# Patient Record
Sex: FEMALE | Race: BLACK OR AFRICAN AMERICAN
[De-identification: names, ages, dates, MRNs, and addresses within clinical notes are randomized per-mention and may not be internally consistent; named-entity substitution may affect disease eponyms.]

---

## 2018-07-11 ENCOUNTER — HOSPITAL ENCOUNTER (INPATIENT)
Dept: HOSPITAL 92 - ERS | Age: 73
LOS: 12 days | Discharge: NURSING, REHAB OR LONG-TERM CARE | DRG: 64 | End: 2018-07-23
Attending: INTERNAL MEDICINE | Admitting: INTERNAL MEDICINE
Payer: MEDICARE

## 2018-07-11 VITALS — BODY MASS INDEX: 28.5 KG/M2

## 2018-07-11 DIAGNOSIS — E11.22: ICD-10-CM

## 2018-07-11 DIAGNOSIS — R29.710: ICD-10-CM

## 2018-07-11 DIAGNOSIS — I12.9: ICD-10-CM

## 2018-07-11 DIAGNOSIS — I16.0: ICD-10-CM

## 2018-07-11 DIAGNOSIS — D63.1: ICD-10-CM

## 2018-07-11 DIAGNOSIS — Z79.4: ICD-10-CM

## 2018-07-11 DIAGNOSIS — R47.1: ICD-10-CM

## 2018-07-11 DIAGNOSIS — Z86.718: ICD-10-CM

## 2018-07-11 DIAGNOSIS — G93.49: ICD-10-CM

## 2018-07-11 DIAGNOSIS — R47.01: ICD-10-CM

## 2018-07-11 DIAGNOSIS — I63.511: Primary | ICD-10-CM

## 2018-07-11 DIAGNOSIS — N18.4: ICD-10-CM

## 2018-07-11 DIAGNOSIS — R13.12: ICD-10-CM

## 2018-07-11 DIAGNOSIS — R62.7: ICD-10-CM

## 2018-07-11 DIAGNOSIS — E87.2: ICD-10-CM

## 2018-07-11 DIAGNOSIS — N17.9: ICD-10-CM

## 2018-07-11 DIAGNOSIS — E87.0: ICD-10-CM

## 2018-07-11 DIAGNOSIS — K21.9: ICD-10-CM

## 2018-07-11 DIAGNOSIS — G81.94: ICD-10-CM

## 2018-07-11 LAB
ALBUMIN SERPL BCG-MCNC: 3.7 G/DL (ref 3.4–4.8)
ALP SERPL-CCNC: 183 U/L (ref 40–150)
ALT SERPL W P-5'-P-CCNC: 26 U/L (ref 8–55)
ANION GAP SERPL CALC-SCNC: 14 MMOL/L (ref 10–20)
APTT PPP: 29.2 SEC (ref 22.9–36.1)
AST SERPL-CCNC: 31 U/L (ref 5–34)
BASOPHILS # BLD AUTO: 0 THOU/UL (ref 0–0.2)
BASOPHILS NFR BLD AUTO: 0.1 % (ref 0–1)
BILIRUB SERPL-MCNC: 0.6 MG/DL (ref 0.2–1.2)
BUN SERPL-MCNC: 45 MG/DL (ref 9.8–20.1)
CALCIUM SERPL-MCNC: 9.6 MG/DL (ref 7.8–10.44)
CHLORIDE SERPL-SCNC: 113 MMOL/L (ref 98–107)
CK MB SERPL-MCNC: 1.2 NG/ML (ref 0–6.6)
CK SERPL-CCNC: 68 U/L (ref 29–168)
CO2 SERPL-SCNC: 19 MMOL/L (ref 23–31)
CREAT CL PREDICTED SERPL C-G-VRATE: 0 ML/MIN (ref 70–130)
EOSINOPHIL # BLD AUTO: 0 THOU/UL (ref 0–0.7)
EOSINOPHIL NFR BLD AUTO: 0.2 % (ref 0–10)
GLOBULIN SER CALC-MCNC: 4.2 G/DL (ref 2.4–3.5)
GLUCOSE SERPL-MCNC: 174 MG/DL (ref 83–110)
HGB BLD-MCNC: 10.9 G/DL (ref 12–16)
INR PPP: 1.1
LYMPHOCYTES # BLD: 1.3 THOU/UL (ref 1.2–3.4)
LYMPHOCYTES NFR BLD AUTO: 12.7 % (ref 21–51)
MCH RBC QN AUTO: 28.7 PG (ref 27–31)
MCV RBC AUTO: 82.8 FL (ref 78–98)
MONOCYTES # BLD AUTO: 0.6 THOU/UL (ref 0.11–0.59)
MONOCYTES NFR BLD AUTO: 6 % (ref 0–10)
NEUTROPHILS # BLD AUTO: 8.3 THOU/UL (ref 1.4–6.5)
NEUTROPHILS NFR BLD AUTO: 80.9 % (ref 42–75)
PLATELET # BLD AUTO: 136 THOU/UL (ref 130–400)
POTASSIUM SERPL-SCNC: 4.7 MMOL/L (ref 3.5–5.1)
PROTHROMBIN TIME: 14.5 SEC (ref 12–14.7)
RBC # BLD AUTO: 3.81 MILL/UL (ref 4.2–5.4)
SODIUM SERPL-SCNC: 141 MMOL/L (ref 136–145)
TROPONIN I SERPL DL<=0.01 NG/ML-MCNC: 0.08 NG/ML (ref ?–0.03)
WBC # BLD AUTO: 10.3 THOU/UL (ref 4.8–10.8)

## 2018-07-11 PROCEDURE — 96365 THER/PROPH/DIAG IV INF INIT: CPT

## 2018-07-11 PROCEDURE — 86900 BLOOD TYPING SEROLOGIC ABO: CPT

## 2018-07-11 PROCEDURE — 93005 ELECTROCARDIOGRAM TRACING: CPT

## 2018-07-11 PROCEDURE — 36416 COLLJ CAPILLARY BLOOD SPEC: CPT

## 2018-07-11 PROCEDURE — 80061 LIPID PANEL: CPT

## 2018-07-11 PROCEDURE — 85610 PROTHROMBIN TIME: CPT

## 2018-07-11 PROCEDURE — 96366 THER/PROPH/DIAG IV INF ADDON: CPT

## 2018-07-11 PROCEDURE — 71045 X-RAY EXAM CHEST 1 VIEW: CPT

## 2018-07-11 PROCEDURE — A4216 STERILE WATER/SALINE, 10 ML: HCPCS

## 2018-07-11 PROCEDURE — 82553 CREATINE MB FRACTION: CPT

## 2018-07-11 PROCEDURE — 96375 TX/PRO/DX INJ NEW DRUG ADDON: CPT

## 2018-07-11 PROCEDURE — 80053 COMPREHEN METABOLIC PANEL: CPT

## 2018-07-11 PROCEDURE — 93306 TTE W/DOPPLER COMPLETE: CPT

## 2018-07-11 PROCEDURE — 93880 EXTRACRANIAL BILAT STUDY: CPT

## 2018-07-11 PROCEDURE — 82805 BLOOD GASES W/O2 SATURATION: CPT

## 2018-07-11 PROCEDURE — 74018 RADEX ABDOMEN 1 VIEW: CPT

## 2018-07-11 PROCEDURE — 70450 CT HEAD/BRAIN W/O DYE: CPT

## 2018-07-11 PROCEDURE — 84484 ASSAY OF TROPONIN QUANT: CPT

## 2018-07-11 PROCEDURE — 86901 BLOOD TYPING SEROLOGIC RH(D): CPT

## 2018-07-11 PROCEDURE — 36415 COLL VENOUS BLD VENIPUNCTURE: CPT

## 2018-07-11 PROCEDURE — 36430 TRANSFUSION BLD/BLD COMPNT: CPT

## 2018-07-11 PROCEDURE — 85025 COMPLETE CBC W/AUTO DIFF WBC: CPT

## 2018-07-11 PROCEDURE — 86850 RBC ANTIBODY SCREEN: CPT

## 2018-07-11 PROCEDURE — 85730 THROMBOPLASTIN TIME PARTIAL: CPT

## 2018-07-11 PROCEDURE — 86921 COMPATIBILITY TEST INCUBATE: CPT

## 2018-07-11 PROCEDURE — P9016 RBC LEUKOCYTES REDUCED: HCPCS

## 2018-07-11 PROCEDURE — 80048 BASIC METABOLIC PNL TOTAL CA: CPT

## 2018-07-11 RX ADMIN — INSULIN HUMAN PRN UNIT: 100 INJECTION, SOLUTION PARENTERAL at 18:36

## 2018-07-11 NOTE — ULT
BILATERAL CAROTID DUPLEX ULTRASOUND INCLUDING COLOR AND SPECTRAL DOPPLER IMAGING:

 

DATE:   07/11/18 

 

HISTORY:  

73-year-old female with history of CVA, motor deficits, left arm weakness, left hand weakness, left l
eg weakness, and aphasia. 

 

FINDINGS:

 

Visual plaque noted in the proximal ICAs and distal CCAs. 

 

PSV Right ICA:  71 cm/sec

EDV:  7 cm/sec

ICA/CCA Ratio:  0.8

 

PSV Left ICA:  86 cm/sec

EDV:  13 cm/sec

ICA/CCA Ratio:  1.0

 

Minimal increased velocities in both right and left ECAs, worse on the right side. 

 

IMPRESSION: 

No hemodynamically significant stenosis. Bilateral plaque, evidence for atherosclerotic arteriovascul
ar carotid disease bilaterally. 

 

 

POS: KEL

## 2018-07-11 NOTE — HP
CHIEF COMPLAINT:  Altered mental status.

 

HISTORY OF PRESENT ILLNESS:  This patient is a 73-year-old female with history of hypertension, diabe
mel who presented to this facility via [facility] transfer.  The patient was apparently in her usual
 state of health yesterday; however, she went to bed early in the evening, according to her family ar
ound 6:00 p.m.  Today, the patient was found to be altered and the patient's son had asked another so
n to come and evaluate her.  When he was unable to get her on the phone, he called an ambulance and t
he patient was subsequently transported.  Here, the patient was noted to have slurred speech and prof
ound left-sided weakness consistent with CVA.  CT scan of the brain confirms a large right middle cer
ebral artery distribution infarct which is already relatively mature.  ER physician noted the patient
 will be on her window for TPA.  He discussed the case with Dr. Cedillo to discuss possible retrieval i
ntervention; however, given the majority of the infarct on the scan, she is not a candidate for that 
either.  The patient has had significant elevations in her blood pressure in the emergency department
 which was initially unresponsive to labetalol and she has subsequently been placed on a Cardene drip
.

 

PAST MEDICAL HISTORY:  Primarily notable from the record includes history of reflux and "blood clot i
n the leg and neck," history of diabetes, hypertension, and apparent history of CVA as well.

 

SOCIAL HISTORY:  No alcohol, drugs or tobacco.

 

FAMILY HISTORY:  Not known at this time.

 

REVIEW OF SYSTEMS:  Not obtainable.

 

CURRENT MEDICATIONS:  Ferrous sulfate 65 mg 1 p.o. b.i.d. and Coreg 25 mg 1 p.o. b.i.d.

 

ALLERGIES:  None known.

 

PHYSICAL EXAMINATION:

VITAL SIGNS:  Most recent recorded vitals, blood pressure 192/88, pulse was 80, respirations 16, O2 s
at 94% on room air.

GENERAL APPEARANCE:  Age appropriate female.  She is lying supine in the bed.  She is intermittently 
awake.  She does acknowledge her presence and occasionally speaks one syllable words.

HEENT:  Pupils are equal and reactive.  She has no OP lesions.

NECK:  Supple and symmetric.

HEART:  Regular rate and rhythm without murmurs, gallops or rubs.

LUNGS:  Clear to auscultation bilaterally.  No wheezes or rales.

ABDOMEN:  Soft, nontender, nondistended, positive bowel sounds.  No masses or organomegaly.

EXTREMITIES:  Warm and dry without edema.

SKIN:  Intact without lesions.

NEUROLOGIC:  Patient is borderline obtunded, but we will interact.  She has profound left hemiplegia 
at present.  She has some dysarthria, but is able to communicate somewhat.  She appears to have signi
ficant left-sided neglect and left-sided facial hypesthesia.

 

LABORATORY DATA AND IMAGING DATA:  White count 7.3, hemoglobin 10.9, platelets 136.  INR is 1.1, PTT 
29.2.  Sodium is 141, potassium 4.7, chloride 113, CO2 19, BUN 45, creatinine 3.59, glucose 174, calc
ium 9.6, AST 31, ALT 26, alkaline phosphatase 183.  CK 68, troponin 0.077, albumin 3.7.  CT of the he
ad as mentioned above with a large right middle cerebral artery infarct.  Chest x-ray is negative.

 

IMPRESSION AND PLAN:

1.  Large right hemispheric infarct in the right MCA distribution.  This infarct appears to be subacu
te at this point fairly mature based on the evidence revealed on the CT scan.  The patient has manife
stations of profound left-sided hemiplegia and left-sided neglect with apparently some left-sided fac
ial hypesthesia as well.  The patient has received an aspirin.  She will be admitted to the ICU.  We 
will consult the stroke team including Neurology, speech therapy, PT and OT.  We will obtain a caroti
d Doppler and an echocardiogram.  I discussed at length with the patient's family.  The patient is ce
rtainly at risk of some complications over the next 24-48 hours including extension of the infarct, w
orsening cerebral edema, seizures and others.

2.  Chronic kidney disease stage 4.  The patient is followed by Dr. Leahy.  She has not received a CTA 
partly because the infarct was beyond the point of being benefited from retrieval and because of the 
risks to the kidneys.  We will consult Dr. Leahy to continue to follow the patient while she is in the 
hospital.

3.  Hypertension.  The patient has post-infarct hypertension.  We will allow some permissive hyperten
jenna.  She is currently on Cardene with acceptable blood pressure control.

4.  Diabetes mellitus.  We will continue with Accu-Cheks and sliding scale as needed.

 

The patient has not determined any end of life issues or done any advanced planning.  She has 2 sons.
  They can speak on her behalf, one of whom is present.  I will consult Palliative Care team as well.

## 2018-07-11 NOTE — CT
BRAIN CT WITHOUT IV CONTRAST:

 

HISTORY:

A 73-year-old female with a history of left-sided weakness and a left-sided facial droop.  Last seen 
normal approximately 8 hours ago.

 

FINDINGS:

There is a large, well defined area of abnormal low attenuation in the right temporal lobe and extend
ing into the posterior frontal lobe and the anterior parietal lobe, evidence for a large, subacute ri
ght middle cerebral artery distribution infarct.  No evidence of acute hemorrhage.  No evidence of a 
midline shift.  Mild right-sided maxillary sinus mucosal disease.  The mastoids are clear.

 

IMPRESSION:

Large right middle cerebral artery distribution subacute infarct without evidence for acute hemorrhag
e.

 

Findings discussed with Dr. Rushing in the ER at 8:00 a.m.

 

CODE CR

 

POS: KEL

## 2018-07-11 NOTE — CON
DATE OF CONSULTATION:  07/11/2018

 

This consult encompassed 70 minutes time.  Of that time, greater than 50% was spent with the patient 
and/or in the patient's critical care unit.

 

HISTORY OF PRESENT ILLNESS:  The patient is a 73-year-old female who presented with altered mental st
atus and left-sided hemiplegia.  She apparently presented 12 hours after onset and is considered outs
gonzalez the window for TPA.  She is severely hypertensive and is currently on a Cardene drip.

 

The patient cannot give much in the way of history, but she can talk.

 

PAST MEDICAL HISTORY:

1.  Gastroesophageal reflux.

2.  Blood clot in the leg and neck.

3.  History of diabetes mellitus.

4.  Hypertension.

5.  Previous stroke.

 

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

 

SOCIAL HISTORY:  Does not consume alcohol, does not use illicit drugs.

 

FAMILY MEDICAL HISTORY:  Unknown.

 

MEDICATIONS PRIOR TO ADMISSION:  Iron sulfate, Coreg.

 

PAST SURGICAL HISTORY:  Unknown.

 

ALLERGIES:  None.

 

REVIEW OF SYSTEMS:  Cannot be obtained secondary to patient's altered mental status.

 

PHYSICAL EXAMINATION:

VITAL SIGNS:  Temperature 98, O2 sat 95%, blood pressure 185/75, pulse 89, respiration rate 17.

GENERAL:  The patient is awake.  Neurologically, she has a left-sided facial droop.  She has left arm
 and left leg hemiparesis.  She has no sensation on the left side and no movement in the left side.  
Right side has full movement and full sensation.

HEENT:  Otherwise, unremarkable.

NECK:  No JVD.

LUNGS:  Clear without wheezing or rhonchi.

CARDIAC:  S1, S2 regular without murmur.

ABDOMEN:  Soft, nontender, nondistended.

EXTREMITIES:  No clubbing, cyanosis, or edema.

 

IMAGING:  Chest x-ray reviewed personally by myself shows normal heart size, perhaps some mild fullne
ss in the right hilum, otherwise negative.  Brain CT demonstrated a large right MCA distribution infa
rct without evidence of acute hemorrhage.

 

LABORATORY DATA:  White blood cell count 10, hematocrit 31.6, platelet count 136.  INR 1.1.  Sodium 1
41, potassium 4.7, chloride 113, CO2 19, BUN 45, creatinine 3.6, glucose 174, alkaline phosphatase 18
3.  Troponin 0.07.

 

ASSESSMENT:

1.  Cerebrovascular accident - right MCA distribution with resultant left-sided hemiparesis and left-
sided facial paralysis.

2.  Chronic kidney disease.

3.  Hypertension.

4.  Diabetes mellitus.

5.  Hypertension, out of control.

 

PLAN:  The patient is currently on a Cardene drip for blood pressure control.  Tomorrow she could pro
bably be converted over to oral medications.  Neurology has been consulted.  She is on sliding scale 
insulin for her diabetes mellitus.

## 2018-07-11 NOTE — RAD
CHEST 1 VIEW:

 

Date:  07/11/18 

 

HISTORY:  

73-year-old female with history of altered mental status, aphasia, left-sided deficits, stroke alert.
 

 

FINDINGS:

Minimal cardiomegaly with some mild bilateral vascular congestion. No confluent pneumonia, overt carlos
a, or pleural effusion. 

 

IMPRESSION: 

Cardiomegaly with mild vascular congestion. 

 

 

POS: MELISSAH

## 2018-07-12 LAB
ANION GAP SERPL CALC-SCNC: 18 MMOL/L (ref 10–20)
BASOPHILS # BLD AUTO: 0.1 THOU/UL (ref 0–0.2)
BASOPHILS NFR BLD AUTO: 0.4 % (ref 0–1)
BUN SERPL-MCNC: 54 MG/DL (ref 9.8–20.1)
CALCIUM SERPL-MCNC: 9.4 MG/DL (ref 7.8–10.44)
CHD RISK SERPL-RTO: 5.1 (ref ?–4.5)
CHLORIDE SERPL-SCNC: 115 MMOL/L (ref 98–107)
CHOLEST SERPL-MCNC: 128 MG/DL
CO2 SERPL-SCNC: 14 MMOL/L (ref 23–31)
CREAT CL PREDICTED SERPL C-G-VRATE: 17 ML/MIN (ref 70–130)
EOSINOPHIL # BLD AUTO: 0 THOU/UL (ref 0–0.7)
EOSINOPHIL NFR BLD AUTO: 0 % (ref 0–10)
GLUCOSE SERPL-MCNC: 177 MG/DL (ref 83–110)
HDLC SERPL-MCNC: 25 MG/DL
HGB BLD-MCNC: 10.5 G/DL (ref 12–16)
LDLC SERPL CALC-MCNC: 71 MG/DL
LYMPHOCYTES # BLD: 1.2 THOU/UL (ref 1.2–3.4)
LYMPHOCYTES NFR BLD AUTO: 9.2 % (ref 21–51)
MCH RBC QN AUTO: 28.5 PG (ref 27–31)
MCV RBC AUTO: 81.8 FL (ref 78–98)
MONOCYTES # BLD AUTO: 0.6 THOU/UL (ref 0.11–0.59)
MONOCYTES NFR BLD AUTO: 4.3 % (ref 0–10)
NEUTROPHILS # BLD AUTO: 11.4 THOU/UL (ref 1.4–6.5)
NEUTROPHILS NFR BLD AUTO: 86.2 % (ref 42–75)
PLATELET # BLD AUTO: 154 THOU/UL (ref 130–400)
POTASSIUM SERPL-SCNC: 5 MMOL/L (ref 3.5–5.1)
RBC # BLD AUTO: 3.7 MILL/UL (ref 4.2–5.4)
SODIUM SERPL-SCNC: 142 MMOL/L (ref 136–145)
TRIGL SERPL-MCNC: 160 MG/DL (ref ?–150)
WBC # BLD AUTO: 13.2 THOU/UL (ref 4.8–10.8)

## 2018-07-12 RX ADMIN — INSULIN HUMAN PRN UNIT: 100 INJECTION, SOLUTION PARENTERAL at 06:49

## 2018-07-12 RX ADMIN — NICARDIPINE HYDROCHLORIDE SCH MLS: 25 INJECTION INTRAVENOUS at 13:15

## 2018-07-12 RX ADMIN — NICARDIPINE HYDROCHLORIDE SCH MLS: 25 INJECTION INTRAVENOUS at 20:01

## 2018-07-12 RX ADMIN — Medication SCH ML: at 20:04

## 2018-07-12 RX ADMIN — INSULIN HUMAN PRN UNIT: 100 INJECTION, SOLUTION PARENTERAL at 17:59

## 2018-07-12 RX ADMIN — NICARDIPINE HYDROCHLORIDE SCH MLS: 25 INJECTION INTRAVENOUS at 09:23

## 2018-07-12 RX ADMIN — HEPARIN SODIUM SCH UNITS: 5000 INJECTION, SOLUTION INTRAVENOUS; SUBCUTANEOUS at 20:04

## 2018-07-12 RX ADMIN — INSULIN HUMAN PRN UNIT: 100 INJECTION, SOLUTION PARENTERAL at 11:50

## 2018-07-12 RX ADMIN — NICARDIPINE HYDROCHLORIDE SCH MLS: 25 INJECTION INTRAVENOUS at 03:00

## 2018-07-12 RX ADMIN — Medication SCH: at 12:21

## 2018-07-12 NOTE — PRG
DATE OF SERVICE:  07/12/2018

 

The patient is doing about the same.  She is dysarthric.  She has hemiparesis on the left side, but s
he will follow commands with the right.

 

PHYSICAL EXAMINATION:

VITAL SIGNS:  Temperature is 99.2, pulse 97, blood pressure 169/60, O2 sat 93%.

HEENT:  Unremarkable except for facial droop.

NECK:  No JVD.

LUNGS:  Coarse breath sounds.

CARDIAC:  S1 and S2 regular.

ABDOMEN:  Soft, nontender.

EXTREMITIES:  No edema.

 

LABORATORY DATA:  White blood cell count 13.2, hematocrit 30, platelet count 154.  INR 1.1.  Sodium 1
42, potassium 5, chloride 115, CO2 14, BUN 54, creatinine 3.7, glucose 177.

 

ASSESSMENT:

1.  Status post cerebrovascular accident with left-sided hemiparesis.  She has a right middle cerebra
l artery distribution infarct.

2.  Chronic kidney disease.

3.  Non-anion gap metabolic acidosis.

4.  Hypertension.

5.  Poor swallowing. 

6.  Diabetes mellitus.

 

PLAN:  She needs to be seen by Speech Therapy to evaluate her swallowing.  If she cannot swallow, the
n we need to put in some type of feeding tube, whether it be NG tube or PEG tube so that we can give 
her blood pressure medication.  In the meantime, she is continuing with Cardene drip which necessitat
es keeping her in the CCU.

## 2018-07-12 NOTE — PDOC.PN
- Subjective


Encounter Start Date: 07/12/18


Encounter Start Time: 08:50





Denies any pain or any specific needs.  She expresses understanding of her 

medical situation.





- Objective


Resuscitation Status: 


 











Resuscitation Status           FULL:Full Resuscitation














Vital Signs & Weight: 


 Vital Signs (12 hours)











  Temp Pulse Pulse Pulse Resp BP BP


 


 07/12/18 12:00  99.7 F H      


 


 07/12/18 09:05    95  96   171/65 H  141/115 H


 


 07/12/18 08:00  99.2 F  95    23 H  


 


 07/12/18 07:00  99.2 F      


 


 07/12/18 04:00  98.4 F      














  Pulse Ox Pulse Ox Pulse Ox


 


 07/12/18 12:00   


 


 07/12/18 09:05   95  94 L


 


 07/12/18 08:00  95  


 


 07/12/18 07:00   


 


 07/12/18 04:00   








 Weight











Admit Weight                   170 lb 6.677 oz


 


Weight                         170 lb 6.677 oz











 Most Recent Monitor Data











Heart Rate from ECG            98


 


NIBP                           202/66


 


NIBP BP-Mean                   85


 


Respiration from ECG           15


 


SpO2                           92














I&O: 


 











 07/11/18 07/12/18 07/13/18





 06:59 06:59 06:59


 


Intake Total  2337 30


 


Output Total   600


 


Balance  2337 -570











Result Diagrams: 


 07/12/18 05:30





 07/12/18 05:30


Additional Labs: 


 Accuchecks











  07/12/18 07/12/18 07/11/18





  11:26 05:35 23:25


 


POC Glucose  214 H  175 H  136 H














  07/11/18





  17:35


 


POC Glucose  209 H














Phys Exam





- Physical Examination


Constitutional: NAD


Still sleeps much of the time. 


Neck: no JVD, supple


Respiratory: no wheezing, no rales, no rhonchi, clear to auscultation bilateral


Cardiovascular: RRR, no significant murmur, no rub


Gastrointestinal: soft, non-tender, no distention, positive bowel sounds


Musculoskeletal: no edema


Dense left hemiplegia.  Speech is improved.





Dx/Plan


(1) Acute CVA (cerebrovascular accident)


Code(s): I63.9 - CEREBRAL INFARCTION, UNSPECIFIED   Status: Acute   Comment: 

Dense left hemiplegia.  BP has continued to be a challenge.  Swallowing changes 

intermittently. Had ASA yesterday.  Will change to oral today.  Continue with 

the stroke team and assessing swallow.   





(2) Hypertensive urgency


Code(s): I16.0 - HYPERTENSIVE URGENCY   Status: Acute   Comment: On Cardene 

gtt.  Continue to titrate as needed.   





(3) Diabetes


Code(s): E11.9 - TYPE 2 DIABETES MELLITUS WITHOUT COMPLICATIONS   Status: 

Chronic   Comment: Will continue with SSI.  No able to eat much at this time.  

   





(4) Hypertension


Code(s): I10 - ESSENTIAL (PRIMARY) HYPERTENSION   Status: Chronic   Comment: 

Resume home meds.   





(5) Hyperlipidemia


Code(s): E78.5 - HYPERLIPIDEMIA, UNSPECIFIED   Status: Chronic   Comment: 

Resume home statin   





(6) Hemiplegia affecting left nondominant side


Code(s): G81.94 - HEMIPLEGIA, UNSPECIFIED AFFECTING LEFT NONDOMINANT SIDE   

Status: Acute   Comment: PT/OT   





(7) Dysphagia


Code(s): R13.10 - DYSPHAGIA, UNSPECIFIED   Status: Acute   Comment: ST 

continually assessing.  Believes she can take pills now, but still not ready 

for food.   





(8) CKD (chronic kidney disease) stage 4, GFR 15-29 ml/min


Code(s): N18.4 - CHRONIC KIDNEY DISEASE, STAGE 4 (SEVERE)   Status: Acute   

Comment: Renal dose meds.   





- Plan





* .

## 2018-07-12 NOTE — CON
DATE OF CONSULTATION:  07/11/2018

 

REFERRING PHYSICIAN:  Dr. Modesto Ruiz.

 

REASON FOR CONSULTATION:  Left hemiparesis.

 

HISTORY OF PRESENT ILLNESS:  Ms. Purdy is a pleasant 73-year-old -American female, who has 
been considered for evaluation of left hemiparesis.  History is obtained from the patient's dictated 
H&P note as patient unable to provide and there were no family member present at bedside.  Apparently
, patient was in her usual state of health yesterday; however, she went to bed early in the evening a
round 6:00 p.m. when one of the son had called her and she did not answer the phone, which concerned 
him and thus, he had called an ambulance.  Patient was subsequently transferred to [facility].  She was found to have slurred speech and left hemiplegia.  CT scan was obtained in the ER, which 
had shown large right MCA distribution.  Hypodensity, for this reason, she was not a candidate for an
y endovascular therapy.  She is now being admitted to ICU for close observation.  Patient currently d
enies any headache, chest pain, palpitation.

 

PAST MEDICAL HISTORY:  Significant for diabetes, hypertension, history of stroke, and 

history of blood clot.

 

FAMILY HISTORY:  Not contributory.

 

SOCIAL HISTORY:  She does not smoke cigarettes, drink alcohol or use illicit drugs.

 

CURRENT MEDICATIONS:  Unknown.

 

ALLERGIES:  Include LISINOPRIL.

 

REVIEW OF SYSTEMS:  As mentioned, which was negative.

 

PHYSICAL EXAMINATION:

VITAL SIGNS:  Blood pressure of 181/94, pulse of 95, respirations of 18, O2 sats of 93%, and temperat
ure of 98.7.

GENERAL:  Well-developed, well-nourished -American female, in no apparent distress.

RESPIRATORY:  Clear to auscultation bilaterally.

CARDIOVASCULAR:  Regular rate and rhythm.

NEUROLOGIC:  Mental status:  The patient is awake, alert, oriented x3.  Speech and language mildly dy
sarthric speech.  Cranial nerves:  Pupils are 3 mm and reactive.  Visual fields are intact.  External
 muscles are intact.  She does have a right gaze deviation.  There is a left facial droop noted.  Mot
or exam showed normal tone and bulk on the right upper and right lower extremity.  She has a flaccid 
left upper and left lower extremity.  Her strength in the left upper and left lower extremity is 0/5.
  Strength in the right upper and right lower extremity is 5/5.  Sensory:  She has a hemineglect on t
he left side.  She has loss of sensation on the left side.  Deep tendon reflexes, brisk reflexes in t
he left upper and left lower extremity.  Babinski plantar responses extensor on the left.  Gait and R
omberg coordination could not be tested.

 

LABORATORY DATA:  I reviewed, which included CBC, coag panel, CMP, which is significant for hemoglobi
n 10.9, hematocrit 31.6, BUN of 45, creatinine of 3.59, glucose of 174, alkaline phosphatase of 183.

 

IMAGING STUDIES:  CT head without contrast was reviewed, which showed large hypodensity involving the
 right MCA distribution suggestive of acute ischemic event.

 

IMPRESSION:

1.  Large right middle cerebral artery distribution ischemic infarct.

2.  Left hemiparesis noted due to #1.

3.  Dysarthric noted due to #1.

 

ASSESSMENT AND PLAN:  Ms. Purdy is a pleasant 73-year-old -American female, who presented w
ith an acute onset of left hemiparesis and dysarthria.  She had a CT scan of the head done, which sg
wed large right MCA distribution ischemic infarct.  At this time, I would recommend making patient n.
p.o. until further evaluated by Speech Therapy.  I will recommend consulting PT, OT, speech therapy. 
 I will recommend starting her on aspirin 300 mg suppository until she is able to take orally, at [facility] time it can be switched to aspirin 325 mg daily for secondary stroke prevention.  I will recommend
 obtaining echocardiogram and placing her on telemetry monitoring to observe any paroxysmal atrial fi
brillation.  She does have a very large area of the stroke, which concerns me that she may develop sw
elling.  If there is a deterioration in neurological function, then we need to obtain stat CT head an
d consult Neurosurgery for their assistance.  I discussed the prognosis and the CT scan results findi
[facility] with the son Mirza Benjamin over the phone and explained that she does have a large area of the 
stroke and currently prognosis is guarded.  I have answered all his questions and concerns during the
 conversation.  Continue supportive care.  Continue current medical management.

## 2018-07-13 LAB
ANION GAP SERPL CALC-SCNC: 16 MMOL/L (ref 10–20)
BASOPHILS # BLD AUTO: 0 THOU/UL (ref 0–0.2)
BASOPHILS NFR BLD AUTO: 0.2 % (ref 0–1)
BUN SERPL-MCNC: 65 MG/DL (ref 9.8–20.1)
CALCIUM SERPL-MCNC: 9.2 MG/DL (ref 7.8–10.44)
CHLORIDE SERPL-SCNC: 118 MMOL/L (ref 98–107)
CO2 SERPL-SCNC: 15 MMOL/L (ref 23–31)
CREAT CL PREDICTED SERPL C-G-VRATE: 17 ML/MIN (ref 70–130)
EOSINOPHIL # BLD AUTO: 0 THOU/UL (ref 0–0.7)
EOSINOPHIL NFR BLD AUTO: 0 % (ref 0–10)
GLUCOSE SERPL-MCNC: 248 MG/DL (ref 83–110)
HGB BLD-MCNC: 9.7 G/DL (ref 12–16)
LYMPHOCYTES # BLD: 1.5 THOU/UL (ref 1.2–3.4)
LYMPHOCYTES NFR BLD AUTO: 12.1 % (ref 21–51)
MCH RBC QN AUTO: 28.9 PG (ref 27–31)
MCV RBC AUTO: 82 FL (ref 78–98)
MONOCYTES # BLD AUTO: 0.9 THOU/UL (ref 0.11–0.59)
MONOCYTES NFR BLD AUTO: 6.7 % (ref 0–10)
NEUTROPHILS # BLD AUTO: 10.2 THOU/UL (ref 1.4–6.5)
NEUTROPHILS NFR BLD AUTO: 80.9 % (ref 42–75)
PLATELET # BLD AUTO: 151 THOU/UL (ref 130–400)
POTASSIUM SERPL-SCNC: 4.5 MMOL/L (ref 3.5–5.1)
RBC # BLD AUTO: 3.36 MILL/UL (ref 4.2–5.4)
SODIUM SERPL-SCNC: 144 MMOL/L (ref 136–145)
WBC # BLD AUTO: 12.7 THOU/UL (ref 4.8–10.8)

## 2018-07-13 RX ADMIN — Medication SCH ML: at 09:46

## 2018-07-13 RX ADMIN — NICARDIPINE HYDROCHLORIDE SCH MLS: 25 INJECTION INTRAVENOUS at 20:02

## 2018-07-13 RX ADMIN — NICARDIPINE HYDROCHLORIDE SCH MLS: 25 INJECTION INTRAVENOUS at 23:43

## 2018-07-13 RX ADMIN — INSULIN HUMAN PRN UNIT: 100 INJECTION, SOLUTION PARENTERAL at 06:00

## 2018-07-13 RX ADMIN — INSULIN HUMAN PRN UNIT: 100 INJECTION, SOLUTION PARENTERAL at 00:33

## 2018-07-13 RX ADMIN — POLYVINYL ALCOHOL, POVIDONE PRN DROP: 14; 6 SOLUTION/ DROPS OPHTHALMIC at 18:16

## 2018-07-13 RX ADMIN — HEPARIN SODIUM SCH UNITS: 5000 INJECTION, SOLUTION INTRAVENOUS; SUBCUTANEOUS at 20:04

## 2018-07-13 RX ADMIN — HEPARIN SODIUM SCH UNITS: 5000 INJECTION, SOLUTION INTRAVENOUS; SUBCUTANEOUS at 09:50

## 2018-07-13 RX ADMIN — INSULIN HUMAN PRN UNIT: 100 INJECTION, SOLUTION PARENTERAL at 18:43

## 2018-07-13 RX ADMIN — NICARDIPINE HYDROCHLORIDE SCH MLS: 25 INJECTION INTRAVENOUS at 12:05

## 2018-07-13 RX ADMIN — INSULIN HUMAN PRN UNIT: 100 INJECTION, SOLUTION PARENTERAL at 12:11

## 2018-07-13 RX ADMIN — NICARDIPINE HYDROCHLORIDE SCH MLS: 25 INJECTION INTRAVENOUS at 15:39

## 2018-07-13 RX ADMIN — Medication SCH ML: at 20:02

## 2018-07-13 RX ADMIN — NICARDIPINE HYDROCHLORIDE SCH MLS: 25 INJECTION INTRAVENOUS at 04:14

## 2018-07-13 RX ADMIN — NICARDIPINE HYDROCHLORIDE SCH MLS: 25 INJECTION INTRAVENOUS at 00:27

## 2018-07-13 NOTE — PDOC.PN
- Subjective


Encounter Start Date: 07/13/18


Encounter Start Time: 12:30





Patient able to speak, but limited.  Nursing notes she has been a little 

somnolent, but awakens easily and will speak if asked.





- Objective


Resuscitation Status: 


 











Resuscitation Status           FULL:Full Resuscitation














Vital Signs & Weight: 


 Vital Signs (12 hours)











  Temp Pulse Pulse BP BP Pulse Ox Pulse Ox


 


 07/13/18 12:00  98.5 F      


 


 07/13/18 10:44   98  98  173/61 H  181/56 H  95  96


 


 07/13/18 08:00  98.1 F      








 Weight











Admit Weight                   170 lb 6.677 oz


 


Weight                         170 lb 6.677 oz











 Most Recent Monitor Data











Heart Rate from ECG            96


 


NIBP                           168/54


 


NIBP BP-Mean                   102


 


Respiration from ECG           16


 


SpO2                           96














I&O: 


 











 07/12/18 07/13/18 07/14/18





 06:59 06:59 06:59


 


Intake Total 2337 1504 


 


Output Total  1100 


 


Balance 2337 404 











Result Diagrams: 


 07/13/18 05:30





 07/13/18 05:30


Additional Labs: 


 Accuchecks











  07/13/18 07/13/18 07/12/18





  12:06 00:34 17:57


 


POC Glucose  213 H  247 H  222 H














Phys Exam





- Physical Examination


Lying supine, eyes open.  


HEENT: oral pharynx no lesions


Neck: no JVD, supple


Respiratory: no wheezing


Upper airway rales from secretions. 


Cardiovascular: RRR, no significant murmur


Gastrointestinal: soft, no distention, positive bowel sounds


Some puffy edema of the hands, left > right.


Dense left hemiplegia, subdued mental status, Left neglect.





Dx/Plan


(1) Acute CVA (cerebrovascular accident)


Code(s): I63.9 - CEREBRAL INFARCTION, UNSPECIFIED   Status: Acute   Comment: 

Dense left hemiplegia.  BP has continued to be a challenge but better.  

Swallowing changes intermittently. Had ASA yesterday.  Will change to rectal 

today.  Continue with the stroke team and assessing swallow.   





(2) Hypertensive urgency


Code(s): I16.0 - HYPERTENSIVE URGENCY   Status: Acute   Comment: On Cardene 

gtt.  Continue to titrate as needed.  PRN's in addition to Cadene.  Expect it 

will improve within a day or two.    





(3) Diabetes


Code(s): E11.9 - TYPE 2 DIABETES MELLITUS WITHOUT COMPLICATIONS   Status: 

Chronic   Comment: Will continue with SSI.  No able to eat much at this time.  

   





(4) Hypertension


Code(s): I10 - ESSENTIAL (PRIMARY) HYPERTENSION   Status: Chronic   Comment: 

Resume home meds. When and if swallowing allows.   





(5) Hyperlipidemia


Code(s): E78.5 - HYPERLIPIDEMIA, UNSPECIFIED   Status: Chronic   Comment: 

Resume home statin when possible.   





(6) Hemiplegia affecting left nondominant side


Code(s): G81.94 - HEMIPLEGIA, UNSPECIFIED AFFECTING LEFT NONDOMINANT SIDE   

Status: Acute   Comment: PT/OT   





(7) Dysphagia


Code(s): R13.10 - DYSPHAGIA, UNSPECIFIED   Status: Acute   Comment: ST 

continually assessing.  Believes she can take pills now, but still not ready 

for food.   





(8) CKD (chronic kidney disease) stage 4, GFR 15-29 ml/min


Code(s): N18.4 - CHRONIC KIDNEY DISEASE, STAGE 4 (SEVERE)   Status: Acute   

Comment: Renal dose meds.  Made Dr. Leahy aware of her situation.    





- Plan





* Very long discussion with the patient's children and sister.  Explained 

everything that has happened and what we are doing for her.  Answered their 

questions.  They believe she wants everything possible done and she wants to 

fight to get better.  Agreed that if she is not able to take po's in next 24 

hours, we will consider DHT for feeding.  They understand that there is risk of 

aspiration even with that.  I also explained that there are a number of 

potential complications that still exist and that her recovery will be difficult

, long and not guaranteed.


* Time spent with family counseling 30 min.

## 2018-07-13 NOTE — PRG
DATE OF SERVICE:  07/13/2018

 

SUBJECTIVE:  The patient remains in the CCU on a Cardene drip.  She will say one word phrases when st
imulated, but will not follow any commands for me specifically.

 

PHYSICAL EXAMINATION:

VITAL SIGNS:  Temperature 98.8, pulse 101, blood pressure 150/76.  Currently on a Cardene drip at 15.


HEENT:  Unremarkable.

NECK:  No JVD.

LUNGS:  Clear.

CARDIAC:  S1 and S2 regular.

ABDOMEN:  Soft.

EXTREMITIES:  No edema.

 

LABORATORY DATA:  Sodium 144, potassium 4.5, chloride 118, CO2 15, BUN 65, creatinine 3.6.  White blo
od cell count 12.7, hematocrit 27.5, platelet count 151.

 

ASSESSMENT:

1.  Cerebrovascular accident with continued decline in mental status.

2.  Chronic renal failure.

3.  Large middle cerebral artery distribution infarct.

 

PLAN:  Her prognosis is extremely poor.  I doubt that she can swallow any antihypertensive medication
.  Palliative care is meeting with the family today.  I would encourage DNR and conversion to comfort
 measures.

## 2018-07-14 LAB
ANALYZER IN CARDIO: (no result)
ANION GAP SERPL CALC-SCNC: 15 MMOL/L (ref 10–20)
BASE EXCESS STD BLDA CALC-SCNC: -10.5 MEQ/L
BUN SERPL-MCNC: 72 MG/DL (ref 9.8–20.1)
CA-I BLDA-SCNC: 1.1 MMOL/L (ref 1.12–1.3)
CALCIUM SERPL-MCNC: 9 MG/DL (ref 7.8–10.44)
CHLORIDE SERPL-SCNC: 122 MMOL/L (ref 98–107)
CO2 SERPL-SCNC: 14 MMOL/L (ref 23–31)
CREAT CL PREDICTED SERPL C-G-VRATE: 18 ML/MIN (ref 70–130)
GLUCOSE SERPL-MCNC: 188 MG/DL (ref 83–110)
HCO3 BLDA-SCNC: 13.3 MEQ/L (ref 22–28)
HCT VFR BLDA CALC: 21 % (ref 36–47)
HGB BLDA-MCNC: 7.1 G/DL (ref 12–16)
PCO2 BLDA: 22.2 MMHG (ref 35–45)
PH BLDA: 7.39 [PH] (ref 7.35–7.45)
PO2 BLDA: 90.1 MMHG (ref 70–?)
POTASSIUM SERPL-SCNC: 4.4 MMOL/L (ref 3.5–5.1)
SODIUM SERPL-SCNC: 147 MMOL/L (ref 136–145)
SPECIMEN DRAWN FROM PATIENT: (no result)

## 2018-07-14 RX ADMIN — Medication SCH ML: at 10:39

## 2018-07-14 RX ADMIN — INSULIN HUMAN PRN UNIT: 100 INJECTION, SOLUTION PARENTERAL at 18:43

## 2018-07-14 RX ADMIN — INSULIN HUMAN PRN UNIT: 100 INJECTION, SOLUTION PARENTERAL at 05:41

## 2018-07-14 RX ADMIN — INSULIN HUMAN PRN UNIT: 100 INJECTION, SOLUTION PARENTERAL at 13:58

## 2018-07-14 RX ADMIN — NICARDIPINE HYDROCHLORIDE SCH MLS: 25 INJECTION INTRAVENOUS at 03:03

## 2018-07-14 RX ADMIN — NICARDIPINE HYDROCHLORIDE SCH MLS: 25 INJECTION INTRAVENOUS at 05:41

## 2018-07-14 RX ADMIN — Medication SCH ML: at 20:47

## 2018-07-14 RX ADMIN — NICARDIPINE HYDROCHLORIDE SCH MLS: 25 INJECTION INTRAVENOUS at 10:37

## 2018-07-14 RX ADMIN — HEPARIN SODIUM SCH UNITS: 5000 INJECTION, SOLUTION INTRAVENOUS; SUBCUTANEOUS at 16:40

## 2018-07-14 RX ADMIN — HEPARIN SODIUM SCH UNITS: 5000 INJECTION, SOLUTION INTRAVENOUS; SUBCUTANEOUS at 20:45

## 2018-07-14 NOTE — PDOC.PN
- Subjective


Encounter Start Date: 07/14/18


Encounter Start Time: 12:15





Unchanged.  No new issues per nursing.  





- Objective


Resuscitation Status: 


 











Resuscitation Status           FULL:Full Resuscitation














Vital Signs & Weight: 


 Vital Signs (12 hours)











  Temp Pulse Pulse Pulse Resp BP BP


 


 07/14/18 12:00  98.9 F      


 


 07/14/18 08:47    94  135 H   158/60 H  182/63 H


 


 07/14/18 08:00  98.9 F      


 


 07/14/18 07:59  98.2 F  100    20  


 


 07/14/18 03:00  98.2 F      














  Pulse Ox


 


 07/14/18 12:00  100


 


 07/14/18 08:47 


 


 07/14/18 08:00 


 


 07/14/18 07:59  100


 


 07/14/18 03:00 








 Weight











Admit Weight                   170 lb 6.677 oz


 


Weight                         170 lb 6.677 oz











 Most Recent Monitor Data











Heart Rate from ECG            99


 


NIBP                           173/56


 


NIBP BP-Mean                   102


 


Respiration from ECG           17


 


SpO2                           100














I&O: 


 











 07/13/18 07/14/18 07/15/18





 06:59 06:59 06:59


 


Intake Total 1504 2908 


 


Output Total 1100  


 


Balance 404 2908 











Result Diagrams: 


 07/13/18 05:30





 07/14/18 12:54


Additional Labs: 


 Accuchecks











  07/14/18 07/14/18 07/13/18





  13:53 05:41 23:44


 


POC Glucose  184 H  199 H  177 H














  07/13/18 07/13/18





  18:43 12:06


 


POC Glucose  200 H  213 H














Phys Exam





- Physical Examination


Constitutional: NAD


Slightly tachypneic.  Occasional cough with upper airway noise - secetions


Respiratory: no wheezing


Upper airway noise


Cardiovascular: RRR, no significant murmur


Gastrointestinal: soft, non-tender, no distention


Generalized puffy edema


Dense left hemiplegia.  Still has movement on the right.  May be slightly


weaker on the right.





Dx/Plan


(1) Acute CVA (cerebrovascular accident)


Code(s): I63.9 - CEREBRAL INFARCTION, UNSPECIFIED   Status: Acute   Comment: 

Dense left hemiplegia.  BP has continued to be a challenge but better.  

Swallowing changes intermittently. She has not had any neurologic improvement 

in the first 48 hours.  DHT placement for nutrition.  Continue antiplatelet 

therapy.   





(2) Hypertensive urgency


Code(s): I16.0 - HYPERTENSIVE URGENCY   Status: Acute   Comment: On Cardene 

gtt.  Continue to titrate as needed.  PRN's in addition to Cadene.  Improving. 

   





(3) Diabetes


Code(s): E11.9 - TYPE 2 DIABETES MELLITUS WITHOUT COMPLICATIONS   Status: 

Chronic   Comment: Will continue with SSI.  No able to eat much at this time.  

   





(4) Hypertension


Code(s): I10 - ESSENTIAL (PRIMARY) HYPERTENSION   Status: Chronic   Comment: 

Resume home meds. When and if swallowing allows or via DHT.   





(5) Hyperlipidemia


Code(s): E78.5 - HYPERLIPIDEMIA, UNSPECIFIED   Status: Chronic   Comment: 

Resume home statin when possible.   





(6) Hemiplegia affecting left nondominant side


Code(s): G81.94 - HEMIPLEGIA, UNSPECIFIED AFFECTING LEFT NONDOMINANT SIDE   

Status: Acute   Comment: PT/OT   





(7) Dysphagia


Code(s): R13.10 - DYSPHAGIA, UNSPECIFIED   Status: Acute   Comment: ST 

continually assessing.  Believes she can take pills now, but still not ready 

for food.   





(8) CKD (chronic kidney disease) stage 4, GFR 15-29 ml/min


Code(s): N18.4 - CHRONIC KIDNEY DISEASE, STAGE 4 (SEVERE)   Status: Acute   

Comment: Renal dose meds.  Made Dr. Leahy aware of her situation.    





- Plan





* Prognosis is still very poor.  Continue supportive measures.


* Still acidotic.  Check ABG for possible hypoventilation with hypercapnea.

## 2018-07-14 NOTE — RAD
KUB

7/14/18

 

PROVIDED CLINICAL HISTORY:  

Feeding tube placement.

 

FINDINGS/IMPRESSION:  

Enteric catheter is noted, the tip of which projects over the right mid abdomen. The abdominal bowel 
gas pattern is nonspecific. 

 

POS: LUCINA

## 2018-07-14 NOTE — PRG
DATE OF SERVICE:  07/14/2018

 

SUBJECTIVE:  The patient remains in the CCU.  She is almost entirely aphasic, but I can get her to sa
y yes occasionally.

 

OBJECTIVE:

VITAL SIGNS:  On exam, temperature is 98.9, pulse 99, blood pressure 182/63.  A 24-hour intake 908, o
utput 1100.

HEENT:  Unremarkable.

NECK:  No JVD.

LUNGS:  Clear.

CARDIAC:  S1 and S2, regular.

ABDOMEN:  Soft.

EXTREMITIES:  Trace edema.

 

LABORATORY DATA:  Sodium 144, potassium 4.5, chloride 118, CO2 of 18, BUN 65, creatinine 3.6, glucose
 248.  White blood cell count 12.7, hematocrit 27.5, and platelet count 151.

 

ASSESSMENT:

1.  Cerebrovascular accident.

2.  Aphasia.

3.  Poor oral intake.

 

PLAN:  She needs a Dobbhoff tube and start tube feeds.  Apparently, the family is inclined to continu
e with FULL CODE status, although I think the patient's prognosis is rather dismal.  She does have a 
fairly significant non-anion gap metabolic acidosis, which may have to be addressed.  I think we need
 to stop her IV fluids once tube feeds get started.

## 2018-07-15 LAB
ANION GAP SERPL CALC-SCNC: 14 MMOL/L (ref 10–20)
BASOPHILS # BLD AUTO: 0 THOU/UL (ref 0–0.2)
BASOPHILS NFR BLD AUTO: 0.2 % (ref 0–1)
BUN SERPL-MCNC: 82 MG/DL (ref 9.8–20.1)
CALCIUM SERPL-MCNC: 8.6 MG/DL (ref 7.8–10.44)
CHLORIDE SERPL-SCNC: 123 MMOL/L (ref 98–107)
CO2 SERPL-SCNC: 13 MMOL/L (ref 23–31)
CREAT CL PREDICTED SERPL C-G-VRATE: 17 ML/MIN (ref 70–130)
EOSINOPHIL # BLD AUTO: 0 THOU/UL (ref 0–0.7)
EOSINOPHIL NFR BLD AUTO: 0.1 % (ref 0–10)
GLUCOSE SERPL-MCNC: 303 MG/DL (ref 83–110)
HGB BLD-MCNC: 7.9 G/DL (ref 12–16)
LYMPHOCYTES # BLD: 1.1 THOU/UL (ref 1.2–3.4)
LYMPHOCYTES NFR BLD AUTO: 11.9 % (ref 21–51)
MCH RBC QN AUTO: 29.2 PG (ref 27–31)
MCV RBC AUTO: 82.3 FL (ref 78–98)
MONOCYTES # BLD AUTO: 0.6 THOU/UL (ref 0.11–0.59)
MONOCYTES NFR BLD AUTO: 7.1 % (ref 0–10)
NEUTROPHILS # BLD AUTO: 7.2 THOU/UL (ref 1.4–6.5)
NEUTROPHILS NFR BLD AUTO: 80.7 % (ref 42–75)
PLATELET # BLD AUTO: 112 THOU/UL (ref 130–400)
PLATELET BLD QL SMEAR: (no result)
POTASSIUM SERPL-SCNC: 4.3 MMOL/L (ref 3.5–5.1)
RBC # BLD AUTO: 2.71 MILL/UL (ref 4.2–5.4)
SODIUM SERPL-SCNC: 146 MMOL/L (ref 136–145)
WBC # BLD AUTO: 8.9 THOU/UL (ref 4.8–10.8)

## 2018-07-15 RX ADMIN — INSULIN HUMAN PRN UNIT: 100 INJECTION, SOLUTION PARENTERAL at 13:13

## 2018-07-15 RX ADMIN — POLYVINYL ALCOHOL, POVIDONE PRN DROP: 14; 6 SOLUTION/ DROPS OPHTHALMIC at 10:18

## 2018-07-15 RX ADMIN — HEPARIN SODIUM SCH UNITS: 5000 INJECTION, SOLUTION INTRAVENOUS; SUBCUTANEOUS at 20:25

## 2018-07-15 RX ADMIN — INSULIN HUMAN PRN UNIT: 100 INJECTION, SOLUTION PARENTERAL at 06:35

## 2018-07-15 RX ADMIN — SODIUM BICARBONATE SCH MG: 325 TABLET ORAL at 20:24

## 2018-07-15 RX ADMIN — SCOPALAMINE SCH MG: 1 PATCH, EXTENDED RELEASE TRANSDERMAL at 12:53

## 2018-07-15 RX ADMIN — Medication SCH ML: at 20:25

## 2018-07-15 RX ADMIN — SODIUM BICARBONATE SCH MG: 325 TABLET ORAL at 14:34

## 2018-07-15 RX ADMIN — Medication SCH ML: at 08:43

## 2018-07-15 RX ADMIN — INSULIN HUMAN PRN UNIT: 100 INJECTION, SOLUTION PARENTERAL at 18:19

## 2018-07-15 RX ADMIN — HEPARIN SODIUM SCH UNITS: 5000 INJECTION, SOLUTION INTRAVENOUS; SUBCUTANEOUS at 08:42

## 2018-07-15 NOTE — PRG
DATE OF SERVICE:  07/15/2018

 

SUBJECTIVE:  Ms. Purdy will open her eyes.  She will answer with yes/no to questions, but does not
 expound beyond that.

 

PHYSICAL EXAMINATION:

VITAL SIGNS:  Temperature is 98.8, pulse 91, respiration is 20, O2 sat 95% on room air, and blood pre
ssure 187/79.

HEENT:  Unremarkable.

NECK:  No JVD.

LUNGS:  Clear.

CARDIAC:  S1 and S2, regular.

ABDOMEN:  Soft.

EXTREMITIES:  No edema.

NEUROLOGIC:  She is hemiparetic on the left.

 

ASSESSMENT:

1.  Large right middle cerebral artery distribution stroke.

2.  Marginal neuro status.

 

PLAN:  This patient is probably going to be a PEG tube for nutrition.  She has non-anion gap metaboli
c acidosis secondary to her renal status.  Dr. Leahy has been consulted to deal with that and started h
er on bicarbonate.  There really are no pulmonary concerns at this time, but her stroke puts her at r
isk for aspiration.  I will sign off the case.  Please recall if further assistance needed.  I would 
encourage palliative care to continue to work with this family as her prognosis for recovery is very 
poor.

## 2018-07-15 NOTE — PDOC.PN
- Subjective


Encounter Start Date: 07/15/18


Encounter Start Time: 12:12





Minimally verbal.  No new issues.  





- Objective


Resuscitation Status: 


 











Resuscitation Status           FULL:Full Resuscitation














Vital Signs & Weight: 


 Vital Signs (12 hours)











  Temp Pulse Pulse Pulse Resp BP BP


 


 07/15/18 08:59    83  86   145/65 H  193/102 H


 


 07/15/18 08:50   91     


 


 07/15/18 08:42   91     


 


 07/15/18 08:30  98.8 F  91    20  


 


 07/15/18 08:00  98.8 F  91    20  


 


 07/15/18 03:50  98.7 F  87    18  














  BP Pulse Ox


 


 07/15/18 08:59  


 


 07/15/18 08:50  


 


 07/15/18 08:42  


 


 07/15/18 08:30  


 


 07/15/18 08:00  187/79 H  95


 


 07/15/18 03:50  181/75 H  100








 Weight











Admit Weight                   170 lb 6.677 oz


 


Weight                         171 lb 9.6 oz











 Most Recent Monitor Data











Heart Rate from ECG            80


 


NIBP                           163/62


 


NIBP BP-Mean                   133


 


Respiration from ECG           21


 


SpO2                           100














I&O: 


 











 07/14/18 07/15/18 07/16/18





 06:59 06:59 06:59


 


Intake Total 2908 1703 


 


Balance 2908 1703 











Result Diagrams: 


 07/15/18 04:25





 07/15/18 04:25


Additional Labs: 


 Accuchecks











  07/15/18 07/15/18 07/15/18





  12:07 06:01 00:15


 


POC Glucose  324 H  298 H  251 H














  07/14/18 07/14/18





  18:45 13:53


 


POC Glucose  179 H  184 H














Phys Exam





- Physical Examination


Sleeps much of the time.  Will awaken and follow commands.  Speaks one word


Slow to answer.


Has some upper airway rales. 


Cardiovascular: RRR, no significant murmur


Gastrointestinal: soft, non-tender, no distention


Musculoskeletal: no edema





Dx/Plan


(1) Acute CVA (cerebrovascular accident)


Code(s): I63.9 - CEREBRAL INFARCTION, UNSPECIFIED   Status: Acute   Comment: 

Dense left hemiplegia.  BP has continued to be a challenge but better.  She has 

not had any neurologic improvement in three days.  DHT placement for nutrition.

  Continue antiplatelet therapy.  Will need placement.   





(2) Hypertensive urgency


Code(s): I16.0 - HYPERTENSIVE URGENCY   Status: Acute   Comment: Still present, 

but subtle improvement.  Dr. Leahy added minoxidil.     





(3) Diabetes


Code(s): E11.9 - TYPE 2 DIABETES MELLITUS WITHOUT COMPLICATIONS   Status: 

Chronic   Comment: Will continue with SSI.  Need to adjust as feeds are 

titrated to goal.   





(4) Hypertension


Code(s): I10 - ESSENTIAL (PRIMARY) HYPERTENSION   Status: Chronic   Comment: 

Trying to use home meds.  Minoxidil was added.    





(5) Hyperlipidemia


Code(s): E78.5 - HYPERLIPIDEMIA, UNSPECIFIED   Status: Chronic   Comment: 

Resume home statin when possible.   





(6) Hemiplegia affecting left nondominant side


Code(s): G81.94 - HEMIPLEGIA, UNSPECIFIED AFFECTING LEFT NONDOMINANT SIDE   

Status: Acute   Comment: PT/OT.  Will need placement.   





(7) Dysphagia


Code(s): R13.10 - DYSPHAGIA, UNSPECIFIED   Status: Acute   Comment: Using DHT.  

Unlikely that she will recover swallow.  Need to discuss PEG with family.   





(8) CKD (chronic kidney disease) stage 4, GFR 15-29 ml/min


Code(s): N18.4 - CHRONIC KIDNEY DISEASE, STAGE 4 (SEVERE)   Status: Acute   

Comment: Renal dose meds.  Made Dr. Leahy aware of her situation. Added Bicarb 

for acidosis.   





- Plan





* Transitioning to more chronic care.  Consider PEG.  Consider placement 

options.

## 2018-07-15 NOTE — CON
DATE OF CONSULTATION:  07/15/2018

 

SERVICE:  Renal Medicine.

 

HISTORY OF PRESENT ILLNESS:  Ms. Purdy is a 73-year-old black female with chronic renal failure fr
om her hypertensive nephropathy and admitted for left-sided hemiplegia secondary to a CVA.  We are be
ing consulted for her chronic renal failure.  Creatinine has been fluctuating the last few days.

 

REVIEW OF SYSTEMS:  Not obtainable since the patient is not following commands.

 

MEDICATIONS:  Aspirin 300 mg tab daily, Norvasc 10 mg daily, Plavix 75 mg once a day, hydralazine 10 
mg p.r.n., heparin 5000 units subcutaneous b.i.d., Humulin R sliding scale, Imdur 60 mg daily, Leflun
omide 20 mg daily, nicardipine drip - on hold, Zofran p.r.n.

 

PAST MEDICAL HISTORY:  Includes the following, recent diagnosis of CVA with left hemiplegia, type 2 d
iabetes mellitus, chronic renal failure from hypertensive nephropathy, longstanding history of hypert
ension.

 

SOCIAL HISTORY:  The patient lives in [place].  Single, several children.  Currently, no smoking, 
no alcohol intake.  Status post blood transfusion.  No IV drug abuse.

 

ALLERGIES:  No known drug allergies.

 

TRAUMA:  None.

 

IMMUNIZATIONS:  Up to date.

 

HOSPITALIZATIONS:  Please see past medical history.

 

FAMILY HISTORY:  No family history of ESRD.

 

PHYSICAL EXAMINATION:

VITAL SIGNS:  Blood pressure is noted at 187/79, heart rate 91, respiratory rate 20, temperature 98.8
, pulse ox 95%.

GENERAL:  The patient is somnolent.  Minimal respond to verbal stimuli.

SKIN:  Adequate turgor.

HEENT:  She has slightly pale conjunctivae, anicteric sclerae.

NECK:  No neck mass, no carotid bruits, no JVD.

CHEST:  No deformities.

LUNGS:  Decreased breath sounds.

HEART:  Normal sinus rhythm.  No murmur, no gallops, no rubs.

ABDOMEN:  Globular, soft, nontender, no masses.

EXTREMITIES:  No edema.

NEUROLOGIC:  Decreased mentation, left-sided weakness.  No tremors, no asterixis.

 

LABORATORY DATA AND X-RAY FINDINGS:  Of 07/15/2018, white count 8.9, hemoglobin 7.9.  Sodium 146, pot
assium 4.3, chloride 123, carbon dioxide 13, BUN is 82, creatinine 3.57, glucose 303, calcium 8.6.  F
urther review of her creatinine shows the following, 03/14/2018, 3.37; 03/13/2018, 3.65, 07/12/2018, 
creatinine is 3.7; 07/11/2018, creatinine is 3.59;

05/07/2018, creatinine was 3.14.

 

ASSESSMENT AND PLAN:

1.  Chronic renal failure.  This is secondary to hypertensive nephropathy.  During this hospitalizati
on, the creatinine is relatively stable, but over time as a followup in the renal clinic - her renal 
function has been worsening, which may actually reflect disease progression.  Continue supportive car
e.  There is no indication for any dialytic intervention.

2.  Status post cerebrovascular accident/left hemiplegia, supportive care.

3.  Metabolic acidosis.  Start sodium bicarbonate 650 mg t.i.d.

4.  Borderline anemia - we will continue to observe.  We will try first to optimize blood pressure be
fore starting Epogen.

5.  Hypertension.  I will probably add minoxidil 5 mg tab q.a.m.

## 2018-07-16 LAB
ANION GAP SERPL CALC-SCNC: 13 MMOL/L (ref 10–20)
BASOPHILS # BLD AUTO: 0 THOU/UL (ref 0–0.2)
BASOPHILS NFR BLD AUTO: 0 % (ref 0–1)
BUN SERPL-MCNC: 88 MG/DL (ref 9.8–20.1)
CALCIUM SERPL-MCNC: 9 MG/DL (ref 7.8–10.44)
CHLORIDE SERPL-SCNC: 125 MMOL/L (ref 98–107)
CO2 SERPL-SCNC: 15 MMOL/L (ref 23–31)
CREAT CL PREDICTED SERPL C-G-VRATE: 17 ML/MIN (ref 70–130)
EOSINOPHIL # BLD AUTO: 0 THOU/UL (ref 0–0.7)
EOSINOPHIL NFR BLD AUTO: 0.2 % (ref 0–10)
GLUCOSE SERPL-MCNC: 424 MG/DL (ref 83–110)
HGB BLD-MCNC: 8.1 G/DL (ref 12–16)
LYMPHOCYTES # BLD: 0.6 THOU/UL (ref 1.2–3.4)
LYMPHOCYTES NFR BLD AUTO: 7.4 % (ref 21–51)
MCH RBC QN AUTO: 29.3 PG (ref 27–31)
MCV RBC AUTO: 83.4 FL (ref 78–98)
MONOCYTES # BLD AUTO: 0.6 THOU/UL (ref 0.11–0.59)
MONOCYTES NFR BLD AUTO: 6.9 % (ref 0–10)
NEUTROPHILS # BLD AUTO: 7.2 THOU/UL (ref 1.4–6.5)
NEUTROPHILS NFR BLD AUTO: 85.5 % (ref 42–75)
PLATELET # BLD AUTO: 126 THOU/UL (ref 130–400)
POTASSIUM SERPL-SCNC: 4.4 MMOL/L (ref 3.5–5.1)
RBC # BLD AUTO: 2.77 MILL/UL (ref 4.2–5.4)
SODIUM SERPL-SCNC: 149 MMOL/L (ref 136–145)
WBC # BLD AUTO: 8.5 THOU/UL (ref 4.8–10.8)

## 2018-07-16 RX ADMIN — SODIUM BICARBONATE SCH MG: 325 TABLET ORAL at 08:22

## 2018-07-16 RX ADMIN — SODIUM BICARBONATE SCH MG: 325 TABLET ORAL at 16:05

## 2018-07-16 RX ADMIN — HEPARIN SODIUM SCH UNITS: 5000 INJECTION, SOLUTION INTRAVENOUS; SUBCUTANEOUS at 21:42

## 2018-07-16 RX ADMIN — SODIUM BICARBONATE SCH MG: 325 TABLET ORAL at 21:42

## 2018-07-16 RX ADMIN — INSULIN HUMAN PRN UNIT: 100 INJECTION, SOLUTION PARENTERAL at 00:08

## 2018-07-16 RX ADMIN — INSULIN HUMAN PRN UNIT: 100 INJECTION, SOLUTION PARENTERAL at 05:39

## 2018-07-16 RX ADMIN — HEPARIN SODIUM SCH UNITS: 5000 INJECTION, SOLUTION INTRAVENOUS; SUBCUTANEOUS at 08:22

## 2018-07-16 RX ADMIN — Medication SCH: at 21:50

## 2018-07-16 RX ADMIN — INSULIN HUMAN PRN UNIT: 100 INJECTION, SOLUTION PARENTERAL at 18:22

## 2018-07-16 RX ADMIN — INSULIN HUMAN PRN UNIT: 100 INJECTION, SOLUTION PARENTERAL at 12:48

## 2018-07-16 RX ADMIN — POLYVINYL ALCOHOL, POVIDONE PRN DROP: 14; 6 SOLUTION/ DROPS OPHTHALMIC at 08:23

## 2018-07-16 RX ADMIN — Medication SCH ML: at 08:23

## 2018-07-16 RX ADMIN — INSULIN HUMAN PRN UNIT: 100 INJECTION, SOLUTION PARENTERAL at 21:51

## 2018-07-16 NOTE — CON
DATE OF CONSULTATION:  07/16/2018

 

HISTORY OF PRESENT ILLNESS:  The patient is a 73-year-old  female who was in her norm
al state of health until last week when she developed altered mental status.  She was brought to the 
hospital and subsequently diagnosed with a large right hemispheric infarct in the right MCA distribut
ion.  She denies any pain or any other problems.

 

PAST MEDICAL HISTORY:  Diabetes, hypertension, cerebrovascular accident.

 

SOCIAL HISTORY:  She does not smoke or drink.  

 

ALLERGIES:  LISINOPRIL.

 

MEDICATIONS:  Insulin 50 units subcu b.i.d., pravastatin 40 mg p.o. at bedtime, ____ 20 mg p daily, N
orvasc 10 mg p.o. daily, isosorbide mononitrate 60 mg p.o. daily, Coreg 25 mg p.o. b.i.d., calcitriol
 0.25 mcg p.o. daily, clonidine 0.2 mg p.o. q.8 h, Plavix 75 mg p.o. every day, Glipizide 2 p.o. b.i.
d.

 

REVIEW OF SYSTEMS:  Unobtainable.

 

PHYSICAL EXAMINATION:

GENERAL:  Shows an elderly black female in no acute distress.

VITAL SIGNS:  Temperature 98.6, pulse 83, respiratory rate 18, blood pressure 151/61.

HEENT:  Significant for a nasogastric tube in place.

NECK:  Supple.

CHEST:  Clear.

CARDIOVASCULAR:  Regular rate and rhythm.

ABDOMEN:  Soft, nontender, without organomegaly or masses.  Bowel sounds are present and normoactive.


RECTAL:  Deferred.

EXTREMITIES:  Normal.

NEUROLOGIC:  Nonfocal.

 

LABORATORY:  Shows a hemoglobin of 8.1, hematocrit 23.1, platelet count 126,000.  PT is 14.5 with an 
INR of 1.1.  Chemistries show a chloride of 113, CO2 19, BUN 45, creatinine 3.59, glucose 174, alkali
ne phosphatase 183.

 

ASSESSMENT: 

1.   Oropharyngeal dysphagia secondary to cerebrovascular accident.

2.  Cerebrovascular accident.

3.  Chronic renal disease.

 

RECOMMENDATIONS:  

1.  Esophagogastroduodenoscopy and PEG tomorrow.  

2.  Continue aspirin and Plavix.

3.  Hold tube feedings at midnight.

## 2018-07-16 NOTE — PDOC.PN
- Subjective


Encounter Start Date: 07/16/18


Encounter Start Time: 09:00


Subjective: awake, not oriented or follows verbal stimuli


-: moves right UE freely


-: unable to clear oral secretions





- Objective


Resuscitation Status: 


 











Resuscitation Status           FULL:Full Resuscitation














MAR Reviewed: Yes


Vital Signs & Weight: 


 Vital Signs (12 hours)











  Temp Pulse Pulse Pulse Resp BP BP


 


 07/16/18 11:48  98.6 F  83    18  


 


 07/16/18 09:30    80  82   130/60  137/57 L


 


 07/16/18 08:19   94     


 


 07/16/18 07:38  98.5 F  94    22 H  


 


 07/16/18 04:00  99.1 F  90    24 H  


 


 07/16/18 03:59       


 


 07/16/18 03:27   86     














  BP Pulse Ox


 


 07/16/18 11:48  151/61 H  94 L


 


 07/16/18 09:30  


 


 07/16/18 08:19  


 


 07/16/18 07:38  201/80 H  95


 


 07/16/18 04:00  205/76 H  98


 


 07/16/18 03:59  150/68 H 


 


 07/16/18 03:27  








 Weight











Admit Weight                   170 lb 6.677 oz


 


Weight                         176 lb 12.8 oz











 Most Recent Monitor Data











Heart Rate from ECG            80


 


NIBP                           163/62


 


NIBP BP-Mean                   133


 


Respiration from ECG           21


 


SpO2                           100














I&O: 


 











 07/15/18 07/16/18 07/17/18





 06:59 06:59 06:59


 


Intake Total 1703 740 


 


Balance 1703 740 











Result Diagrams: 


 07/16/18 05:38





 07/16/18 05:38


Additional Labs: 


 Accuchecks











  07/16/18 07/16/18 07/16/18





  12:19 05:38 00:03


 


POC Glucose  438 H  416 H  358 H














  07/15/18





  18:18


 


POC Glucose  378 H














Phys Exam





- Physical Examination


HEENT: PERRLA, sclera anicteric


Neck: no JVD, supple


Respiratory: no wheezing, no rales


rhonchi+


Cardiovascular: RRR, no significant murmur


Gastrointestinal: soft, no distention, positive bowel sounds


Musculoskeletal: no edema, pulses present


left hemiplegia, aphasia and dysphagia





Dx/Plan


(1) Acute CVA (cerebrovascular accident)


Code(s): I63.9 - CEREBRAL INFARCTION, UNSPECIFIED   Status: Acute   Comment: 

Dense left hemiplegia with large right MCA cva   





(2) CKD (chronic kidney disease) stage 4, GFR 15-29 ml/min


Code(s): N18.4 - CHRONIC KIDNEY DISEASE, STAGE 4 (SEVERE)   Status: Chronic   





(3) Dysphagia


Code(s): R13.10 - DYSPHAGIA, UNSPECIFIED   Status: Acute   





(4) Hemiplegia affecting left nondominant side


Code(s): G81.94 - HEMIPLEGIA, UNSPECIFIED AFFECTING LEFT NONDOMINANT SIDE   

Status: Acute   Comment: PT/OT.  Will need placement.   





(5) Hypertensive urgency


Code(s): I16.0 - HYPERTENSIVE URGENCY   Status: Acute   





(6) Diabetes


Code(s): E11.9 - TYPE 2 DIABETES MELLITUS WITHOUT COMPLICATIONS   Status: 

Chronic   


Qualifiers: 


   Diabetes mellitus type: type 2   Diabetes mellitus long term insulin use: 

without long term use   Diabetes mellitus complication status: with kidney 

complications   Diabetes mellitus complication detail: with chronic kidney 

disease   Chronic kidney disease stage: stage 4 (severe)   Qualified Code(s): 

E11.22 - Type 2 diabetes mellitus with diabetic chronic kidney disease; N18.4 - 

Chronic kidney disease, stage 4 (severe); N18.4 - Chronic kidney disease, stage 

4 (severe); N18.4 - Chronic kidney disease, stage 4 (severe); N18.4 - Chronic 

kidney disease, stage 4 (severe)   





(7) Hyperlipidemia


Code(s): E78.5 - HYPERLIPIDEMIA, UNSPECIFIED   Status: Chronic   


Qualifiers: 


   Hyperlipidemia type: unspecified   Qualified Code(s): E78.5 - Hyperlipidemia

, unspecified   


Comment: Resume home statin when possible.   





(8) Hypertension


Code(s): I10 - ESSENTIAL (PRIMARY) HYPERTENSION   Status: Chronic   





(9) NORA (acute kidney injury)


Code(s): N17.9 - ACUTE KIDNEY FAILURE, UNSPECIFIED   Status: Acute   





(10) Metabolic acidosis


Code(s): E87.2 - ACIDOSIS   Status: Acute   





(11) FTT (failure to thrive) in adult


Status: Acute   





- Plan


prognosis gaurded, d/w daughter over phone


-: family wants peg placed


-: will need snf placement


-: is on asp, plavix, coreg, imdur, minoxidil and pravachol


-: 1/2 ns for hydration, d/w 





* .


add lantus 10u bid with aggressive scale cov until glucose drops to less than 

200.


Discussed about code status with daughter and palliative care, they need time 

to come around, she will talk to her brothers and discuss about it.





Review of Systems





- Medications/Allergies


Allergies/Adverse Reactions: 


 Allergies











Allergy/AdvReac Type Severity Reaction Status Date / Time


 


lisinopril [From Prinivil] Allergy   Verified 07/15/18 01:44











Medications: 


 Current Medications





Amlodipine Besylate (Norvasc)  10 mg PO DAILY [facility]


   Last Admin: 07/16/18 08:19 Dose:  10 mg


Artificial Tears (Tears Naturale)  2 drop EA EYE PRN PRN


   PRN Reason: DRY EYES


   Last Admin: 07/16/18 08:23 Dose:  2 drop


Aspirin (Aspirin)  300 mg WA DAILY [facility]


   Last Admin: 07/16/18 09:27 Dose:  300 mg


Carvedilol (Coreg)  25 mg PO BID [facility]


   Last Admin: 07/16/18 08:22 Dose:  25 mg


Clopidogrel Bisulfate (Plavix)  75 mg PO DAILY [facility]


   Last Admin: 07/16/18 08:20 Dose:  75 mg


Dextrose/Water (Dextrose 50%)  25 gm SLOW IVP PRN PRN


   PRN Reason: Hypoglycemia


Glucagon (Glucagon)  1 mg IM PRN PRN


   PRN Reason: Hypoglycemia


Heparin Sodium (Porcine) (Heparin)  5,000 units SC BID [facility]


   Last Admin: 07/16/18 08:22 Dose:  5,000 units


Hydralazine HCl (Apresoline)  10 mg SLOW IVP Q4H PRN


   PRN Reason: SBP>185


   Last Admin: 07/16/18 03:27 Dose:  10 mg


Dextrose/Water (D5w)  1,000 mls @ 0 mls/hr IV .Q0M PRN; As Directed


   PRN Reason: Hypoglycemia


Nicardipine HCl 50 mg/ Sodium (Chloride)  250 mls @ 0 mls/hr IVPB INF JACQUELNIE; 

Titrate


   PRN Reason: Protocol


   Last Admin: 07/14/18 10:37 Dose:  250 mls


Sodium Chloride (1/2 Normal Saline)  1,000 mls @ 150 mls/hr IV .Q6H40M [facility]


   Last Admin: 07/16/18 09:28 Dose:  Not Given


Insulin Human Regular (Humulin R)  0 units SC .AGGRESSIVE SLIDING PRN; Protocol


   PRN Reason: AGGRESSIVE SLIDING SCALE


Isosorbide Mononitrate (Imdur)  60 mg PO DAILY [facility]


   Last Admin: 07/16/18 08:21 Dose:  60 mg


Minoxidil (Minoxidil)  5 mg PO DAILY [facility]


   Last Admin: 07/16/18 08:21 Dose:  5 mg


Ondansetron HCl (Zofran)  4 mg IVP Q6H PRN


   PRN Reason: Nausea/Vomiting


Pravastatin Sodium (Pravachol)  40 mg PO HS [facility]


   Last Admin: 07/15/18 20:24 Dose:  40 mg


Scopolamine (Transderm Scop)  1.5 mg TD Q3D [facility]


   Last Admin: 07/15/18 12:53 Dose:  1.5 mg


Sodium Bicarbonate (Bicarbonate, Sodium)  650 mg PO TID [facility]


   Last Admin: 07/16/18 08:22 Dose:  650 mg


Sodium Chloride (Flush - Normal Saline)  10 ml IVF Q12HR [facility]


   Last Admin: 07/16/18 08:23 Dose:  10 ml


Sodium Chloride (Flush - Normal Saline)  10 ml IVF PRN PRN


   PRN Reason: Saline Flush

## 2018-07-16 NOTE — PRG
DATE OF SERVICE:  07/16/2018

 

SERVICE:  Renal Medicine.

 

SUBJECTIVE:  Ms. Purdy is a 73-year-old black female with chronic renal failure from hypertensive 
nephropathy and admitted for CVA.  She has a left-sided hemiplegia.  We are following her up for her 
chronic renal failure.  Creatinine this morning was noted at 3.72, which is slightly higher from yest
erday of 3.57.  Currently, she has been started on half normal saline.  I decided to increase it from
 125 to 150 mL per hour.  No other complaints.

 

PHYSICAL EXAMINATION:

VITAL SIGNS:  Blood pressure 201/80, heart rate 94, respiratory rate 22, temperature 98.5, pulse ox 9
5%.

GENERAL:  The patient is lethargic, but arousable, and can follow simple commands.

SKIN:  Adequate turgor.

HEENT:  She has slightly pale conjunctivae, anicteric sclerae.

NECK:  No neck mass, no carotid bruits, no JVD.

CHEST:  No deformities.

LUNGS:  Clear breath sounds.

HEART:  Normal sinus rhythm.  No murmur, no gallops, no rubs.

ABDOMEN:  Globular, soft, nontender, no masses.

EXTREMITIES:  No edema, no deformities.

 

MEDICATIONS:  Of 07/16/2018 was reviewed.

 

LABORATORY DATA:  Of 07/16/2018 showed a white count of 8.5, hemoglobin 8.1.  Sodium 146, potassium 4
.3, chloride 123, carbon dioxide 13, BUN 82, creatinine 3.57.  On 07/16/2018, lab work showed a sodiu
m of 149, potassium 4.4, chloride 125, carbon dioxide 15, BUN 88, creatinine 3.72, glucose 424, calci
um 9.

 

ASSESSMENT AND PLAN:

1.  Acute kidney injury on top of her chronic renal failure - I suspect a component of prerenal azote
flor.  Agree to start the patient on 1/2 normal saline - we will increase it from 125 to 150 mL per ho
ur.  No indication for any dialytic intervention.

2.  Mild hypernatremia - start on half normal saline 150 mL an hour.

3.  Metabolic acidosis - sodium bicarbonate has been started.

4.  Anemia.  Continue to observe, p.r.n. blood transfusion.  Once blood pressure is improved, we will
 start Epogen.

5.  Hypertension.  Recently I added minoxidil.  Continue current blood pressure regimen.  Adjust medi
cations as needed.

 

Overall, prognosis remains guarded.

## 2018-07-17 LAB
ANION GAP SERPL CALC-SCNC: 13 MMOL/L (ref 10–20)
BUN SERPL-MCNC: 80 MG/DL (ref 9.8–20.1)
CALCIUM SERPL-MCNC: 9 MG/DL (ref 7.8–10.44)
CHLORIDE SERPL-SCNC: 124 MMOL/L (ref 98–107)
CO2 SERPL-SCNC: 16 MMOL/L (ref 23–31)
CREAT CL PREDICTED SERPL C-G-VRATE: 19 ML/MIN (ref 70–130)
GLUCOSE SERPL-MCNC: 267 MG/DL (ref 83–110)
HGB BLD-MCNC: 8.2 G/DL (ref 12–16)
MCH RBC QN AUTO: 29 PG (ref 27–31)
MCV RBC AUTO: 83.1 FL (ref 78–98)
MDIFF COMPLETE?: YES
PLATELET # BLD AUTO: 107 THOU/UL (ref 130–400)
PLATELET BLD QL SMEAR: (no result)
POTASSIUM SERPL-SCNC: 4.6 MMOL/L (ref 3.5–5.1)
RBC # BLD AUTO: 2.84 MILL/UL (ref 4.2–5.4)
SODIUM SERPL-SCNC: 148 MMOL/L (ref 136–145)
WBC # BLD AUTO: 7.7 THOU/UL (ref 4.8–10.8)

## 2018-07-17 PROCEDURE — 0DH63UZ INSERTION OF FEEDING DEVICE INTO STOMACH, PERCUTANEOUS APPROACH: ICD-10-PCS | Performed by: INTERNAL MEDICINE

## 2018-07-17 RX ADMIN — INSULIN GLARGINE SCH MLS: 100 INJECTION, SOLUTION SUBCUTANEOUS at 20:38

## 2018-07-17 RX ADMIN — INSULIN HUMAN PRN UNIT: 100 INJECTION, SOLUTION PARENTERAL at 17:43

## 2018-07-17 RX ADMIN — INSULIN HUMAN PRN UNIT: 100 INJECTION, SOLUTION PARENTERAL at 06:00

## 2018-07-17 RX ADMIN — SODIUM BICARBONATE SCH MG: 325 TABLET ORAL at 08:14

## 2018-07-17 RX ADMIN — Medication SCH: at 08:14

## 2018-07-17 RX ADMIN — SODIUM BICARBONATE SCH MG: 325 TABLET ORAL at 20:38

## 2018-07-17 RX ADMIN — HEPARIN SODIUM SCH: 5000 INJECTION, SOLUTION INTRAVENOUS; SUBCUTANEOUS at 20:36

## 2018-07-17 RX ADMIN — Medication SCH: at 20:44

## 2018-07-17 RX ADMIN — HEPARIN SODIUM SCH: 5000 INJECTION, SOLUTION INTRAVENOUS; SUBCUTANEOUS at 08:14

## 2018-07-17 RX ADMIN — SODIUM BICARBONATE SCH MG: 325 TABLET ORAL at 14:15

## 2018-07-17 NOTE — PQF
CLINICAL DOCUMENTATION IMPROVEMENT CLARIFICATION FORM:  ICD-10 Updated



PLEASE DO AN ADDENDUM TO THE PROGRESS NOTE WITH ANY DOCUMENTATION UPDATES OR 
ADDITIONS AND CARRY THROUGH TO DC SUMMARY.   THANK YOU.



DATE:      7/17                                                            ATTN
:  DR. GRICELDA WATTS



Please exercise your independent, professional judgment in responding to the 
clarification form. Clinical indicators are provided on the bottom of this form 
for your review.



Please check appropriate box(s):



[ x ] Encephalopathy:

   Type:        [ x ] Acute                  [  ] Subacute       [  ] Chronic

   Etiology:   [  ] Hypertensive        [  ] Metabolic       [  ] Toxic      

                            [  ] Unspecified ______________ 

                            [ x ] Other (please specify) sec to cva 



[  ] Other diagnosis ___________

[  ] Unable to determine



For continuity of documentation, please document condition throughout progress 
notes and discharge summary.  Thank You.



CLINICAL INDICATORS - SIGNS / SYMPTOMS / LABS



ER PRESENTATION 7/11:  PT PRESENTS VIA PHI FOR AMS SINCE THIS MORNING



PULMONOLOGY CONSULT 7/11:  PT PRESENTED W/AMS;  7/13 PN:  CONTINUED DECLINE IN 
MENTAL STATUS



NEPHROLOGY CONSULT 7/15:  MINIMAL RESPONSE TO VERBAL STIMULI; DECREASED 
MENTATION;  PN 7/16:  LETHARGIC BUT AROUSABLE;  PN 7/17:  MORE LETHARGIC & 
UNRESPONSIVE TO MY VERBAL STIMULI



ATTENDING PN 7/16:  NOT ORIENTED OR FOLLOWING VERBAL STIMULI; PN 7/17:  
LETHARGIC THIS AM, NOT WAKING UP TO VERBAL STIMULI OR TOUCH; HAD MASSIVE CVA & 
IS ENCEPHALOPATHIC SINCE THEN



RISK FACTORS:

LARGE R MCA ISCHEMIC CVA

HYPERTENSIVE URGENCY



TREATMENTS:

NICARDIPINE GTT (7/12 - PRESENT)

CCU MONITORING (7/11 - 7/14)

NEUROLOGY CONSULT





THANK YOU!  Adela



(This form is maintained as a part of the permanent medical record)

 2015 [employer].  All Rights Reserved

Adela Clarke, RN, BSN    [email or web address]    Office:  [phone]

                                                              

[facility]

## 2018-07-17 NOTE — PDOC.PN
- Subjective


Encounter Start Date: 07/17/18


Encounter Start Time: 09:15


Subjective: lethargic this am, not waking up to verbal stimuli or touch


-: not in distress





- Objective


Resuscitation Status: 


 











Resuscitation Status           FULL:Full Resuscitation














MAR Reviewed: Yes


Vital Signs & Weight: 


 Vital Signs (12 hours)











  Temp Pulse Resp BP BP BP Pulse Ox


 


 07/17/18 09:18      180/60 H  


 


 07/17/18 09:05  98.3 F  88  20    


 


 07/17/18 08:13   88     


 


 07/17/18 07:19  98.3 F  88  20    182/83 H  96


 


 07/17/18 04:43       174/76 H 


 


 07/17/18 03:08  97.8 F  88  22 H    195/72 H  95


 


 07/17/18 02:25   85   229/86 H   








 Weight











Admit Weight                   170 lb 6.677 oz


 


Weight                         178 lb 11.2 oz











 Most Recent Monitor Data











Heart Rate from ECG            80


 


NIBP                           163/62


 


NIBP BP-Mean                   133


 


Respiration from ECG           21


 


SpO2                           100














I&O: 


 











 07/16/18 07/17/18 07/18/18





 06:59 06:59 06:59


 


Intake Total 740 3841 


 


Balance 740 3841 











Result Diagrams: 


 07/17/18 05:56





 07/17/18 05:56


Additional Labs: 


 Accuchecks











  07/17/18 07/17/18 07/16/18





  05:22 00:05 21:33


 


POC Glucose  270 H  400 H  431 H














  07/16/18 07/16/18





  18:16 12:19


 


POC Glucose  425 H  438 H














Phys Exam





- Physical Examination


HEENT: PERRLA, sclera anicteric


Neck: no JVD, supple


Respiratory: no wheezing, no rales


Cardiovascular: RRR, no significant murmur


Gastrointestinal: soft, non-tender, positive bowel sounds


Musculoskeletal: no edema, pulses present


left hemiplegia, dysphagia, aphasia





Dx/Plan


(1) Acute CVA (cerebrovascular accident)


Code(s): I63.9 - CEREBRAL INFARCTION, UNSPECIFIED   Status: Acute   Comment: 

Dense left hemiplegia with large right MCA cva   





(2) CKD (chronic kidney disease) stage 4, GFR 15-29 ml/min


Code(s): N18.4 - CHRONIC KIDNEY DISEASE, STAGE 4 (SEVERE)   Status: Chronic   





(3) Dysphagia


Code(s): R13.10 - DYSPHAGIA, UNSPECIFIED   Status: Acute   





(4) Hemiplegia affecting left nondominant side


Code(s): G81.94 - HEMIPLEGIA, UNSPECIFIED AFFECTING LEFT NONDOMINANT SIDE   

Status: Acute   Comment: PT/OT.  Will need placement.   





(5) Hypertensive urgency


Code(s): I16.0 - HYPERTENSIVE URGENCY   Status: Acute   





(6) Diabetes


Code(s): E11.9 - TYPE 2 DIABETES MELLITUS WITHOUT COMPLICATIONS   Status: 

Chronic   


Qualifiers: 


   Diabetes mellitus type: type 2   Diabetes mellitus long term insulin use: 

without long term use   Diabetes mellitus complication status: with kidney 

complications   Diabetes mellitus complication detail: with chronic kidney 

disease   Chronic kidney disease stage: stage 4 (severe)   Qualified Code(s): 

E11.22 - Type 2 diabetes mellitus with diabetic chronic kidney disease; N18.4 - 

Chronic kidney disease, stage 4 (severe); N18.4 - Chronic kidney disease, stage 

4 (severe); N18.4 - Chronic kidney disease, stage 4 (severe); N18.4 - Chronic 

kidney disease, stage 4 (severe)   





(7) Hyperlipidemia


Code(s): E78.5 - HYPERLIPIDEMIA, UNSPECIFIED   Status: Chronic   


Qualifiers: 


   Hyperlipidemia type: unspecified   Qualified Code(s): E78.5 - Hyperlipidemia

, unspecified   


Comment: Resume home statin when possible.   





(8) Hypertension


Code(s): I10 - ESSENTIAL (PRIMARY) HYPERTENSION   Status: Chronic   





(9) NORA (acute kidney injury)


Code(s): N17.9 - ACUTE KIDNEY FAILURE, UNSPECIFIED   Status: Acute   





(10) Metabolic acidosis


Code(s): E87.2 - ACIDOSIS   Status: Acute   





(11) FTT (failure to thrive) in adult


Status: Acute   





- Plan


for peg today


-: start all antihypertensives via peg when allowed


-:  prognosis guarded, had massive cva and is encephalopathic since then


-: family is aware of above, are yet to decide about code status


-: placement pending, likely dc in 24-36hrs once she tolerated feeding





* .








Review of Systems





- Medications/Allergies


Allergies/Adverse Reactions: 


 Allergies











Allergy/AdvReac Type Severity Reaction Status Date / Time


 


lisinopril [From Prinivil] Allergy   Verified 07/15/18 01:44











Medications: 


 Current Medications





Amlodipine Besylate (Norvasc)  10 mg PO DAILY [facility]


   Last Admin: 07/17/18 08:13 Dose:  10 mg


Artificial Tears (Tears Naturale)  2 drop EA EYE PRN PRN


   PRN Reason: DRY EYES


   Last Admin: 07/16/18 08:23 Dose:  2 drop


Aspirin (Aspirin)  300 mg ND DAILY [facility]


   Last Admin: 07/16/18 09:27 Dose:  300 mg


Carvedilol (Coreg)  25 mg PO BID [facility]


   Last Admin: 07/17/18 08:13 Dose:  25 mg


Clopidogrel Bisulfate (Plavix)  75 mg PO DAILY [facility]


   Last Admin: 07/16/18 08:20 Dose:  75 mg


Dextrose/Water (Dextrose 50%)  25 gm SLOW IVP PRN PRN


   PRN Reason: Hypoglycemia


Glucagon (Glucagon)  1 mg IM PRN PRN


   PRN Reason: Hypoglycemia


Heparin Sodium (Porcine) (Heparin)  5,000 units SC BID [facility]


   Last Admin: 07/17/18 08:14 Dose:  Not Given


Hydralazine HCl (Apresoline)  10 mg SLOW IVP Q4H PRN


   PRN Reason: SBP>185


   Last Admin: 07/17/18 02:25 Dose:  10 mg


Dextrose/Water (D5w)  1,000 mls @ 0 mls/hr IV .Q0M PRN; As Directed


   PRN Reason: Hypoglycemia


Nicardipine HCl 50 mg/ Sodium (Chloride)  250 mls @ 0 mls/hr IVPB INF [facility]; 

Titrate


   PRN Reason: Protocol


   Last Admin: 07/14/18 10:37 Dose:  250 mls


Sodium Chloride (1/2 Normal Saline)  1,000 mls @ 150 mls/hr IV .Q6H40M [facility]


   Last Admin: 07/17/18 07:12 Dose:  1,000 mls


Insulin Glargine 10 units/ (Miscellaneous Medication)  0.1 mls @ 0 mls/hr SC 

BID [facility]


Insulin Human Regular (Humulin R)  0 units SC .AGGRESSIVE SLIDING PRN; Protocol


   PRN Reason: AGGRESSIVE SLIDING SCALE


   Last Admin: 07/17/18 06:00 Dose:  9 unit


Isosorbide Mononitrate (Imdur)  60 mg PO DAILY [facility]


   Last Admin: 07/17/18 08:14 Dose:  60 mg


Minoxidil (Minoxidil)  10 mg PO DAILY [facility]


Ondansetron HCl (Zofran)  4 mg IVP Q6H PRN


   PRN Reason: Nausea/Vomiting


Pravastatin Sodium (Pravachol)  40 mg PO [facility]


   Last Admin: 07/16/18 21:43 Dose:  40 mg


Scopolamine (Transderm Scop)  1.5 mg TD Q3D [facility]


   Last Admin: 07/15/18 12:53 Dose:  1.5 mg


Sodium Bicarbonate (Bicarbonate, Sodium)  650 mg PO TID [facility]


   Last Admin: 07/17/18 08:14 Dose:  650 mg


Sodium Chloride (Flush - Normal Saline)  10 ml IVF Q12HR [facility]


   Last Admin: 07/17/18 08:14 Dose:  Not Given


Sodium Chloride (Flush - Normal Saline)  10 ml IVF PRN PRN


   PRN Reason: Saline Flush

## 2018-07-17 NOTE — OP
DATE OF PROCEDURE:  07/17/2018

 

GI ENDOSCOPY NOTE

 

SURGEON:  Modesto Adan M.D.

 

ASSISTANT SURGEON:  None.

 

PROCEDURE:  Esophagogastroduodenoscopy with PEG tube placement.

 

INDICATION:  Oropharyngeal dysphagia following stroke.

 

MEDICATIONS:

1.  See anesthesia record.

2.  Ancef 2 grams IV.

 

FINDINGS:  After discussion of the risks, benefits and alternatives of the procedure, informed consen
t was obtained and witnessed.  Pre-endoscopic cardiopulmonary examination was satisfactory.  Timeout 
was performed before sedation was achieved.  Sedation was achieved with anesthesia assistance in the 
endoscopy unit.  The patient was placed in the supine position.  A Pentax adult upper endoscope was p
laced into the oropharynx and passed through the cricopharyngeus under direct visualization.  The eso
phageal mucosa appeared normal.  The endoscope was advanced into the stomach.  Forward and retroflexe
d views of the entire gastric mucosa were obtained.  The gastric mucosa appeared normal.  The endosco
pe was then advanced through the pylorus and into the first and second portions of the duodenum, whic
h also appeared normal.  The endoscope was then withdrawn back into the gastric lumen.  Using one-to-
one pressure and transillumination methods, a suitable site for PEG placement was located in the left
 upper quadrant.  The site was prepped and draped in a sterile fashion and then anesthetized with sub
cutaneous lidocaine.  A 1 cm vertical incision was then made and the introducer needle and catheter w
ere then introduced transcutaneously into the gastric lumen.  The needle was withdrawn and the cathet
er was grasped with a snare through the endoscope.  The wire was passed through the catheter and gras
ped with the snare and then the wire was withdrawn out of the mouth.  The 20-French traction PEG tube
 was affixed to the wire and pulled through into position in the usual fashion without difficulty.  T
he external bumper clamp and ports were then affixed to the tube and the external bumper was tightene
d to a distance of 3 cm.  The endoscope was then passed back into the stomach to view the internal bu
mper which appeared to be in good position with no complications apparent.  The upper endoscope was t
hen completely withdrawn and the patient was allowed to recover.  The patient tolerated the procedure
 well.  There were no immediate post-procedure complications.

 

IMPRESSION:  Successful placement of 20-French traction PEG tube to the left upper quadrant, with ext
ernal bumper at a distance of 3 cm.

 

RECOMMENDATIONS:

1.  May use the PEG tube for medications now.

2.  May use the tube for feeds in 4 hours.

3.  Flush tube regularly.

4.  We will plan to come by tomorrow to check on the PEG site and possibly adjust the external bumper
 as needed.

## 2018-07-17 NOTE — PRG
DATE OF SERVICE:  07/17/2018

 

SUBJECTIVE:  Ms. Purdy is a 73-year-old black female with known history of hypertension, chronic r
enal failure and admitted for CVA.  She has a left hemiplegia.  This morning she is more lethargic an
d unresponsive to my verbal stimuli.  Her renal function is being monitored and is actually stable at
 the present time.  No acute events last night.

 

OBJECTIVE:

VITAL SIGNS:  Blood pressure is 182/83, heart rate 88, respiratory rate 20, temperature 98.2, pulse o
x 96%.

GENERAL:  The patient is unresponsive to verbal stimuli.

SKIN:  Adequate turgor. 

HEENT:  She has slightly pale conjunctivae, anicteric sclerae.

NECK:  No neck mass, no carotid bruits, no JVD.

CHEST:  No deformities.

LUNGS:  Clear breath sounds.  No wheezing, no crackles.

HEART:  Normal sinus rhythm.  No murmur, no gallops or rubs.

ABDOMEN:  Globular, soft, nontender, no masses.

EXTREMITIES:  No edema, no deformities.

NEUROLOGIC:  Decreased mentation.  Left-sided weakness.

 

MEDICATIONS:  07/17/2018 - Reviewed.

 

LABORATORY DATA:  07/17/2018 - White count 7.7, hemoglobin 8.2.  Sodium 140, potassium 4.6, chloride 
124, carbon dioxide 16, BUN 80, creatinine 3.37, glucose 267, calcium 9.0.

 

ASSESSMENT AND PLAN:  

1.  Status post cerebrovascular accident.  Continue supportive care.  The patient's mentation is some
what more depressed today.  

2.  Labile hypertension.  Still not in good control.  We will increase minoxidil from 5 to 10 mg per 
day.

3.  Chronic renal failure from hypertensive nephropathy.  Renal function stable.  As a matter of fact
, creatinine is slightly improved from 3.7 to a most recent value of 3.37 after volume repletion.  Ou
r plan is to continue the current IV fluid at 1/2 normal saline - but would decrease 125 mL per hour.


4.  Mild hypernatremia, stable and slowly improving.  Continue IV fluids - hypotonic solution.  

 

The patient has been scheduled for a PEG tube placement today.

## 2018-07-18 LAB
ANION GAP SERPL CALC-SCNC: 12 MMOL/L (ref 10–20)
BASOPHILS # BLD AUTO: 0 THOU/UL (ref 0–0.2)
BASOPHILS NFR BLD AUTO: 0.3 % (ref 0–1)
BUN SERPL-MCNC: 78 MG/DL (ref 9.8–20.1)
CALCIUM SERPL-MCNC: 8.7 MG/DL (ref 7.8–10.44)
CHLORIDE SERPL-SCNC: 121 MMOL/L (ref 98–107)
CO2 SERPL-SCNC: 15 MMOL/L (ref 23–31)
CREAT CL PREDICTED SERPL C-G-VRATE: 20 ML/MIN (ref 70–130)
EOSINOPHIL # BLD AUTO: 0 THOU/UL (ref 0–0.7)
EOSINOPHIL NFR BLD AUTO: 0.3 % (ref 0–10)
GLUCOSE SERPL-MCNC: 182 MG/DL (ref 83–110)
HGB BLD-MCNC: 8.1 G/DL (ref 12–16)
LYMPHOCYTES # BLD: 1.3 THOU/UL (ref 1.2–3.4)
LYMPHOCYTES NFR BLD AUTO: 13.6 % (ref 21–51)
MCH RBC QN AUTO: 28.6 PG (ref 27–31)
MCV RBC AUTO: 82.4 FL (ref 78–98)
MONOCYTES # BLD AUTO: 0.7 THOU/UL (ref 0.11–0.59)
MONOCYTES NFR BLD AUTO: 7.4 % (ref 0–10)
NEUTROPHILS # BLD AUTO: 7.7 THOU/UL (ref 1.4–6.5)
NEUTROPHILS NFR BLD AUTO: 78.4 % (ref 42–75)
PLATELET # BLD AUTO: 115 THOU/UL (ref 130–400)
POTASSIUM SERPL-SCNC: 4.5 MMOL/L (ref 3.5–5.1)
RBC # BLD AUTO: 2.85 MILL/UL (ref 4.2–5.4)
SODIUM SERPL-SCNC: 143 MMOL/L (ref 136–145)
WBC # BLD AUTO: 9.8 THOU/UL (ref 4.8–10.8)

## 2018-07-18 RX ADMIN — Medication SCH ML: at 09:55

## 2018-07-18 RX ADMIN — HEPARIN SODIUM SCH UNITS: 5000 INJECTION, SOLUTION INTRAVENOUS; SUBCUTANEOUS at 20:02

## 2018-07-18 RX ADMIN — INSULIN HUMAN PRN UNIT: 100 INJECTION, SOLUTION PARENTERAL at 05:22

## 2018-07-18 RX ADMIN — INSULIN HUMAN PRN UNIT: 100 INJECTION, SOLUTION PARENTERAL at 23:39

## 2018-07-18 RX ADMIN — Medication SCH ML: at 20:04

## 2018-07-18 RX ADMIN — INSULIN GLARGINE SCH MLS: 100 INJECTION, SOLUTION SUBCUTANEOUS at 09:52

## 2018-07-18 RX ADMIN — SODIUM BICARBONATE SCH MG: 325 TABLET ORAL at 09:45

## 2018-07-18 RX ADMIN — HEPARIN SODIUM SCH UNITS: 5000 INJECTION, SOLUTION INTRAVENOUS; SUBCUTANEOUS at 10:08

## 2018-07-18 RX ADMIN — INSULIN GLARGINE SCH MLS: 100 INJECTION, SOLUTION SUBCUTANEOUS at 20:03

## 2018-07-18 RX ADMIN — SCOPALAMINE SCH MG: 1 PATCH, EXTENDED RELEASE TRANSDERMAL at 15:43

## 2018-07-18 RX ADMIN — SODIUM BICARBONATE SCH MG: 325 TABLET ORAL at 20:01

## 2018-07-18 RX ADMIN — INSULIN HUMAN PRN UNIT: 100 INJECTION, SOLUTION PARENTERAL at 15:45

## 2018-07-18 RX ADMIN — INSULIN HUMAN PRN UNIT: 100 INJECTION, SOLUTION PARENTERAL at 00:33

## 2018-07-18 RX ADMIN — SODIUM BICARBONATE SCH MG: 325 TABLET ORAL at 15:43

## 2018-07-18 NOTE — PDOC.PN
- Subjective


Encounter Start Date: 07/18/18


Encounter Start Time: 08:00


Subjective: awakens to touch, not in distress, mostly snoring


-: per staff was talking a few words and sat with PT this am


-: needed max support to sit





- Objective


Resuscitation Status: 


 











Resuscitation Status           FULL:Full Resuscitation














MAR Reviewed: Yes


Vital Signs & Weight: 


 Vital Signs (12 hours)











  Temp Pulse Resp BP BP Pulse Ox


 


 07/18/18 09:45   84    


 


 07/18/18 09:43   84    


 


 07/18/18 07:56  98.7 F  84  16   183/89 H  98


 


 07/18/18 06:18      161/76 H 


 


 07/18/18 05:10   80   197/74 H  


 


 07/18/18 04:20  98.1 F  80  20   197/74 H  98


 


 07/18/18 00:00  98.5 F  80  20   166/79 H  99








 Weight











Admit Weight                   170 lb 6.677 oz


 


Weight                         177 lb 11.2 oz











 Most Recent Monitor Data











Heart Rate from ECG            80


 


NIBP                           163/62


 


NIBP BP-Mean                   133


 


Respiration from ECG           21


 


SpO2                           100














I&O: 


 











 07/17/18 07/18/18 07/19/18





 06:59 06:59 06:59


 


Intake Total 3841 170 


 


Balance 3841 170 











Result Diagrams: 


 07/17/18 05:56





 07/17/18 05:56


Additional Labs: 


 Accuchecks











  07/18/18 07/18/18 07/17/18





  05:18 00:27 20:30


 


POC Glucose  252 H  319 H  307 H














  07/17/18





  17:40


 


POC Glucose  212 H














Phys Exam





- Physical Examination


HEENT: PERRLA, sclera anicteric


Neck: no JVD, supple


Respiratory: no wheezing, no rales


Cardiovascular: RRR, no significant murmur


Gastrointestinal: soft, non-tender, positive bowel sounds


peg+


Musculoskeletal: pulses present, edema present


left hemiplegia, dysphagia





Dx/Plan


(1) Acute CVA (cerebrovascular accident)


Code(s): I63.9 - CEREBRAL INFARCTION, UNSPECIFIED   Status: Acute   Comment: 

Dense left hemiplegia with large right MCA cva   





(2) CKD (chronic kidney disease) stage 4, GFR 15-29 ml/min


Code(s): N18.4 - CHRONIC KIDNEY DISEASE, STAGE 4 (SEVERE)   Status: Chronic   





(3) Dysphagia


Code(s): R13.10 - DYSPHAGIA, UNSPECIFIED   Status: Acute   





(4) Hemiplegia affecting left nondominant side


Code(s): G81.94 - HEMIPLEGIA, UNSPECIFIED AFFECTING LEFT NONDOMINANT SIDE   

Status: Acute   Comment: PT/OT.  Will need placement.   





(5) Hypertensive urgency


Code(s): I16.0 - HYPERTENSIVE URGENCY   Status: Resolved   





(6) Diabetes


Code(s): E11.9 - TYPE 2 DIABETES MELLITUS WITHOUT COMPLICATIONS   Status: 

Chronic   


Qualifiers: 


   Diabetes mellitus type: type 2   Diabetes mellitus long term insulin use: 

without long term use   Diabetes mellitus complication status: with kidney 

complications   Diabetes mellitus complication detail: with chronic kidney 

disease   Chronic kidney disease stage: stage 4 (severe)   Qualified Code(s): 

E11.22 - Type 2 diabetes mellitus with diabetic chronic kidney disease; N18.4 - 

Chronic kidney disease, stage 4 (severe); N18.4 - Chronic kidney disease, stage 

4 (severe); N18.4 - Chronic kidney disease, stage 4 (severe); N18.4 - Chronic 

kidney disease, stage 4 (severe)   





(7) Hyperlipidemia


Code(s): E78.5 - HYPERLIPIDEMIA, UNSPECIFIED   Status: Chronic   


Qualifiers: 


   Hyperlipidemia type: unspecified   Qualified Code(s): E78.5 - Hyperlipidemia

, unspecified   


Comment: Resume home statin when possible.   





(8) Hypertension


Code(s): I10 - ESSENTIAL (PRIMARY) HYPERTENSION   Status: Chronic   





(9) NORA (acute kidney injury)


Code(s): N17.9 - ACUTE KIDNEY FAILURE, UNSPECIFIED   Status: Acute   





(10) Metabolic acidosis


Code(s): E87.2 - ACIDOSIS   Status: Acute   





(11) FTT (failure to thrive) in adult


Status: Acute   





(12) Acute encephalopathy


Code(s): G93.40 - ENCEPHALOPATHY, UNSPECIFIED   Status: Acute   Comment: sec to 

massive cva   





- Plan


peg feedings are initiated, gently increase volume as tolerated


-: needs free water via peg as tolerated


-: labs for today, still encephalopathic for the most part


-: a bit of progress with cognitive function, per staff responded with 1 word 


-: -answers this am, needs to mobilize more.





* .


Add lasix with iv fluids, has edema++.


Prognosis guarded


Has been accepted to [facility], await clinical response to peg feeding, 

trending of renal function.


Will d/w daughter later today


Is on multiple meds for htn, add hydralazine.





Review of Systems





- Medications/Allergies


Allergies/Adverse Reactions: 


 Allergies











Allergy/AdvReac Type Severity Reaction Status Date / Time


 


lisinopril [From Prinivil] Allergy   Verified 07/15/18 01:44











Medications: 


 Current Medications





Amlodipine Besylate (Norvasc)  [facility] PO [facility]ILY [facility]


   Last Admin: 07/18/18 09:43 Dose:  10 mg


Artificial Tears (Tears Naturale)  2 drop EA EYE PRN PRN


   PRN Reason: DRY EYES


   Last Admin: 07/16/18 08:23 Dose:  2 drop


Aspirin (Aspirin)  325 mg PER TUBE DAILY [facility]


   Last Admin: 07/18/18 09:46 Dose:  325 mg


Bisacodyl (Dulcolax)  10 mg AL NOW [facility]


   Stop: 07/18/18 13:00


   Last Admin: 07/18/18 10:48 Dose:  10 mg


Carvedilol (Coreg)  25 mg PO BID [facility]


   Last Admin: 07/18/18 09:45 Dose:  25 mg


Clopidogrel Bisulfate (Plavix)  75 mg PO DAILY [facility]


   Last Admin: 07/18/18 09:44 Dose:  75 mg


Dextrose/Water (Dextrose 50%)  25 gm SLOW IVP PRN PRN


   PRN Reason: Hypoglycemia


Furosemide (Lasix)  40 mg PO 0900,1400 [facility]


   Last Admin: 07/18/18 09:44 Dose:  40 mg


Glucagon (Glucagon)  1 mg IM PRN PRN


   PRN Reason: Hypoglycemia


Heparin Sodium (Porcine) (Heparin)  5,000 units SC BID [facility]


   Last Admin: 07/18/18 10:08 Dose:  5,000 units


Hydralazine HCl (Apresoline)  10 mg SLOW IVP Q4H PR[facility]


   PRN Reason: SBP>185


   Last Admin: 07/18/18 05:10 Dose:  10 mg


Hydralazine HCl (Apresoline)  25 mg PO TID [facility]


   Last Admin: 07/18/18 09:45 Dose:  25 mg


Dextrose/Water (D5w)  1,000 mls @ 0 mls/hr IV .Q0M PRN; As Directed


   PRN Reason: Hypoglycemia


Sodium Chloride (1/2 Normal Saline)  1,000 mls @ 125 mls/hr IV .Q8H [facility]


   Last Admin: 07/18/18 10:09 Dose:  1,000 mls


Insulin Glargine 10 units/ (Miscellaneous Medication)  0.1 mls @ 0 mls/hr SC 

BID [facility]


   Last Admin: 07/18/18 09:52 Dose:  0.1 mls


Insulin Human Regular (Humulin R)  0 units SC .AGGRESSIVE SLIDING PRN; Protocol


   PRN Reason: AGGRESSIVE SLIDING SCALE


   Last Admin: 07/18/18 05:22 Dose:  9 unit


Isosorbide Dinitrate (Isordil)  30 mg PER TUBE BID [facility]


Isosorbide Dinitrate (Isordil)  30 mg PER TUBE NOW [facility]


   Stop: 07/18/18 12:00


   Last Admin: 07/18/18 10:48 Dose:  30 mg


Isosorbide Mononitrate (Imdur)  60 mg PO DAILY [facility]


   Last Admin: 07/18/18 09:53 Dose:  Not Given


Minoxidil (Minoxidil)  10 mg PO DAILY [facility]


   Last Admin: 07/18/18 09:47 Dose:  10 mg


Ondansetron HCl (Zofran)  4 mg IVP Q6H PRN


   PRN Reason: Nausea/Vomiting


Pravastatin Sodium (Pravachol)  40 mg PO HS [facility]


   Last Admin: 07/17/18 20:38 Dose:  40 mg


Scopolamine (Transderm Scop)  1.5 mg TD Q3D [facility]


   Last Admin: 07/15/18 12:53 Dose:  1.5 mg


Sodium Bicarbonate (Bicarbonate, Sodium)  650 mg PO TID [facility]


   Last Admin: 07/18/18 09:45 Dose:  650 mg


Sodium Chloride (Flush - Normal Saline)  10 ml IVF Q12HR [facility]


   Last Admin: 07/18/18 09:55 Dose:  10 ml


Sodium Chloride (Flush - Normal Saline)  10 ml IVF PRN PRN


   PRN Reason: Saline Flush

## 2018-07-18 NOTE — PRG
DATE OF SERVICE:  07/18/2018

 

SUBJECTIVE:  The patient is status post PEG.  She is without complaints, seems to be comfortable.

 

OBJECTIVE:

VITAL SIGNS:  Temperature 97.9, pulse 81, respiratory rate 18, blood pressure 193/90.

CHEST:  Clear.

CARDIOVASCULAR:  Regular rate and rhythm.

ABDOMEN:  Protuberant, but nontender.  The PEG site looks good.  Bumpers were adjusted.

 

LABORATORY DATA:  Hemoglobin 8.1, hematocrit 23.8, platelet count 115.  Chemistries significant for C
O2 of 15, creatinine 3.27.

 

ASSESSMENT:

1.  Oropharyngeal dysphagia.

2.  Status post PEG placement.

 

RECOMMENDATIONS:

1.  Continue to check residuals.

2.  We will sign off.  Reconsult if any PEG problems.

## 2018-07-18 NOTE — PRG
DATE OF SERVICE:  07/18/2018

 

SUBJECTIVE:  Ms. Purdy is a 73-year-old black female with chronic renal failure from hypertensive 
nephropathy and being seen by the [facility] for her acute kidney injury on top of her chronic ulices
al failure.  She was admitted for a cerebrovascular accident with left hemiplegia.  Yesterday, she un
derwent an upper GI endoscopy with PEG tube placement.  This morning she is more arousable.  No new c
omplaints.

 

PHYSICAL EXAMINATION:

VITAL SIGNS:  Blood pressure 183/89, heart rate 84, respiratory rate 16, temperature 98.7, pulse ox 9
8%.

GENERAL:  Sleepy, but arousable and can follow simple commands, not in distress.

SKIN:  Adequate turgor.

HEENT:  Slightly pale conjunctivae, anicteric sclerae.

NECK:  No neck mass, no carotid bruits, no JVD.

CHEST:  No deformities.

LUNGS:  Clear breath sounds.  No wheezing, no crackles.

HEART:  Normal sinus rhythm.  No murmur, no gallops or rubs.

ABDOMEN:  Globular, soft, nontender, no masses.

EXTREMITIES:  No edema, no deformities.

NEUROLOGICAL:  Decreased mentation:  Left-sided weakness.

 

MEDICATIONS:  07/18/2018 - Reviewed.

 

LABORATORY DATA:  07/17/2018 - Hemoglobin 8.2.  Sodium 148, potassium 4.6, chloride 124, carbon dioxi
de 16, BUN 80, creatinine 3.37, glucose 267.

 

ASSESSMENT AND PLAN:  

1.  Hypertension - minoxidil was increased to 10 mg tab q.a.m. yesterday.

2.  Acute kidney injury/chronic renal failure - superimposed prerenal azotemia.  Much improved creati
nine with empiric volume repletion.

3.  Mild hypernatremia - slightly improved with half normal saline.  Would consider increasing free w
ater intake by PEG tube.

4.  Metabolic acidosis, currently on sodium bicarbonate 650 mg p.o. t.i.d.  

 

There is no indication for any dialytic intervention with this patient.  Continue supportive care.

 

Will recheck base met and CBC in the a.m.

## 2018-07-19 LAB
ANION GAP SERPL CALC-SCNC: 14 MMOL/L (ref 10–20)
BASOPHILS # BLD AUTO: 0 THOU/UL (ref 0–0.2)
BASOPHILS NFR BLD AUTO: 0.2 % (ref 0–1)
BUN SERPL-MCNC: 89 MG/DL (ref 9.8–20.1)
CALCIUM SERPL-MCNC: 8.7 MG/DL (ref 7.8–10.44)
CHLORIDE SERPL-SCNC: 118 MMOL/L (ref 98–107)
CO2 SERPL-SCNC: 13 MMOL/L (ref 23–31)
CREAT CL PREDICTED SERPL C-G-VRATE: 19 ML/MIN (ref 70–130)
EOSINOPHIL # BLD AUTO: 0 THOU/UL (ref 0–0.7)
EOSINOPHIL NFR BLD AUTO: 0.5 % (ref 0–10)
GLUCOSE SERPL-MCNC: 281 MG/DL (ref 83–110)
HGB BLD-MCNC: 7.3 G/DL (ref 12–16)
LYMPHOCYTES # BLD: 0.9 THOU/UL (ref 1.2–3.4)
LYMPHOCYTES NFR BLD AUTO: 13 % (ref 21–51)
MCH RBC QN AUTO: 28.4 PG (ref 27–31)
MCV RBC AUTO: 83.4 FL (ref 78–98)
MONOCYTES # BLD AUTO: 0.4 THOU/UL (ref 0.11–0.59)
MONOCYTES NFR BLD AUTO: 6 % (ref 0–10)
NEUTROPHILS # BLD AUTO: 5.9 THOU/UL (ref 1.4–6.5)
NEUTROPHILS NFR BLD AUTO: 80.3 % (ref 42–75)
PLATELET # BLD AUTO: 111 THOU/UL (ref 130–400)
POTASSIUM SERPL-SCNC: 4.3 MMOL/L (ref 3.5–5.1)
RBC # BLD AUTO: 2.59 MILL/UL (ref 4.2–5.4)
SODIUM SERPL-SCNC: 141 MMOL/L (ref 136–145)
WBC # BLD AUTO: 7.3 THOU/UL (ref 4.8–10.8)

## 2018-07-19 RX ADMIN — Medication SCH: at 09:42

## 2018-07-19 RX ADMIN — HEPARIN SODIUM SCH UNITS: 5000 INJECTION, SOLUTION INTRAVENOUS; SUBCUTANEOUS at 21:32

## 2018-07-19 RX ADMIN — INSULIN HUMAN PRN UNIT: 100 INJECTION, SOLUTION PARENTERAL at 12:33

## 2018-07-19 RX ADMIN — SODIUM BICARBONATE SCH MG: 325 TABLET ORAL at 15:04

## 2018-07-19 RX ADMIN — SODIUM BICARBONATE SCH MG: 325 TABLET ORAL at 21:34

## 2018-07-19 RX ADMIN — INSULIN HUMAN PRN UNIT: 100 INJECTION, SOLUTION PARENTERAL at 18:04

## 2018-07-19 RX ADMIN — Medication SCH ML: at 21:32

## 2018-07-19 RX ADMIN — INSULIN HUMAN PRN UNIT: 100 INJECTION, SOLUTION PARENTERAL at 04:44

## 2018-07-19 RX ADMIN — HEPARIN SODIUM SCH: 5000 INJECTION, SOLUTION INTRAVENOUS; SUBCUTANEOUS at 09:41

## 2018-07-19 RX ADMIN — INSULIN GLARGINE SCH MLS: 100 INJECTION, SOLUTION SUBCUTANEOUS at 10:48

## 2018-07-19 RX ADMIN — SODIUM BICARBONATE SCH MG: 325 TABLET ORAL at 09:37

## 2018-07-19 RX ADMIN — INSULIN GLARGINE SCH MLS: 100 INJECTION, SOLUTION SUBCUTANEOUS at 21:32

## 2018-07-19 NOTE — PDOC.PN
- Subjective


Encounter Start Date: 07/19/18


Encounter Start Time: 09:00


Subjective: awakens to touch, not oriented, does not follow verbal stimuli





- Objective


Resuscitation Status: 


 











Resuscitation Status           FULL:Full Resuscitation














MAR Reviewed: Yes


Vital Signs & Weight: 


 Vital Signs (12 hours)











  Temp Pulse Resp BP BP Pulse Ox


 


 07/19/18 09:37   92   165/71 H  


 


 07/19/18 09:35   92   165/71 H  


 


 07/19/18 08:00  99.3 F  95  22 H   165/71 H  94 L


 


 07/19/18 04:35  97.9 F  92  16   168/72 H  97








 Weight











Admit Weight                   170 lb 6.677 oz


 


Weight                         185 lb











 Most Recent Monitor Data











Heart Rate from ECG            80


 


NIBP                           163/62


 


NIBP BP-Mean                   133


 


Respiration from ECG           21


 


SpO2                           100














I&O: 


 











 07/18/18 07/19/18 07/20/18





 06:59 06:59 06:59


 


Intake Total 170 1550 


 


Balance 170 1550 











Result Diagrams: 


 07/19/18 04:49





 07/19/18 04:49


Additional Labs: 


 Accuchecks











  07/19/18 07/18/18 07/18/18





  04:45 23:28 19:55


 


POC Glucose  279 H  222 H  225 H














  07/18/18





  17:09


 


POC Glucose  206 H














Phys Exam





- Physical Examination


HEENT: PERRLA, sclera anicteric


Neck: no JVD, supple


Respiratory: no wheezing, no rales


rhonchi+


Cardiovascular: RRR, no significant murmur


Gastrointestinal: soft, non-tender, positive bowel sounds


peg+


Musculoskeletal: no edema, pulses present


left hemiplegia, dysphagia, encephalopathy





Dx/Plan


(1) Acute CVA (cerebrovascular accident)


Code(s): I63.9 - CEREBRAL INFARCTION, UNSPECIFIED   Status: Acute   Comment: 

Dense left hemiplegia with large right MCA cva   





(2) CKD (chronic kidney disease) stage 4, GFR 15-29 ml/min


Code(s): N18.4 - CHRONIC KIDNEY DISEASE, STAGE 4 (SEVERE)   Status: Chronic   





(3) Dysphagia


Code(s): R13.10 - DYSPHAGIA, UNSPECIFIED   Status: Acute   





(4) Hemiplegia affecting left nondominant side


Code(s): G81.94 - HEMIPLEGIA, UNSPECIFIED AFFECTING LEFT NONDOMINANT SIDE   

Status: Acute   Comment: PT/OT.  Will need placement.   





(5) Hypertensive urgency


Code(s): I16.0 - HYPERTENSIVE URGENCY   Status: Resolved   





(6) Diabetes


Code(s): E11.9 - TYPE 2 DIABETES MELLITUS WITHOUT COMPLICATIONS   Status: 

Chronic   


Qualifiers: 


   Diabetes mellitus type: type 2   Diabetes mellitus long term insulin use: 

without long term use   Diabetes mellitus complication status: with kidney 

complications   Diabetes mellitus complication detail: with chronic kidney 

disease   Chronic kidney disease stage: stage 4 (severe)   Qualified Code(s): 

E11.22 - Type 2 diabetes mellitus with diabetic chronic kidney disease; N18.4 - 

Chronic kidney disease, stage 4 (severe); N18.4 - Chronic kidney disease, stage 

4 (severe); N18.4 - Chronic kidney disease, stage 4 (severe); N18.4 - Chronic 

kidney disease, stage 4 (severe)   





(7) Hyperlipidemia


Code(s): E78.5 - HYPERLIPIDEMIA, UNSPECIFIED   Status: Chronic   


Qualifiers: 


   Hyperlipidemia type: unspecified   Qualified Code(s): E78.5 - Hyperlipidemia

, unspecified   


Comment: Resume home statin when possible.   





(8) Hypertension


Code(s): I10 - ESSENTIAL (PRIMARY) HYPERTENSION   Status: Chronic   





(9) NORA (acute kidney injury)


Code(s): N17.9 - ACUTE KIDNEY FAILURE, UNSPECIFIED   Status: Acute   





(10) Metabolic acidosis


Code(s): E87.2 - ACIDOSIS   Status: Acute   





(11) FTT (failure to thrive) in adult


Status: Acute   





(12) Acute encephalopathy


Code(s): G93.40 - ENCEPHALOPATHY, UNSPECIFIED   Status: Acute   Comment: sec to 

massive cva   





- Plan


d/w  daughter and poa for patient


-: plan for dc to [facility] in am


-: d/w , needs weekly bmp at [facility] to be faxed to his office


-: tolerating peg feeding, cognitively not much improvement


-: prognosis is guarded and family is aware





* .


htn is fairly well controlled, oral lasix


dm is labile, increase lantus and add glipizide.


Revisited code status with daughter, wants her to be full code for now.





Review of Systems





- Medications/Allergies


Allergies/Adverse Reactions: 


 Allergies











Allergy/AdvReac Type Severity Reaction Status Date / Time


 


lisinopril [From Prinivil] Allergy   Verified 07/15/18 01:44











Medications: 


 Current Medications





Amlodipine Besylate (Norvasc)  10 mg PO DAILY [facility]


   Last Admin: 07/19/18 09:35 Dose:  10 mg


Artificial Tears (Tears Naturale)  2 drop EA EYE PRN PRN


   PRN Reason: DRY EYES


   Last Admin: 07/16/18 08:23 Dose:  2 drop


Aspirin (Aspirin)  325 mg PER TUBE DAILY [facility]


   Last Admin: 07/19/18 09:39 Dose:  325 mg


Carvedilol (Coreg)  25 mg PO BID [facility]


   Last Admin: 07/19/18 09:39 Dose:  25 mg


Clonidine (Catapres)  0.2 mg PO Q8HR [facility]


Clopidogrel Bisulfate (Plavix)  75 mg PO DAILY [facility]


   Last Admin: 07/19/18 09:39 Dose:  75 mg


Dextrose/Water (Dextrose 50%)  25 gm SLOW IVP PRN PRN


   PRN Reason: Hypoglycemia


Epoetin Conner (Procrit)  7,500 units SC Q7D@0900 [facility]


   Last Admin: 07/19/18 10:18 Dose:  7,500 units


Furosemide (Lasix)  40 mg PO DAILY[facility]


Glipizide (Glucotrol)  5 mg PO BID [facility]


   Last Admin: 07/19/18 09:40 Dose:  5 mg


Glucagon (Glucagon)  1 mg IM PRN PRN


   PRN Reason: Hypoglycemia


Heparin Sodium (Porcine) (Heparin)  5,000 units SC BID [facility]


   Last Admin: 07/19/18 09:41 Dose:  Not Given


Hydralazine HCl (Apresoline)  10 mg SLOW IVP Q4H PRN


   PRN Reason: SBP>185


   Last Admin: 07/18/18 05:10 Dose:  10 mg


Hydralazine HCl (Apresoline)  25 mg PO TID [facility]


   Last Admin: 07/19/18 09:37 Dose:  25 mg


Dextrose/Water (D5w)  1,000 mls @ 0 mls/hr IV .Q0M PRN; As Directed


   PRN Reason: Hypoglycemia


Sodium Chloride (1/2 Normal Saline)  1,000 mls @ 125 mls/hr IV .Q8H [facility]


   Last Admin: 07/19/18 10:56 Dose:  Not Given


Insulin Glargine 15 units/ (Miscellaneous Medication)  0.15 mls @ 0 mls/hr SC 

BID [facility]


   Last Admin: 07/19/18 10:48 Dose:  0.15 mls


Insulin Human Regular (Humulin R)  0 units SC .AGGRESSIVE SLIDING PRN; Protocol


   PRN Reason: AGGRESSIVE SLIDING SCALE


   Last Admin: 07/19/18 04:44 Dose:  9 unit


Isosorbide Dinitrate (Isordil)  30 mg PER TUBE BID [facility]


   Last Admin: 07/19/18 09:36 Dose:  30 mg


Minoxidil (Minoxidil)  10 mg PO DAILY [facility]


   Last Admin: 07/19/18 09:38 Dose:  10 mg


Ondansetron HCl (Zofran)  4 mg IVP Q6H PRN


   PRN Reason: Nausea/Vomiting


Pravastatin Sodium (Pravachol)  40 mg PO HS [facility]


   Last Admin: 07/18/18 20:02 Dose:  40 mg


Scopolamine (Transderm Scop)  1.5 mg TD Q3D [facility]


   Last Admin: 07/18/18 15:43 Dose:  1.5 mg


Sodium Bicarbonate (Bicarbonate, Sodium)  650 mg PO TID [facility]


   Last Admin: 07/19/18 09:37 Dose:  650 mg


Sodium Chloride (Flush - Normal Saline)  10 ml IVF Q12HR [facility]


   Last Admin: 07/19/18 09:42 Dose:  Not Given


Sodium Chloride (Flush - Normal Saline)  10 ml IVF PRN PRN


   PRN Reason: Saline Flush

## 2018-07-19 NOTE — PRG
DATE OF SERVICE:  07/19/2018

 

Mrs. Purdy is a 73-year-old black female followed up for  her acute kidney injury on top of her ch
ronic renal failure.  She was initially admitted for a CVA with left-sided hemiplegia.  This morning 
she still has decreased mentation, but she is arousable and can follow my simple commands.  No acute 
event noted last night.  She is currently on a diuretic regimen.

 

PHYSICAL EXAMINATION: 

VITAL SIGNS:  Blood pressure is noted at 165/71, heart rate 95, respiratory rate 22, temperature 99.3
, pulse ox 94%.

GENERAL: The patient has decreased mentation, but arousable and can follow simple commands.

SKIN:  Adequate turgor. 

HEENT:  She has slightly pale conjunctivae, anicteric sclerae.

NECK:  No neck mass, no carotid bruits, no JVD.

CHEST:  No deformities.

LUNGS:  Decreased breath sounds.

HEART:  Normal sinus rhythm.  No murmur, no gallops, no rubs.

ABDOMEN:  Globular, soft, nontender, no masses.

EXTREMITIES:  Trace edema.

 

MEDICATIONS:  07/19/2018 - Reviewed.

 

LABORATORIES:  07/19/2018 - White count 7.3, hemoglobin 7.3, hematocrit 21.6.  Sodium 141, potassium 
4.3, chloride 118, carbon dioxide 13, BUN is 89, creatinine 3.48, GFR 16 mL per minute.  Calcium 8.7.


 

ASSESSMENT:

1.  Status post cerebrovascular accident with left hemiplegia.  Continue supportive care.  PEG tube h
as already been placed with this patient.

2.  Acute kidney injury/chronic renal failure, fluctuating creatinine.  The slightly higher creatinin
e this a.m. may be a reflection of the current diuretic regimen.  My bias is to adjust the furosemide
 downwards.  There is no indication for any dialytic intervention with this patient.

3.  Hypertension.  Continue current blood pressure meds.  Please note I recently increased her minoxi
dil to 10 mg tab once a day.  The blood pressure is slightly improved with this maneuver.

4.  Anemia - will start Epogen 7500 units subcu every week.

## 2018-07-20 RX ADMIN — SODIUM BICARBONATE SCH MG: 325 TABLET ORAL at 15:55

## 2018-07-20 RX ADMIN — Medication SCH ML: at 22:03

## 2018-07-20 RX ADMIN — INSULIN GLARGINE SCH: 100 INJECTION, SOLUTION SUBCUTANEOUS at 22:24

## 2018-07-20 RX ADMIN — SODIUM BICARBONATE SCH MG: 325 TABLET ORAL at 21:47

## 2018-07-20 RX ADMIN — SODIUM PHOSPHATE SCH ML: 7; 19 ENEMA RECTAL at 18:24

## 2018-07-20 RX ADMIN — INSULIN GLARGINE SCH MLS: 100 INJECTION, SOLUTION SUBCUTANEOUS at 10:42

## 2018-07-20 RX ADMIN — INSULIN HUMAN PRN UNIT: 100 INJECTION, SOLUTION PARENTERAL at 12:42

## 2018-07-20 RX ADMIN — HEPARIN SODIUM SCH UNITS: 5000 INJECTION, SOLUTION INTRAVENOUS; SUBCUTANEOUS at 21:53

## 2018-07-20 RX ADMIN — SODIUM PHOSPHATE SCH ML: 7; 19 ENEMA RECTAL at 15:55

## 2018-07-20 RX ADMIN — Medication SCH: at 10:44

## 2018-07-20 RX ADMIN — SODIUM BICARBONATE SCH MG: 325 TABLET ORAL at 10:09

## 2018-07-20 RX ADMIN — INSULIN HUMAN PRN UNIT: 100 INJECTION, SOLUTION PARENTERAL at 05:10

## 2018-07-20 RX ADMIN — HEPARIN SODIUM SCH UNITS: 5000 INJECTION, SOLUTION INTRAVENOUS; SUBCUTANEOUS at 10:08

## 2018-07-20 RX ADMIN — INSULIN HUMAN PRN UNIT: 100 INJECTION, SOLUTION PARENTERAL at 18:51

## 2018-07-20 NOTE — PRG
DATE OF SERVICE:  07/20/2018

 

SUBJECTIVE:  Ms. Purdy is a 73-year-old black female with chronic renal failure from hypertensive 
nephropathy and being followed by the Renal Service.  She was initially admitted with cerebrovascular
 accident with left hemiplegia.  In the interim, she had a PEG tube placed.  She is noted to be impro
ving mentation wise.  This morning, she is more awake.  She was responsive to my verbal stimuli.  She
 can follow commands and she can answer appropriately.  No acute events noted except for some abdomin
al distention.  Please note she had a PEG tube placed recently.  No complaints of chest pain, shortne
ss of breath today.

 

OBJECTIVE:

VITAL SIGNS:  Blood pressure 142/59, heart rate 78, respiratory rate 20, temperature 98.6, pulse ox 9
4%.

GENERAL:  Noted to be awake, alert, comfortable, not in distress.

SKIN:  Adequate turgor.

HEENT:  Pinkish conjunctivae, anicteric sclerae.

NECK:  No neck mass, no carotid bruits, no JVD.

CHEST:  No deformities.

LUNGS:  Clear breath sounds.

HEART:  Normal sinus rhythm.  No murmur, no gallops, no rubs.

ABDOMEN:  Globular, soft, nontender, no masses.

EXTREMITIES:  No edema, no deformities.  Please note she has a PEG tube noted.

 

MEDICATIONS:  Of 07/20/2018 was reviewed.

 

LABORATORY DATA:  Of 07/20/2018 pending.  Laboratories of 07/19/2018, sodium 141, potassium 4.3, chlo
ride 118, carbon dioxide 13, BUN 89, creatinine 3.48.  White count 7.3, hemoglobin 7.3.

 

ASSESSMENT AND PLAN:

1.  Chronic renal failure from hypertensive nephropathy, stable - most recent creatinine was noted at
 3.48, which is near baseline.  She is currently on a diuretic regimen.  This has been adjusted downw
ards.  If renal function further worsens, we may need to hold off the diuretics.  There is no indicat
ion for any dialytic intervention.

2.  Hypertension, much improved with minoxidil.

3.  Anemia - patient currently has been started on Epogen.

4.  Status post cerebrovascular accident.  Continue supportive care.

 

We will recheck base met and CBC in a.m.

## 2018-07-20 NOTE — PDOC.PN
- Subjective


Encounter Start Date: 07/20/18


Encounter Start Time: 09:15


Subjective: not awake or oriented


-: lethargic, moves right UE


-: has some residuals per staff on peg feeding





- Objective


Resuscitation Status: 


 











Resuscitation Status           FULL:Full Resuscitation














MAR Reviewed: Yes


Vital Signs & Weight: 


 Vital Signs (12 hours)











  Temp Pulse Resp BP BP Pulse Ox


 


 07/20/18 11:54  98.7 F  88  16   128/56 L  92 L


 


 07/20/18 10:17   89    


 


 07/20/18 10:12     142/59 H  


 


 07/20/18 10:11   89    


 


 07/20/18 07:59  99.4 F  89  18    95


 


 07/20/18 05:09     142/59 H  


 


 07/20/18 03:46  98.6 F  78  20   142/59 H  94 L








 Weight











Admit Weight                   170 lb 6.677 oz


 


Weight                         187 lb 6.4 oz











 Most Recent Monitor Data











Heart Rate from ECG            80


 


NIBP                           163/62


 


NIBP BP-Mean                   133


 


Respiration from ECG           21


 


SpO2                           100














I&O: 


 











 07/19/18 07/20/18 07/21/18





 06:59 06:59 06:59


 


Intake Total [phone]


 


Balance 4485 139 2409











Result Diagrams: 


 07/19/18 04:49





 07/19/18 04:49


Additional Labs: 


 Accuchecks











  07/20/18 07/20/18 07/19/18





  12:20 05:12 23:43


 


POC Glucose  317 H  245 H  308 H














  07/19/18 07/19/18





  20:25 17:50


 


POC Glucose  335 H  311 H














Phys Exam





- Physical Examination


HEENT: PERRLA, sclera anicteric


Neck: no nodes, no JVD


Respiratory: no wheezing, no rales


rhonchi+


Cardiovascular: RRR, no significant murmur


Gastrointestinal: soft, non-tender, positive bowel sounds


peg+


Musculoskeletal: no edema, pulses present


left hemiplegia, dysphagia


encephalopathic





Dx/Plan


(1) Acute CVA (cerebrovascular accident)


Code(s): I63.9 - CEREBRAL INFARCTION, UNSPECIFIED   Status: Acute   Comment: 

Dense left hemiplegia with large right MCA cva   





(2) CKD (chronic kidney disease) stage 4, GFR 15-29 ml/min


Code(s): N18.4 - CHRONIC KIDNEY DISEASE, STAGE 4 (SEVERE)   Status: Chronic   





(3) Dysphagia


Code(s): R13.10 - DYSPHAGIA, UNSPECIFIED   Status: Acute   





(4) Hemiplegia affecting left nondominant side


Code(s): G81.94 - HEMIPLEGIA, UNSPECIFIED AFFECTING LEFT NONDOMINANT SIDE   

Status: Acute   Comment: PT/OT.  Will need placement.   





(5) Hypertensive urgency


Code(s): I16.0 - HYPERTENSIVE URGENCY   Status: Resolved   





(6) Diabetes


Code(s): E11.9 - TYPE 2 DIABETES MELLITUS WITHOUT COMPLICATIONS   Status: 

Chronic   


Qualifiers: 


   Diabetes mellitus type: type 2   Diabetes mellitus long term insulin use: 

without long term use   Diabetes mellitus complication status: with kidney 

complications   Diabetes mellitus complication detail: with chronic kidney 

disease   Chronic kidney disease stage: stage 4 (severe)   Qualified Code(s): 

E11.22 - Type 2 diabetes mellitus with diabetic chronic kidney disease; N18.4 - 

Chronic kidney disease, stage 4 (severe); N18.4 - Chronic kidney disease, stage 

4 (severe); N18.4 - Chronic kidney disease, stage 4 (severe); N18.4 - Chronic 

kidney disease, stage 4 (severe)   





(7) Hyperlipidemia


Code(s): E78.5 - HYPERLIPIDEMIA, UNSPECIFIED   Status: Chronic   


Qualifiers: 


   Hyperlipidemia type: unspecified   Qualified Code(s): E78.5 - Hyperlipidemia

, unspecified   


Comment: Resume home statin when possible.   





(8) Hypertension


Code(s): I10 - ESSENTIAL (PRIMARY) HYPERTENSION   Status: Chronic   


Qualifiers: 


   Hypertension type: essential hypertension   Qualified Code(s): I10 - 

Essential (primary) hypertension   





(9) NORA (acute kidney injury)


Code(s): N17.9 - ACUTE KIDNEY FAILURE, UNSPECIFIED   Status: Acute   





(10) Metabolic acidosis


Code(s): E87.2 - ACIDOSIS   Status: Acute   





(11) FTT (failure to thrive) in adult


Status: Acute   





(12) Acute encephalopathy


Code(s): G93.40 - ENCEPHALOPATHY, UNSPECIFIED   Status: Acute   Comment: sec to 

massive cva   





- Plan


neurologically no sig improvement, still encephalopathic


-: poor prognosis, family wants her to be full code


-: fleets until clear, reglan bid


-: htn is stable, may dc to snf if passing stool and tolerating peg feeds


-: weekly renal function panel to see trend





* .








Review of Systems





- Medications/Allergies


Allergies/Adverse Reactions: 


 Allergies











Allergy/AdvReac Type Severity Reaction Status Date / Time


 


lisinopril [From Prinivil] Allergy   Verified 07/15/18 01:44











Medications: 


 Current Medications





Amlodipine Besylate (Norvasc)  10 mg PO DAILY [facility]


   Last Admin: 07/20/18 10:17 Dose:  10 mg


Artificial Tears (Tears Naturale)  2 drop EA EYE PRN PRN


   PRN Reason: DRY EYES


   Last Admin: 07/16/18 08:23 Dose:  2 drop


Aspirin (Aspirin)  325 mg PER TUBE DAILY [facility]


   Last Admin: 07/20/18 10:09 Dose:  325 mg


Carvedilol (Coreg)  25 mg PO BID [facility]


   Last Admin: 07/20/18 10:10 Dose:  25 mg


Clonidine (Catapres)  0.2 mg PO TID [facility]


   Last Admin: 07/20/18 10:12 Dose:  0.2 mg


Clopidogrel Bisulfate (Plavix)  75 mg PO DAILY [facility]


   Last Admin: 07/20/18 10:11 Dose:  75 mg


Dextrose/Water (Dextrose 50%)  25 gm SLOW IVP PRN PRN


   PRN Reason: Hypoglycemia


Epoetin Conner (Procrit)  7,500 units SC Q7D@0900 [facility]


   Last Admin: 07/19/18 10:18 Dose:  7,500 units


Furosemide (Lasix)  40 mg PO DAILY-[facility]


   Last Admin: 07/20/18 10:17 Dose:  40 mg


Glipizide (Glucotrol)  5 mg PO BID [facility]


   Last Admin: 07/20/18 10:10 Dose:  5 mg


Glucagon (Glucagon)  1 mg IM PRN PRN


   PRN Reason: Hypoglycemia


Heparin Sodium (Porcine) (Heparin)  5,000 units SC BID [facility]


   Last Admin: 07/20/18 10:08 Dose:  5,000 units


Hydralazine HCl (Apresoline)  10 mg SLOW IVP Q4H PRN


   PRN Reason: SBP>185


   Last Admin: 07/18/18 05:10 Dose:  10 mg


Hydralazine HCl (Apresoline)  25 mg PO TID [facility]


   Last Admin: 07/20/18 10:11 Dose:  25 mg


Dextrose/Water (D5w)  1,000 mls @ 0 mls/hr IV .Q0M PRN; As Directed


   PRN Reason: Hypoglycemia


Insulin Glargine 25 units/ (Miscellaneous Medication)  0.25 mls @ 0 mls/hr SC 

BID [facility]


   Last Admin: 07/20/18 10:42 Dose:  0.25 mls


Insulin Human Regular (Humulin R)  0 units SC .AGGRESSIVE SLIDING PRN; Protocol


   PRN Reason: AGGRESSIVE SLIDING SCALE


   Last Admin: 07/20/18 12:42 Dose:  11 unit


Isosorbide Dinitrate (Isordil)  30 mg PER TUBE BID [facility]


   Last Admin: 07/20/18 10:10 Dose:  30 mg


Metoclopramide HCl (Reglan)  10 mg IVP Q12HR [facility]


   Last Admin: 07/20/18 10:08 Dose:  10 mg


Minoxidil (Minoxidil)  10 mg PO DAILY [facility]


   Last Admin: 07/20/18 10:10 Dose:  10 mg


Ondansetron HCl (Zofran)  4 mg IVP Q6H PRN


   PRN Reason: Nausea/Vomiting


Pravastatin Sodium (Pravachol)  40 mg PO HS [facility]


   Last Admin: 07/19/18 21:34 Dose:  40 mg


Scopolamine (Transderm Scop)  1.5 mg TD Q3D [facility]


   Last Admin: 07/18/18 15:43 Dose:  1.5 mg


Sodium Bicarbonate (Bicarbonate, Sodium)  650 mg PO TID [facility]


   Last Admin: 07/20/18 10:09 Dose:  650 mg


Sodium Chloride (Flush - Normal Saline)  10 ml IVF Q12HR [facility]


   Last Admin: 07/20/18 10:44 Dose:  Not Given


Sodium Chloride (Flush - Normal Saline)  10 ml IVF PRN PRN


   PRN Reason: Saline Flush

## 2018-07-21 LAB
ANION GAP SERPL CALC-SCNC: 14 MMOL/L (ref 10–20)
BUN SERPL-MCNC: 101 MG/DL (ref 9.8–20.1)
CALCIUM SERPL-MCNC: 8.4 MG/DL (ref 7.8–10.44)
CHLORIDE SERPL-SCNC: 118 MMOL/L (ref 98–107)
CO2 SERPL-SCNC: 14 MMOL/L (ref 23–31)
CREAT CL PREDICTED SERPL C-G-VRATE: 18 ML/MIN (ref 70–130)
GLUCOSE SERPL-MCNC: 86 MG/DL (ref 83–110)
HGB BLD-MCNC: 7 G/DL (ref 12–16)
MCH RBC QN AUTO: 29.3 PG (ref 27–31)
MCV RBC AUTO: 83.6 FL (ref 78–98)
MDIFF COMPLETE?: YES
PLATELET # BLD AUTO: 113 THOU/UL (ref 130–400)
POTASSIUM SERPL-SCNC: 4.1 MMOL/L (ref 3.5–5.1)
RBC # BLD AUTO: 2.38 MILL/UL (ref 4.2–5.4)
SODIUM SERPL-SCNC: 142 MMOL/L (ref 136–145)
WBC # BLD AUTO: 10.5 THOU/UL (ref 4.8–10.8)

## 2018-07-21 RX ADMIN — INSULIN HUMAN PRN UNIT: 100 INJECTION, SOLUTION PARENTERAL at 17:33

## 2018-07-21 RX ADMIN — SODIUM PHOSPHATE SCH ML: 7; 19 ENEMA RECTAL at 07:43

## 2018-07-21 RX ADMIN — SODIUM PHOSPHATE SCH ML: 7; 19 ENEMA RECTAL at 00:21

## 2018-07-21 RX ADMIN — Medication SCH: at 21:56

## 2018-07-21 RX ADMIN — INSULIN GLARGINE SCH: 100 INJECTION, SOLUTION SUBCUTANEOUS at 11:55

## 2018-07-21 RX ADMIN — SODIUM BICARBONATE SCH MG: 325 TABLET ORAL at 15:37

## 2018-07-21 RX ADMIN — SCOPALAMINE SCH MG: 1 PATCH, EXTENDED RELEASE TRANSDERMAL at 12:18

## 2018-07-21 RX ADMIN — SODIUM BICARBONATE SCH MG: 325 TABLET ORAL at 09:51

## 2018-07-21 RX ADMIN — Medication SCH ML: at 09:55

## 2018-07-21 RX ADMIN — HEPARIN SODIUM SCH UNITS: 5000 INJECTION, SOLUTION INTRAVENOUS; SUBCUTANEOUS at 21:54

## 2018-07-21 RX ADMIN — SODIUM BICARBONATE SCH MG: 325 TABLET ORAL at 21:50

## 2018-07-21 RX ADMIN — HEPARIN SODIUM SCH UNITS: 5000 INJECTION, SOLUTION INTRAVENOUS; SUBCUTANEOUS at 09:56

## 2018-07-21 NOTE — RAD
FRONTAL VIEW ABDOMEN/KUB:

 

COMPARISON: 

7/14/18.

 

FINDINGS: 

There is a large calcification of the left aspect of the pelvis and additional smaller calcific densi
ties overlying the pelvis.  Left side ostomy is noted with air insufflated balloon at the left medial
 abdomen.  There is moderate retained fecal material of the colon.  Imaged upper abdomen reveals a pa
ucity of bowel gas.  Portions of upper abdomen are excluded from view.

 

Mild chronic osseous deformity is present at the inferior pelvis.

 

IMPRESSION: 

1.  Calcific densities overlying the pelvis.  The component could relate to calcified uterine fibroid
 disease, although findings are not further delineated on the basis of this exam.

 

2.  Nonspecific bowel gas pattern.

 

POS: [facility]

## 2018-07-21 NOTE — PRG
DATE OF SERVICE:  07/21/2018

 

RENAL MEDICINE

 

SUBJECTIVE:  Ms. Purdy is a 73-year-old black female who was admitted for CVA with left hemiplegia
.  In the interim, she has received a PEG tube placement.  We are following her up for acute kidney i
njury on top of chronic renal failure.  She was being diuresed recently and the diuretics have been a
djusted downwards.  However, I noted BUN was elevated more than 100 today.  For this reason, we will 
stop the furosemide and start her on normal saline.  She voices no new complaints.  She is tolerating
 the PEG tube feeding.  No complaints of chest pain or shortness of breath.  Mentation is improving o
sharona time.

 

PHYSICAL EXAMINATION:

VITAL SIGNS:  Blood pressure is 176/50 with heart rate of 85, respiratory rate 24, temperature 99, pu
lse ox 94% room air.

GENERAL:  Patient is awake, can follow commands.  She is verbal.  She is not in distress.

SKIN:  Adequate turgor.

HEENT:  Slightly pale conjunctivae, anicteric sclerae.

NECK:  No neck mass, no carotid bruits, no JVD.

CHEST:  No deformities.

LUNGS:  Decreased breath sounds.

HEART:  Normal sinus rhythm.  No murmurs, no gallops, no rubs.

ABDOMEN:  Globular, soft, nontender, no masses.  Positive for PEG tube.

EXTREMITIES:  No edema, no deformities.

 

MEDICATIONS:  Medications of 07/21/2018 reviewed.

 

LABORATORY DATA:  Laboratories of 07/21/2018, white count 10.5, hemoglobin 7.  Sodium 142, potassium 
4.1, chloride 118, carbon dioxide 14, , creatinine 3.82, calcium 8.4.

 

ASSESSMENT AND PLAN:

1.  Acute kidney injury on top of her chronic renal failure, worsening renal dysfunction.  We will di
scontinue Lasix with this patient and start normal saline 100 mL per hour.

2.  Metabolic acidosis.  Continuing current sodium bicarbonate tabs.

3.  Anemia, on weekly Epogen.  P.r.n. blood transfusion.  Consider blood transfusion if hemoglobin is
 less than 7.

4.  Status post cerebrovascular accident with left hemiplegia.  Continue supportive care.  Her mentat
ion has been much improved in the last 2 days.  She is awake and can follow commands.  She is verbal.


 

We will recheck base met and CBC in a.m.

## 2018-07-21 NOTE — PDOC.PN
- Subjective


Encounter Start Date: 07/21/18


Encounter Start Time: 09:00


Subjective: is more awake this morning and folllows verbal stimuli


-: is moving her right extremities


-: has not had bm from 4 days, no relief with 4 fleets so far





- Objective


Resuscitation Status: 


 











Resuscitation Status           FULL:Full Resuscitation














MAR Reviewed: Yes


Vital Signs & Weight: 


 Vital Signs (12 hours)











  Temp Pulse Resp BP BP Pulse Ox


 


 07/21/18 11:58  99 F  86  24 H    94 L


 


 07/21/18 09:53   85    


 


 07/21/18 09:52   85   176/50 H  


 


 07/21/18 08:55  99.0 F  85  24 H    94 L


 


 07/21/18 07:51  99 F  85  24 H    94 L


 


 07/21/18 04:00  98.8 F  81  18   145/54 H  94 L








 Weight











Admit Weight                   170 lb 6.677 oz


 


Weight                         187 lb 6.4 oz











 Most Recent Monitor Data











Heart Rate from ECG            80


 


NIBP                           163/62


 


NIBP BP-Mean                   133


 


Respiration from ECG           21


 


SpO2                           100














I&O: 


 











 07/20/18 07/21/18 07/22/18





 06:59 06:59 06:59


 


Intake Total 537 1791 125


 


Balance 537 1791 125











Result Diagrams: 


 07/21/18 04:40





 07/21/18 04:40


Additional Labs: 


 Accuchecks











  07/21/18 07/21/18 07/20/18





  04:42 00:16 22:00


 


POC Glucose  93  141 H  172 H














  07/20/18 07/20/18





  18:37 12:20


 


POC Glucose  251 H  317 H














Phys Exam





- Physical Examination


HEENT: PERRLA, sclera anicteric


Neck: no JVD, supple


Respiratory: no wheezing, no rales


Cardiovascular: RRR, no significant murmur


Gastrointestinal: soft, non-tender, positive bowel sounds


peg+


Musculoskeletal: pulses present, edema present


left hemiplegia, dysphagia





Dx/Plan


(1) Acute CVA (cerebrovascular accident)


Code(s): I63.9 - CEREBRAL INFARCTION, UNSPECIFIED   Status: Acute   Comment: 

Dense left hemiplegia with large right MCA cva   





(2) CKD (chronic kidney disease) stage 4, GFR 15-29 ml/min


Code(s): N18.4 - CHRONIC KIDNEY DISEASE, STAGE 4 (SEVERE)   Status: Chronic   





(3) Dysphagia


Code(s): R13.10 - DYSPHAGIA, UNSPECIFIED   Status: Acute   





(4) Hemiplegia affecting left nondominant side


Code(s): G81.94 - HEMIPLEGIA, UNSPECIFIED AFFECTING LEFT NONDOMINANT SIDE   

Status: Acute   Comment: PT/OT.  Will need placement.   





(5) Hypertensive urgency


Code(s): I16.0 - HYPERTENSIVE URGENCY   Status: Resolved   





(6) Diabetes


Code(s): E11.9 - TYPE 2 DIABETES MELLITUS WITHOUT COMPLICATIONS   Status: 

Chronic   


Qualifiers: 


   Diabetes mellitus type: type 2   Diabetes mellitus long term insulin use: 

without long term use   Diabetes mellitus complication status: with kidney 

complications   Diabetes mellitus complication detail: with chronic kidney 

disease   Chronic kidney disease stage: stage 4 (severe)   Qualified Code(s): 

E11.22 - Type 2 diabetes mellitus with diabetic chronic kidney disease; N18.4 - 

Chronic kidney disease, stage 4 (severe); N18.4 - Chronic kidney disease, stage 

4 (severe); N18.4 - Chronic kidney disease, stage 4 (severe); N18.4 - Chronic 

kidney disease, stage 4 (severe)   





(7) Hyperlipidemia


Code(s): E78.5 - HYPERLIPIDEMIA, UNSPECIFIED   Status: Chronic   


Qualifiers: 


   Hyperlipidemia type: unspecified   Qualified Code(s): E78.5 - Hyperlipidemia

, unspecified   


Comment: Resume home statin when possible.   





(8) Hypertension


Code(s): I10 - ESSENTIAL (PRIMARY) HYPERTENSION   Status: Chronic   


Qualifiers: 


   Hypertension type: essential hypertension   Qualified Code(s): I10 - 

Essential (primary) hypertension   





(9) NORA (acute kidney injury)


Code(s): N17.9 - ACUTE KIDNEY FAILURE, UNSPECIFIED   Status: Acute   





(10) Metabolic acidosis


Code(s): E87.2 - ACIDOSIS   Status: Acute   





(11) FTT (failure to thrive) in adult


Status: Acute   





(12) Acute encephalopathy


Code(s): G93.40 - ENCEPHALOPATHY, UNSPECIFIED   Status: Acute   Comment: sec to 

massive cva   





- Plan


worsoning nora, on iv fluids now


-: start peg feeding, reglan tid via peg


-: kub xray, will start golytely if badly constipated


-: watch for residuals, give 150ml free water inbetween 3 cans of peg feeds


-: poor prognosis, hold discharge in view of worsoning renal function





* .








Review of Systems





- Medications/Allergies


Allergies/Adverse Reactions: 


 Allergies











Allergy/AdvReac Type Severity Reaction Status Date / Time


 


lisinopril [From Prinivil] Allergy   Verified 07/15/18 01:44











Medications: 


 Current Medications





Amlodipine Besylate (Norvasc)  10 mg PO DAILY [facility]


   Last Admin: 07/21/18 09:53 Dose:  10 mg


Artificial Tears (Tears Naturale)  2 drop EA EYE PRN PRN


   PRN Reason: DRY EYES


   Last Admin: 07/16/18 08:23 Dose:  2 drop


Aspirin (Aspirin)  325 mg PER TUBE DAILY [facility]


   Last Admin: 07/21/18 09:51 Dose:  325 mg


Carvedilol (Coreg)  25 mg PO BID [facility]


   Last Admin: 07/21/18 09:52 Dose:  25 mg


Clonidine (Catapres)  0.2 mg PO TID [facility]


   Last Admin: 07/21/18 09:52 Dose:  0.2 mg


Clopidogrel Bisulfate (Plavix)  75 mg PO DAILY [facility]


   Last Admin: 07/21/18 09:52 Dose:  75 mg


Dextrose/Water (Dextrose 50%)  25 gm SLOW IVP PRN PRN


   PRN Reason: Hypoglycemia


Epoetin Conner (Procrit)  7,500 units SC Q7D@0900 [facility]


   Last Admin: 07/19/18 10:18 Dose:  7,500 units


Glipizide (Glucotrol)  5 mg PO BID [facility]


   Last Admin: 07/21/18 11:53 Dose:  Not Given


Glucagon (Glucagon)  1 mg IM PRN PRN


   PRN Reason: Hypoglycemia


Heparin Sodium (Porcine) (Heparin)  5,000 units SC BID [facility]


   Last Admin: 07/21/18 09:56 Dose:  5,000 units


Hydralazine HCl (Apresoline)  10 mg SLOW IVP Q4H PRN


   PRN Reason: SBP>185


   Last Admin: 07/18/18 05:10 Dose:  10 mg


Hydralazine HCl (Apresoline)  25 mg PO TID [facility]


   Last Admin: 07/21/18 09:52 Dose:  25 mg


Dextrose/Water (D5w)  1,000 mls @ 0 mls/hr IV .Q0M PRN; As Directed


   PRN Reason: Hypoglycemia


Insulin Glargine 25 units/ (Miscellaneous Medication)  0.25 mls @ 0 mls/hr SC 

BID [facility]


   Last Admin: 07/21/18 11:55 Dose:  Not Given


Sodium Chloride (Normal Saline 0.9%)  1,000 mls @ 125 mls/hr IV .Q8H [facility]


   Last Admin: 07/21/18 09:49 Dose:  1,000 mls


Insulin Human Regular (Humulin R)  0 units SC .AGGRESSIVE SLIDING PRN; Protocol


   PRN Reason: AGGRESSIVE SLIDING SCALE


   Last Admin: 07/20/18 18:51 Dose:  9 unit


Isosorbide Dinitrate (Isordil)  30 mg PER TUBE BID [facility]


   Last Admin: 07/21/18 09:51 Dose:  30 mg


Metoclopramide HCl (Reglan)  10 mg IVP Q12HR [facility]


   Last Admin: 07/21/18 09:50 Dose:  10 mg


Minoxidil (Minoxidil)  10 mg PO DAILY [facility]


   Last Admin: 07/21/18 09:50 Dose:  10 mg


Ondansetron HCl (Zofran)  4 mg IVP Q6H PRN


   PRN Reason: Nausea/Vomiting


Pravastatin Sodium (Pravachol)  40 mg PO HS [facility]


   Last Admin: 07/20/18 21:51 Dose:  40 mg


Scopolamine (Transderm Scop)  1.5 mg TD Q3D [facility]


   Last Admin: 07/18/18 15:43 Dose:  1.5 mg


Sodium Bicarbonate (Bicarbonate, Sodium)  650 mg PO TID [facility]


   Last Admin: 07/21/18 09:51 Dose:  650 mg


Sodium Biphosphate/Sodium Phosphate (Fleet Enema)  133 ml DC ASDIR [facility]


   Last Admin: 07/21/18 07:43 Dose:  133 ml


Sodium Chloride (Flush - Normal Saline)  10 ml IVF Q12HR [facility]


   Last Admin: 07/21/18 09:55 Dose:  10 ml


Sodium Chloride (Flush - Normal Saline)  10 ml IVF PRN PRN


   PRN Reason: Saline Flush

## 2018-07-22 LAB
ANION GAP SERPL CALC-SCNC: 13 MMOL/L (ref 10–20)
BASOPHILS # BLD AUTO: 0 THOU/UL (ref 0–0.2)
BASOPHILS NFR BLD AUTO: 0 % (ref 0–1)
BUN SERPL-MCNC: 98 MG/DL (ref 9.8–20.1)
CALCIUM SERPL-MCNC: 8.3 MG/DL (ref 7.8–10.44)
CHLORIDE SERPL-SCNC: 118 MMOL/L (ref 98–107)
CO2 SERPL-SCNC: 14 MMOL/L (ref 23–31)
CREAT CL PREDICTED SERPL C-G-VRATE: 18 ML/MIN (ref 70–130)
EOSINOPHIL # BLD AUTO: 0.1 THOU/UL (ref 0–0.7)
EOSINOPHIL NFR BLD AUTO: 0.6 % (ref 0–10)
GLUCOSE SERPL-MCNC: 215 MG/DL (ref 83–110)
HGB BLD-MCNC: 7.1 G/DL (ref 12–16)
LYMPHOCYTES # BLD: 0.9 THOU/UL (ref 1.2–3.4)
LYMPHOCYTES NFR BLD AUTO: 8.9 % (ref 21–51)
MCH RBC QN AUTO: 29.5 PG (ref 27–31)
MCV RBC AUTO: 84 FL (ref 78–98)
MONOCYTES # BLD AUTO: 0.9 THOU/UL (ref 0.11–0.59)
MONOCYTES NFR BLD AUTO: 8.5 % (ref 0–10)
NEUTROPHILS # BLD AUTO: 8.7 THOU/UL (ref 1.4–6.5)
NEUTROPHILS NFR BLD AUTO: 82.1 % (ref 42–75)
PLATELET # BLD AUTO: 121 THOU/UL (ref 130–400)
POTASSIUM SERPL-SCNC: 4.1 MMOL/L (ref 3.5–5.1)
RBC # BLD AUTO: 2.42 MILL/UL (ref 4.2–5.4)
SODIUM SERPL-SCNC: 141 MMOL/L (ref 136–145)
WBC # BLD AUTO: 10.6 THOU/UL (ref 4.8–10.8)

## 2018-07-22 PROCEDURE — 30233N1 TRANSFUSION OF NONAUTOLOGOUS RED BLOOD CELLS INTO PERIPHERAL VEIN, PERCUTANEOUS APPROACH: ICD-10-PCS | Performed by: INTERNAL MEDICINE

## 2018-07-22 RX ADMIN — HEPARIN SODIUM SCH UNITS: 5000 INJECTION, SOLUTION INTRAVENOUS; SUBCUTANEOUS at 10:22

## 2018-07-22 RX ADMIN — Medication SCH: at 20:33

## 2018-07-22 RX ADMIN — SODIUM BICARBONATE SCH MG: 325 TABLET ORAL at 16:00

## 2018-07-22 RX ADMIN — INSULIN GLARGINE SCH MLS: 100 INJECTION, SOLUTION SUBCUTANEOUS at 10:23

## 2018-07-22 RX ADMIN — SODIUM BICARBONATE SCH MG: 325 TABLET ORAL at 21:35

## 2018-07-22 RX ADMIN — INSULIN HUMAN PRN UNIT: 100 INJECTION, SOLUTION PARENTERAL at 19:18

## 2018-07-22 RX ADMIN — HEPARIN SODIUM SCH UNITS: 5000 INJECTION, SOLUTION INTRAVENOUS; SUBCUTANEOUS at 21:35

## 2018-07-22 RX ADMIN — SODIUM BICARBONATE SCH MG: 325 TABLET ORAL at 10:25

## 2018-07-22 RX ADMIN — INSULIN HUMAN PRN UNIT: 100 INJECTION, SOLUTION PARENTERAL at 05:53

## 2018-07-22 RX ADMIN — INSULIN GLARGINE SCH: 100 INJECTION, SOLUTION SUBCUTANEOUS at 00:30

## 2018-07-22 RX ADMIN — INSULIN HUMAN PRN UNIT: 100 INJECTION, SOLUTION PARENTERAL at 14:42

## 2018-07-22 RX ADMIN — INSULIN GLARGINE SCH: 100 INJECTION, SOLUTION SUBCUTANEOUS at 21:31

## 2018-07-22 RX ADMIN — Medication SCH: at 22:13

## 2018-07-22 NOTE — PDOC.PN
- Subjective


Encounter Start Date: 07/22/18


Encounter Start Time: 08:00


Subjective: is awake, responds to verbal stimuli


-: moves right extremities but not left


-: PT here to work with her, is needing max assist to even sit





- Objective


Resuscitation Status: 


 











Resuscitation Status           FULL:Full Resuscitation














MAR Reviewed: Yes


Vital Signs & Weight: 


 Vital Signs (12 hours)











  Temp Pulse Resp BP BP Pulse Ox


 


 07/22/18 10:29   83    


 


 07/22/18 10:28   83   132/66  


 


 07/22/18 08:00  98.0 F  83  20   153/83 H  95


 


 07/22/18 04:00  98.4 F  82  19   143/69 H  95


 


 07/22/18 00:00  98.7 F  84  18   135/100 H  94 L








 Weight











Admit Weight                   170 lb 6.677 oz


 


Weight                         191 lb 3.2 oz











 Most Recent Monitor Data











Heart Rate from ECG            80


 


NIBP                           163/62


 


NIBP BP-Mean                   133


 


Respiration from ECG           21


 


SpO2                           100














I&O: 


 











 07/21/18 07/22/18 07/23/18





 06:59 06:59 06:59


 


Intake Total 1791 2087 1607


 


Balance 1791 2087 1607











Result Diagrams: 


 07/22/18 05:56





 07/22/18 05:56


Additional Labs: 


 Accuchecks











  07/22/18 07/21/18 07/21/18





  05:46 22:27 16:39


 


POC Glucose  228 H  183 H  195 H














  07/21/18





  12:03


 


POC Glucose  92














Phys Exam





- Physical Examination


HEENT: PERRLA, sclera anicteric


Neck: no JVD, supple


Respiratory: no wheezing, no rales


Cardiovascular: RRR, no significant murmur


Gastrointestinal: soft, non-tender, positive bowel sounds


Musculoskeletal: pulses present, edema present


left hemiplegia, dysphagia





Dx/Plan


(1) Acute CVA (cerebrovascular accident)


Code(s): I63.9 - CEREBRAL INFARCTION, UNSPECIFIED   Status: Acute   Comment: 

Dense left hemiplegia with large right MCA cva   





(2) CKD (chronic kidney disease) stage 4, GFR 15-29 ml/min


Code(s): N18.4 - CHRONIC KIDNEY DISEASE, STAGE 4 (SEVERE)   Status: Chronic   





(3) Dysphagia


Code(s): R13.10 - DYSPHAGIA, UNSPECIFIED   Status: Acute   Comment: s/p peg   





(4) Hemiplegia affecting left nondominant side


Code(s): G81.94 - HEMIPLEGIA, UNSPECIFIED AFFECTING LEFT NONDOMINANT SIDE   

Status: Acute   Comment: PT/OT.  Will need placement.   





(5) Hypertensive urgency


Code(s): I16.0 - HYPERTENSIVE URGENCY   Status: Resolved   





(6) Diabetes


Code(s): E11.9 - TYPE 2 DIABETES MELLITUS WITHOUT COMPLICATIONS   Status: 

Chronic   


Qualifiers: 


   Diabetes mellitus type: type 2   Diabetes mellitus long term insulin use: 

without long term use   Diabetes mellitus complication status: with kidney 

complications   Diabetes mellitus complication detail: with chronic kidney 

disease   Chronic kidney disease stage: stage 4 (severe)   Qualified Code(s): 

E11.22 - Type 2 diabetes mellitus with diabetic chronic kidney disease; N18.4 - 

Chronic kidney disease, stage 4 (severe); N18.4 - Chronic kidney disease, stage 

4 (severe); N18.4 - Chronic kidney disease, stage 4 (severe); N18.4 - Chronic 

kidney disease, stage 4 (severe)   





(7) Hyperlipidemia


Code(s): E78.5 - HYPERLIPIDEMIA, UNSPECIFIED   Status: Chronic   


Qualifiers: 


   Hyperlipidemia type: unspecified   Qualified Code(s): E78.5 - Hyperlipidemia

, unspecified   


Comment: Resume home statin when possible.   





(8) Hypertension


Code(s): I10 - ESSENTIAL (PRIMARY) HYPERTENSION   Status: Chronic   


Qualifiers: 


   Hypertension type: essential hypertension   Qualified Code(s): I10 - 

Essential (primary) hypertension   





(9) NORA (acute kidney injury)


Code(s): N17.9 - ACUTE KIDNEY FAILURE, UNSPECIFIED   Status: Acute   





(10) Metabolic acidosis


Code(s): E87.2 - ACIDOSIS   Status: Acute   





(11) FTT (failure to thrive) in adult


Status: Acute   





(12) Acute encephalopathy


Code(s): G93.40 - ENCEPHALOPATHY, UNSPECIFIED   Status: Acute   Comment: sec to 

massive cva   





- Plan


encephalopathy is slowly clearing up


-: nora holding up, is on iv fluids, watch for overload


-: constipation, 1 liter golytely, failed fleets enema


-: htn is better controlled


-: prognosis guarded, needs to mobilize more, peg feeds, free water as tolerat





* .








Review of Systems





- Medications/Allergies


Allergies/Adverse Reactions: 


 Allergies











Allergy/AdvReac Type Severity Reaction Status Date / Time


 


lisinopril [From Prinivil] Allergy   Verified 07/15/18 01:44











Medications: 


 Current Medications





Amlodipine Besylate (Norvasc)  10 mg PO DAILY [facility]


   Last Admin: 07/22/18 10:28 Dose:  10 mg


Artificial Tears (Tears Naturale)  2 drop EA EYE PRN PRN


   PRN Reason: DRY EYES


   Last Admin: 07/16/18 08:23 Dose:  2 drop


Aspirin (Aspirin)  325 mg PER TUBE DAILY [facility]


   Last Admin: 07/22/18 10:25 Dose:  325 mg


Carvedilol (Coreg)  25 mg PO BID [facility]


   Last Admin: 07/22/18 10:28 Dose:  25 mg


Clonidine (Catapres)  0.2 mg PO TID [facility]


   Last Admin: 07/22/18 10:28 Dose:  0.2 mg


Clopidogrel Bisulfate (Plavix)  75 mg PO DAILY [facility]


   Last Admin: 07/22/18 10:28 Dose:  75 mg


Dextrose/Water (Dextrose 50%)  25 gm SLOW IVP PRN PRN


   PRN Reason: Hypoglycemia


Epoetin Conner (Procrit)  7,500 units SC Q7D@0900 [facility]


   Last Admin: 07/19/18 10:18 Dose:  7,500 units


Glipizide (Glucotrol)  5 mg PO BID [facility]


   Last Admin: 07/22/18 10:25 Dose:  5 mg


Glucagon (Glucagon)  1 mg IM PRN PRN


   PRN Reason: Hypoglycemia


Heparin Sodium (Porcine) (Heparin)  5,000 units SC BID [facility]


   Last Admin: 07/22/18 10:22 Dose:  5,000 units


Hydralazine HCl (Apresoline)  10 mg SLOW IVP Q4H PRN


   PRN Reason: SBP>185


   Last Admin: 07/18/18 05:10 Dose:  10 mg


Hydralazine HCl (Apresoline)  25 mg PO TID [facility]


   Last Admin: 07/22/18 10:29 Dose:  25 mg


Dextrose/Water (D5w)  1,000 mls @ 0 mls/hr IV .Q0M PRN; As Directed


   PRN Reason: Hypoglycemia


Insulin Glargine 25 units/ (Miscellaneous Medication)  0.25 mls @ 0 mls/hr SC 

BID [facility]


   Last Admin: 07/22/18 10:23 Dose:  0.25 mls


Sodium Chloride (Normal Saline 0.9%)  1,000 mls @ 125 mls/hr IV .Q8H [facility]


   Last Admin: 07/22/18 05:26 Dose:  1,000 mls


Insulin Human Regular (Humulin R)  0 units SC .AGGRESSIVE SLIDING PRN; Protocol


   PRN Reason: AGGRESSIVE SLIDING SCALE


   Last Admin: 07/22/18 05:53 Dose:  6 unit


Isosorbide Dinitrate (Isordil)  30 mg PER TUBE BID [facility]


   Last Admin: 07/22/18 10:26 Dose:  30 mg


Metoclopramide HCl (Reglan)  10 mg PER TUBE TID [facility]


   Last Admin: 07/22/18 10:25 Dose:  10 mg


Minoxidil (Minoxidil)  10 mg PO DAILY [facility]


   Last Admin: 07/22/18 10:27 Dose:  10 mg


Ondansetron HCl (Zofran)  4 mg IVP Q6H PRN


   PRN Reason: Nausea/Vomiting


Polyethylene Glycol/Electrolytes (Golytely)  1,000 ml PO ONE [facility]


Pravastatin Sodium (Pravachol)  40 mg PO HS [facility]


   Last Admin: 07/21/18 21:54 Dose:  40 mg


Scopolamine (Transderm Scop)  1.5 mg TD Q3D [facility]


   Last Admin: 07/21/18 12:18 Dose:  1.5 mg


Sodium Bicarbonate (Bicarbonate, Sodium)  650 mg PO TID [facility]


   Last Admin: 07/22/18 10:25 Dose:  650 mg


Sodium Biphosphate/Sodium Phosphate (Fleet Enema)  133 ml DE ASDIR [facility]


   Last Admin: 07/21/18 07:43 Dose:  133 ml


Sodium Chloride (Flush - Normal Saline)  10 ml IVF Q12HR [facility]


   Last Admin: 07/21/18 21:56 Dose:  Not Given


Sodium Chloride (Flush - Normal Saline)  10 ml IVF PRN PRN


   PRN Reason: Saline Flush

## 2018-07-22 NOTE — PRG
DATE OF SERVICE:  07/22/2018

 

RENAL MEDICINE

 

SUBJECTIVE:  Ms. Purdy is a 73-year-old black female who was seen by the Renal Service for her chr
onic renal failure and fluctuating creatinine.  Due to the worsening renal dysfunction, she was start
ed on normal saline.  She has also been scheduled for blood transfusion.  This morning, she is more a
wake, alert, and able to converse appropriately.

 

Please note this patient recently was diagnosed with CVA with left hemiplegia.

 

PHYSICAL EXAMINATION:

VITAL SIGNS:  Blood pressure 153/83, heart rate 83, respiratory rate 20, temperature 98, pulse ox 95%
 on room air.

GENERAL:  Awake, alert, comfortable, not in distress.

SKIN:  Adequate turgor.

HEENT:  Slightly pale conjunctivae, anicteric sclerae.

NECK:  No neck mass, no carotid bruits, no JVD.

CHEST:  No deformities.

LUNGS:  Clear breath sounds.  No wheezing, no crackles.

HEART:  Normal sinus rhythm.  No murmur, no gallops, no rubs.

ABDOMEN:  Globular, soft, nontender.

EXTREMITIES:  No edema.

 

MEDICATIONS:  Medications of 07/22/2018 reviewed.

 

LABORATORY DATA:  [facility] of 07/22/2018; white count 10.6, hemoglobin 7.1.  Sodium 141, potassiu
m 4.1, chloride ____, carbon dioxide 14, BUN 98, creatinine 3.7, glucose 215, calcium was noted at 8.
3.

 

ASSESSMENT AND PLAN:

1.  Chronic renal failure, fluctuating creatinine.  Creatinine has stabilized from 3.82 to a most rec
ent value of 3.7.  Continue normal saline at 125 mL per hour.  Continue to optimize hemodynamics.  Th
e patient will be receiving 1-2 units of packed red blood cells today.

2.  Metabolic acidosis.  Continue sodium bicarbonate.

3.  Anemia p.r.n. blood transfusion.  Continue weekly Epogen.

4.  Hypertension, stable.  Continue current antihypertensive regimen.  We will be rechecking a base m
et and CBC in a.m.

## 2018-07-23 VITALS — TEMPERATURE: 98.6 F

## 2018-07-23 VITALS — SYSTOLIC BLOOD PRESSURE: 139 MMHG | DIASTOLIC BLOOD PRESSURE: 71 MMHG

## 2018-07-23 LAB
ANION GAP SERPL CALC-SCNC: 12 MMOL/L (ref 10–20)
BASOPHILS # BLD AUTO: 0 THOU/UL (ref 0–0.2)
BASOPHILS NFR BLD AUTO: 0.1 % (ref 0–1)
BUN SERPL-MCNC: 94 MG/DL (ref 9.8–20.1)
CALCIUM SERPL-MCNC: 8.4 MG/DL (ref 7.8–10.44)
CHLORIDE SERPL-SCNC: 121 MMOL/L (ref 98–107)
CO2 SERPL-SCNC: 17 MMOL/L (ref 23–31)
CREAT CL PREDICTED SERPL C-G-VRATE: 20 ML/MIN (ref 70–130)
EOSINOPHIL # BLD AUTO: 0.1 THOU/UL (ref 0–0.7)
EOSINOPHIL NFR BLD AUTO: 1 % (ref 0–10)
GLUCOSE SERPL-MCNC: 174 MG/DL (ref 83–110)
HGB BLD-MCNC: 8.6 G/DL (ref 12–16)
LYMPHOCYTES # BLD: 0.9 THOU/UL (ref 1.2–3.4)
LYMPHOCYTES NFR BLD AUTO: 8 % (ref 21–51)
MCH RBC QN AUTO: 29.4 PG (ref 27–31)
MCV RBC AUTO: 85.4 FL (ref 78–98)
MONOCYTES # BLD AUTO: 0.8 THOU/UL (ref 0.11–0.59)
MONOCYTES NFR BLD AUTO: 6.7 % (ref 0–10)
NEUTROPHILS # BLD AUTO: 9.5 THOU/UL (ref 1.4–6.5)
NEUTROPHILS NFR BLD AUTO: 84.2 % (ref 42–75)
PLATELET # BLD AUTO: 128 THOU/UL (ref 130–400)
POTASSIUM SERPL-SCNC: 4.1 MMOL/L (ref 3.5–5.1)
RBC # BLD AUTO: 2.93 MILL/UL (ref 4.2–5.4)
SODIUM SERPL-SCNC: 146 MMOL/L (ref 136–145)
WBC # BLD AUTO: 11.3 THOU/UL (ref 4.8–10.8)

## 2018-07-23 RX ADMIN — SODIUM BICARBONATE SCH MG: 325 TABLET ORAL at 15:09

## 2018-07-23 RX ADMIN — INSULIN GLARGINE SCH MLS: 100 INJECTION, SOLUTION SUBCUTANEOUS at 08:31

## 2018-07-23 RX ADMIN — SODIUM BICARBONATE SCH MG: 325 TABLET ORAL at 08:28

## 2018-07-23 RX ADMIN — INSULIN HUMAN PRN UNIT: 100 INJECTION, SOLUTION PARENTERAL at 15:19

## 2018-07-23 RX ADMIN — Medication SCH: at 10:51

## 2018-07-23 RX ADMIN — HEPARIN SODIUM SCH UNITS: 5000 INJECTION, SOLUTION INTRAVENOUS; SUBCUTANEOUS at 08:30

## 2018-07-23 NOTE — PDOC.PN
- Subjective


Encounter Start Date: 07/23/18


Encounter Start Time: 10:30


Subjective: awake, responds to verbal stimuli


-: is able to cough now


-: moves right extremities





- Objective


Resuscitation Status: 


 











Resuscitation Status           FULL:Full Resuscitation














MAR Reviewed: Yes


Vital Signs & Weight: 


 Vital Signs (12 hours)











  Temp Pulse Resp BP BP Pulse Ox


 


 07/23/18 11:00  97.6 F  75  15   145/60 H  95


 


 07/23/18 08:30   82   186/87 H  


 


 07/23/18 08:29     186/87 H  


 


 07/23/18 08:28   82   186/87 H  


 


 07/23/18 08:00  98.4 F  82  13   


 


 07/23/18 07:55  98.4 F  83  13   186/87 H  95


 


 07/23/18 03:27  97.8 F  80  22 H   176/79 H  94 L








 Weight











Admit Weight                   170 lb 6.677 oz


 


Weight                         199 lb 3.2 oz











 Most Recent Monitor Data











Heart Rate from ECG            80


 


NIBP                           163/62


 


NIBP BP-Mean                   133


 


Respiration from ECG           21


 


SpO2                           100














I&O: 


 











 07/22/18 07/23/18 07/24/18





 06:59 06:59 06:59


 


Intake Total 2087 3754 


 


Balance 2087 3754 











Result Diagrams: 


 07/23/18 05:05





 07/23/18 05:05


Additional Labs: 


 Accuchecks











  07/23/18 07/23/18 07/23/18





  11:07 05:03 00:24


 


POC Glucose  215 H  172 H  174 H














  07/22/18 07/22/18 07/22/18





  21:32 18:15 13:26


 


POC Glucose  159 H  250 H  268 H














Phys Exam





- Physical Examination


HEENT: PERRLA, sclera anicteric


Neck: no JVD, supple


Respiratory: no wheezing, no rales


Cardiovascular: RRR, no significant murmur


Gastrointestinal: soft, non-tender, positive bowel sounds


peg+


Musculoskeletal: pulses present, edema present


left hemiplegia, dysphagia


responds well to verbal questions





Dx/Plan


(1) Acute CVA (cerebrovascular accident)


Code(s): I63.9 - CEREBRAL INFARCTION, UNSPECIFIED   Status: Acute   Comment: 

Dense left hemiplegia with large right MCA cva   





(2) CKD (chronic kidney disease) stage 4, GFR 15-29 ml/min


Code(s): N18.4 - CHRONIC KIDNEY DISEASE, STAGE 4 (SEVERE)   Status: Chronic   





(3) Dysphagia


Code(s): R13.10 - DYSPHAGIA, UNSPECIFIED   Status: Acute   Comment: s/p peg   





(4) Hemiplegia affecting left nondominant side


Code(s): G81.94 - HEMIPLEGIA, UNSPECIFIED AFFECTING LEFT NONDOMINANT SIDE   

Status: Acute   Comment: PT/OT.  Will need placement.   





(5) Hypertensive urgency


Code(s): I16.0 - HYPERTENSIVE URGENCY   Status: Resolved   





(6) Diabetes


Code(s): E11.9 - TYPE 2 DIABETES MELLITUS WITHOUT COMPLICATIONS   Status: 

Chronic   


Qualifiers: 


   Diabetes mellitus type: type 2   Diabetes mellitus long term insulin use: 

without long term use   Diabetes mellitus complication status: with kidney 

complications   Diabetes mellitus complication detail: with chronic kidney 

disease   Chronic kidney disease stage: stage 4 (severe)   Qualified Code(s): 

E11.22 - Type 2 diabetes mellitus with diabetic chronic kidney disease; N18.4 - 

Chronic kidney disease, stage 4 (severe); N18.4 - Chronic kidney disease, stage 

4 (severe); N18.4 - Chronic kidney disease, stage 4 (severe); N18.4 - Chronic 

kidney disease, stage 4 (severe)   





(7) Hyperlipidemia


Code(s): E78.5 - HYPERLIPIDEMIA, UNSPECIFIED   Status: Chronic   


Qualifiers: 


   Hyperlipidemia type: unspecified   Qualified Code(s): E78.5 - Hyperlipidemia

, unspecified   


Comment: Resume home statin when possible.   





(8) Hypertension


Code(s): I10 - ESSENTIAL (PRIMARY) HYPERTENSION   Status: Chronic   


Qualifiers: 


   Hypertension type: essential hypertension   Qualified Code(s): I10 - 

Essential (primary) hypertension   





(9) NORA (acute kidney injury)


Code(s): N17.9 - ACUTE KIDNEY FAILURE, UNSPECIFIED   Status: Acute   Comment: 

stable   





(10) Metabolic acidosis


Code(s): E87.2 - ACIDOSIS   Status: Acute   Comment: stable   





(11) FTT (failure to thrive) in adult


Status: Acute   





(12) Acute encephalopathy


Code(s): G93.40 - ENCEPHALOPATHY, UNSPECIFIED   Status: Acute   Comment: sec to 

massive cva   





- Plan


creatinine stable, bun is trending down


-: continue peg feeding with free water


-: overall prognosis is guarded


-: dc pt to [facility]


-: weekly labs to 's office





* .

## 2018-07-23 NOTE — PRG
DATE OF SERVICE:  07/23/2018

 

SUBJECTIVE:  Ms. Purdy is a 73-year-old black female with  chronic renal failure and admitted for 
a cerebrovascular accident with left hemiplegia.  Over the last several days, her mentation has slowl
y been improving.  We have been consulted to follow up with the patient's chronic renal failure.  Her
 creatinine has been fluctuating.  Recently, the BUN was noted to be 100 and for that reason was star
collin hydrating this patient with normal saline at 125 mL per hour.  In addition, her furosemide has be
en discontinued.  She is mentating better.  She can follow commands now and she does converse with he
r providers.

 

No other complaints today.

 

PHYSICAL EXAMINATION:

VITAL SIGNS:  Blood pressure is 186/87 - before medications, heart rate 82, respiratory rate 13, temp
erature 98.4, pulse ox 95%.

GENERAL:  Noted to be awake, supine, comfortable, not in distress.

SKIN:  Adequate turgor. 

HEENT:  Pinkish conjunctivae, anicteric sclerae.

NECK:  No neck mass, no carotid bruits, no JVD.

CHEST:  No deformities.

LUNGS:  Clear breath sounds, no wheezing, no crackles.

HEART:  Normal sinus rhythm.  No murmur, no gallops or rubs.

ABDOMEN:  Globular, soft, nontender.  No masses.

EXTREMITIES:  Trace edema.

NEUROLOGIC:  The patient is awake, can follow commands.  Positive for left hemiplegia.

 

MEDICATIONS:  07/23/2018 - Reviewed.

 

LABORATORY DATA:  07/23/2018 - White count 11.3, hemoglobin 8.6.  Sodium 146, potassium 4.1, chloride
 121, carbon dioxide 17, BUN 94, creatinine 3.41, glucose 174, calcium 8.4.

 

ASSESSMENT AND PLAN:  

1.  Mild hypernatremia.  Change IV fluid to half normal saline and to run at 125 mL per hour.

2.  Acute kidney injury on top of her chronic renal failure, stabilizing renal function.  Creatinine 
is improved at 3.41 from a peak value of 3.82.  She is nearing her baseline.  Continue supportive car
e.  Continue to optimize hemodynamics.

3.  Anemia.  She received 2 units of packed red blood cells.  Continue weekly Epogen.

4.  Status post cerebrovascular accident with left hemiplegia.  Continue supportive care.

 

Please note the patient has a PEG tube and is being fed through the PEG tube.

## 2018-07-24 NOTE — DIS
DATE OF ADMISSION:  07/11/2018

 

DATE OF DISCHARGE:  07/23/2018

 

DISCHARGE DISPOSITION:  To [facility].

 

PRIMARY DISCHARGE DIAGNOSES:  Massive right middle cerebral artery infarct with 
left hemiplegia, dysphagia, acute kidney injury on top of chronic kidney 
disease stage 4, dysphagia, status post PEG, hypertensive urgency on arrival 
resolving, diabetes mellitus type 2, hypertension, dyslipidemia, metabolic 
acidosis due to kidney injury, failure to thrive, acute encephalopathy, 
resolving from last 48 hours.

 

PROCEDURES DONE DURING HOSPITALIZATION:  CT brain done on the day of admission, 
which showed large right MCA distribution subacute infarct without evidence of 
acute hemorrhage.  Carotid Doppler done showed no hemodynamically significant 
stenosis.  There was bilateral plaque seen in the carotids.  Echo with 2D 
Doppler showed an EF of 60-65%.  Patient had PEG tube placed on the 17th for 
oropharyngeal dysphagia secondary to stroke.  Discharge H&H 8.6 and 25, it 
dropped down to 7.1 and 20 and was given a unit of transfusion.  On the 22nd, 
patient also had a transfusion done on the 15th that is total of 2 units of 
packed cells were given during her admission here.  Discharge BUN and 
creatinine 99 and 3.4.  Admitting creatinine was 3.59.  Admitting BUN was 45.  
Total cholesterol 128, triglycerides 160, LDL 71, HDL 25.

 

DISCHARGE MEDICATIONS:  Aspirin 325 mg p.o. daily, clonidine 0.2 mg 3 times 
daily, Coreg 25 mg twice daily, calcitriol 0.25 mcg p.o. daily, Norvasc 10 mg 
daily, Plavix 75 mg daily, Colace 100 mg twice daily, Lasix 40 mg daily, 
Glucotrol 5 mg twice daily, hydralazine 25 mg 3 times daily, Lantus 25 units 
subcutaneously twice daily, Isordil 30 mg p.o. twice daily, Arava 20 mg daily, 
Reglan 10 mg 3 times daily, minoxidil 10 mg daily, MiraLax 17 grams daily, 
pravastatin 40 mg p.o. at bedtime, scopolamine transdermal patch 1.5 mg q.72 
hourly, sodium bicarbonate 650 mg 3 times daily.

 

ALLERGIES:  LISINOPRIL.

 

INPATIENT CONSULTS:  Dr. Gates for Pulmonology, Dr. Patricia Moreno for Neurology, 
Dr. Leahy for Nephrology.

 

DISCHARGE PLAN:  Patient will need weekly renal function panel and the results 
to be faxed to Dr. Leahy.

 

BRIEF COURSE DURING HOSPITALIZATION:  Patient initially got admitted on 07/11/
2018 for altered mental state.  Her initial CT brain done showed massive right 
MCA infarct with left hemiplegia.  Patient also had acute encephalopathy due to 
stroke and had dysphagia as well.  She had hypertensive emergency on arrival 
and had to be placed on Cardene drip.  She was admitted to ICU.  Patient was 
later downgraded to medical floor after blood pressure was stabilized.  Patient 
continued to have dysphagia and had a feeding tube placed by Dr. Modesto Adan.  
Her encephalopathy is slowly resolving in the last 72 hours prior to discharge.
  She is following verbal stimuli and moving her right upper and lower 
extremity.  She is able to cough at the time of discharge.  The patient had 
severe constipation and failed Fleet's enema along with stool softeners 
initially and had to be given a liter of GoLYTELY.  She has had a large bowel 
movement.  She is currently tolerating 3-4 cans of Nepro.  She is also 
tolerating free water 150-200 mL in between feeds.  Patient has multiple 
medical issues and the family is clearly aware of her prognosis being guarded.  
She is at her baseline creatinine, although her BUN is elevated due to her not 
having oral intake for a long time during her stay here.  She was initiated on 
PEG feeding on the 18th, but then had to be stopped after 2 days due to 
increased residual due to constipation and ileus with increased residual and 
has been restarted from the last 36 hours and is tolerating it.  She has still 
not participated actively with physical therapy except for max assist to sit on 
the bed.

 

A total of 35 minutes was spent on discharge plan.  I just spoke to Ms. Nolen, 
patient's daughter and POA prior to her being discharged to [facility].  Please see a face-to-face documentation on [facility] for the day of 
discharge.

 

[facility]

## 2018-07-25 ENCOUNTER — HOSPITAL ENCOUNTER (INPATIENT)
Dept: HOSPITAL 92 - ERS | Age: 73
LOS: 33 days | Discharge: NURSING, REHAB OR LONG-TERM CARE | DRG: 3 | End: 2018-08-27
Attending: INTERNAL MEDICINE | Admitting: INTERNAL MEDICINE
Payer: MEDICARE

## 2018-07-25 VITALS — BODY MASS INDEX: 28.5 KG/M2

## 2018-07-25 DIAGNOSIS — N39.0: ICD-10-CM

## 2018-07-25 DIAGNOSIS — B37.49: ICD-10-CM

## 2018-07-25 DIAGNOSIS — J69.0: ICD-10-CM

## 2018-07-25 DIAGNOSIS — Z79.4: ICD-10-CM

## 2018-07-25 DIAGNOSIS — J96.21: ICD-10-CM

## 2018-07-25 DIAGNOSIS — G93.40: ICD-10-CM

## 2018-07-25 DIAGNOSIS — I21.A1: ICD-10-CM

## 2018-07-25 DIAGNOSIS — N17.9: ICD-10-CM

## 2018-07-25 DIAGNOSIS — B96.89: ICD-10-CM

## 2018-07-25 DIAGNOSIS — K91.71: ICD-10-CM

## 2018-07-25 DIAGNOSIS — K64.9: ICD-10-CM
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
## 2018-07-25 DIAGNOSIS — K56.7: ICD-10-CM

## 2018-07-25 DIAGNOSIS — I12.0: ICD-10-CM

## 2018-07-25 DIAGNOSIS — L03.311: ICD-10-CM

## 2018-07-25 DIAGNOSIS — K31.6: ICD-10-CM

## 2018-07-25 DIAGNOSIS — K94.22: Primary | ICD-10-CM

## 2018-07-25 DIAGNOSIS — R13.12: ICD-10-CM

## 2018-07-25 DIAGNOSIS — I16.0: ICD-10-CM

## 2018-07-25 DIAGNOSIS — Z79.02: ICD-10-CM

## 2018-07-25 DIAGNOSIS — R62.7: ICD-10-CM

## 2018-07-25 DIAGNOSIS — Z79.82: ICD-10-CM

## 2018-07-25 DIAGNOSIS — I69.320: ICD-10-CM

## 2018-07-25 DIAGNOSIS — E87.0: ICD-10-CM

## 2018-07-25 DIAGNOSIS — E86.0: ICD-10-CM

## 2018-07-25 DIAGNOSIS — I69.354: ICD-10-CM

## 2018-07-25 DIAGNOSIS — A41.9: ICD-10-CM

## 2018-07-25 DIAGNOSIS — E11.649: ICD-10-CM

## 2018-07-25 DIAGNOSIS — G93.6: ICD-10-CM

## 2018-07-25 DIAGNOSIS — E11.22: ICD-10-CM

## 2018-07-25 DIAGNOSIS — E87.2: ICD-10-CM

## 2018-07-25 DIAGNOSIS — K94.29: ICD-10-CM

## 2018-07-25 DIAGNOSIS — D69.6: ICD-10-CM

## 2018-07-25 DIAGNOSIS — I69.391: ICD-10-CM

## 2018-07-25 DIAGNOSIS — D63.1: ICD-10-CM

## 2018-07-25 DIAGNOSIS — K94.23: ICD-10-CM

## 2018-07-25 DIAGNOSIS — R18.8: ICD-10-CM

## 2018-07-25 DIAGNOSIS — N18.6: ICD-10-CM

## 2018-07-25 LAB
ALBUMIN SERPL BCG-MCNC: 2.9 G/DL (ref 3.4–4.8)
ALP SERPL-CCNC: 272 U/L (ref 40–150)
ALT SERPL W P-5'-P-CCNC: 40 U/L (ref 8–55)
ANION GAP SERPL CALC-SCNC: 14 MMOL/L (ref 10–20)
AST SERPL-CCNC: 48 U/L (ref 5–34)
BASOPHILS # BLD AUTO: 0 THOU/UL (ref 0–0.2)
BASOPHILS NFR BLD AUTO: 0.3 % (ref 0–1)
BILIRUB SERPL-MCNC: 0.6 MG/DL (ref 0.2–1.2)
BUN SERPL-MCNC: 93 MG/DL (ref 9.8–20.1)
CALCIUM SERPL-MCNC: 8.9 MG/DL (ref 7.8–10.44)
CHLORIDE SERPL-SCNC: 119 MMOL/L (ref 98–107)
CO2 SERPL-SCNC: 19 MMOL/L (ref 23–31)
CREAT CL PREDICTED SERPL C-G-VRATE: 0 ML/MIN (ref 70–130)
CRYSTAL-AUWI FLAG: 0 (ref 0–15)
CRYSTALS URNS QL MICRO: (no result) HPF
EOSINOPHIL # BLD AUTO: 0.1 THOU/UL (ref 0–0.7)
EOSINOPHIL NFR BLD AUTO: 0.5 % (ref 0–10)
GLOBULIN SER CALC-MCNC: 3.6 G/DL (ref 2.4–3.5)
GLUCOSE SERPL-MCNC: 122 MG/DL (ref 83–110)
HEV IGM SER QL: 17.7 (ref 0–7.99)
HGB BLD-MCNC: 9.5 G/DL (ref 12–16)
HYALINE CASTS #/AREA URNS LPF: (no result) LPF
LIPASE SERPL-CCNC: 50 U/L (ref 8–78)
LYMPHOCYTES # BLD: 0.9 THOU/UL (ref 1.2–3.4)
LYMPHOCYTES NFR BLD AUTO: 6.3 % (ref 21–51)
MCH RBC QN AUTO: 29.5 PG (ref 27–31)
MCV RBC AUTO: 86.2 FL (ref 78–98)
MONOCYTES # BLD AUTO: 0.8 THOU/UL (ref 0.11–0.59)
MONOCYTES NFR BLD AUTO: 5.8 % (ref 0–10)
NEUTROPHILS # BLD AUTO: 11.9 THOU/UL (ref 1.4–6.5)
NEUTROPHILS NFR BLD AUTO: 87.1 % (ref 42–75)
PATHC CAST-AUWI FLAG: 2.9 (ref 0–2.49)
PLATELET # BLD AUTO: 166 THOU/UL (ref 130–400)
POTASSIUM SERPL-SCNC: 4.5 MMOL/L (ref 3.5–5.1)
PROT UR STRIP.AUTO-MCNC: 100 MG/DL
RBC # BLD AUTO: 3.22 MILL/UL (ref 4.2–5.4)
RBC UR QL AUTO: (no result) HPF (ref 0–3)
SODIUM SERPL-SCNC: 147 MMOL/L (ref 136–145)
SP GR UR STRIP: 1.01 (ref 1–1.04)
SPERM-AUWI FLAG: 0 (ref 0–9.9)
WBC # BLD AUTO: 13.6 THOU/UL (ref 4.8–10.8)
WBC UR QL AUTO: (no result) HPF (ref 0–3)
YEAST-AUWI FLAG: 317.3 (ref 0–25)

## 2018-07-25 PROCEDURE — 80061 LIPID PANEL: CPT

## 2018-07-25 PROCEDURE — 86901 BLOOD TYPING SEROLOGIC RH(D): CPT

## 2018-07-25 PROCEDURE — 83880 ASSAY OF NATRIURETIC PEPTIDE: CPT

## 2018-07-25 PROCEDURE — 87186 SC STD MICRODIL/AGAR DIL: CPT

## 2018-07-25 PROCEDURE — 80053 COMPREHEN METABOLIC PANEL: CPT

## 2018-07-25 PROCEDURE — 81003 URINALYSIS AUTO W/O SCOPE: CPT

## 2018-07-25 PROCEDURE — 85007 BL SMEAR W/DIFF WBC COUNT: CPT

## 2018-07-25 PROCEDURE — 84134 ASSAY OF PREALBUMIN: CPT

## 2018-07-25 PROCEDURE — 74018 RADEX ABDOMEN 1 VIEW: CPT

## 2018-07-25 PROCEDURE — 80202 ASSAY OF VANCOMYCIN: CPT

## 2018-07-25 PROCEDURE — 74340 X-RAY GUIDE FOR GI TUBE: CPT

## 2018-07-25 PROCEDURE — 82553 CREATINE MB FRACTION: CPT

## 2018-07-25 PROCEDURE — 85730 THROMBOPLASTIN TIME PARTIAL: CPT

## 2018-07-25 PROCEDURE — 36416 COLLJ CAPILLARY BLOOD SPEC: CPT

## 2018-07-25 PROCEDURE — 81001 URINALYSIS AUTO W/SCOPE: CPT

## 2018-07-25 PROCEDURE — 84484 ASSAY OF TROPONIN QUANT: CPT

## 2018-07-25 PROCEDURE — 71045 X-RAY EXAM CHEST 1 VIEW: CPT

## 2018-07-25 PROCEDURE — 82542 COL CHROMOTOGRAPHY QUAL/QUAN: CPT

## 2018-07-25 PROCEDURE — 36415 COLL VENOUS BLD VENIPUNCTURE: CPT

## 2018-07-25 PROCEDURE — 94760 N-INVAS EAR/PLS OXIMETRY 1: CPT

## 2018-07-25 PROCEDURE — P9035 PLATELET PHERES LEUKOREDUCED: HCPCS

## 2018-07-25 PROCEDURE — 85025 COMPLETE CBC W/AUTO DIFF WBC: CPT

## 2018-07-25 PROCEDURE — 80048 BASIC METABOLIC PNL TOTAL CA: CPT

## 2018-07-25 PROCEDURE — 90935 HEMODIALYSIS ONE EVALUATION: CPT

## 2018-07-25 PROCEDURE — 87040 BLOOD CULTURE FOR BACTERIA: CPT

## 2018-07-25 PROCEDURE — 83036 HEMOGLOBIN GLYCOSYLATED A1C: CPT

## 2018-07-25 PROCEDURE — A4217 STERILE WATER/SALINE, 500 ML: HCPCS

## 2018-07-25 PROCEDURE — 36430 TRANSFUSION BLD/BLD COMPNT: CPT

## 2018-07-25 PROCEDURE — C1769 GUIDE WIRE: HCPCS

## 2018-07-25 PROCEDURE — 87340 HEPATITIS B SURFACE AG IA: CPT

## 2018-07-25 PROCEDURE — 86850 RBC ANTIBODY SCREEN: CPT

## 2018-07-25 PROCEDURE — 83605 ASSAY OF LACTIC ACID: CPT

## 2018-07-25 PROCEDURE — G0257 UNSCHED DIALYSIS ESRD PT HOS: HCPCS

## 2018-07-25 PROCEDURE — P9016 RBC LEUKOCYTES REDUCED: HCPCS

## 2018-07-25 PROCEDURE — C9113 INJ PANTOPRAZOLE SODIUM, VIA: HCPCS

## 2018-07-25 PROCEDURE — 87086 URINE CULTURE/COLONY COUNT: CPT

## 2018-07-25 PROCEDURE — 85610 PROTHROMBIN TIME: CPT

## 2018-07-25 PROCEDURE — 93010 ELECTROCARDIOGRAM REPORT: CPT

## 2018-07-25 PROCEDURE — 86706 HEP B SURFACE ANTIBODY: CPT

## 2018-07-25 PROCEDURE — C1752 CATH,HEMODIALYSIS,SHORT-TERM: HCPCS

## 2018-07-25 PROCEDURE — 74176 CT ABD & PELVIS W/O CONTRAST: CPT

## 2018-07-25 PROCEDURE — 96374 THER/PROPH/DIAG INJ IV PUSH: CPT

## 2018-07-25 PROCEDURE — 87077 CULTURE AEROBIC IDENTIFY: CPT

## 2018-07-25 PROCEDURE — 82140 ASSAY OF AMMONIA: CPT

## 2018-07-25 PROCEDURE — 84100 ASSAY OF PHOSPHORUS: CPT

## 2018-07-25 PROCEDURE — A4216 STERILE WATER/SALINE, 10 ML: HCPCS

## 2018-07-25 PROCEDURE — 70450 CT HEAD/BRAIN W/O DYE: CPT

## 2018-07-25 PROCEDURE — 94640 AIRWAY INHALATION TREATMENT: CPT

## 2018-07-25 PROCEDURE — 81015 MICROSCOPIC EXAM OF URINE: CPT

## 2018-07-25 PROCEDURE — 94002 VENT MGMT INPAT INIT DAY: CPT

## 2018-07-25 PROCEDURE — 86922 COMPATIBILITY TEST ANTIGLOB: CPT

## 2018-07-25 PROCEDURE — 86921 COMPATIBILITY TEST INCUBATE: CPT

## 2018-07-25 PROCEDURE — 83735 ASSAY OF MAGNESIUM: CPT

## 2018-07-25 PROCEDURE — 94003 VENT MGMT INPAT SUBQ DAY: CPT

## 2018-07-25 PROCEDURE — P9047 ALBUMIN (HUMAN), 25%, 50ML: HCPCS

## 2018-07-25 PROCEDURE — 93005 ELECTROCARDIOGRAM TRACING: CPT

## 2018-07-25 PROCEDURE — S0028 INJECTION, FAMOTIDINE, 20 MG: HCPCS

## 2018-07-25 PROCEDURE — 82805 BLOOD GASES W/O2 SATURATION: CPT

## 2018-07-25 PROCEDURE — 83690 ASSAY OF LIPASE: CPT

## 2018-07-25 PROCEDURE — 87070 CULTURE OTHR SPECIMN AEROBIC: CPT

## 2018-07-25 PROCEDURE — 87205 SMEAR GRAM STAIN: CPT

## 2018-07-25 PROCEDURE — 44500 INTRO GASTROINTESTINAL TUBE: CPT

## 2018-07-25 PROCEDURE — 85027 COMPLETE CBC AUTOMATED: CPT

## 2018-07-25 PROCEDURE — 86900 BLOOD TYPING SEROLOGIC ABO: CPT

## 2018-07-25 RX ADMIN — SCOPALAMINE SCH MG: 1 PATCH, EXTENDED RELEASE TRANSDERMAL at 20:49

## 2018-07-25 RX ADMIN — INSULIN GLARGINE SCH: 100 INJECTION, SOLUTION SUBCUTANEOUS at 21:22

## 2018-07-25 NOTE — HP
DATE OF ADMISSION:  07/25/2018

 

CHIEF COMPLAINT:  Abdominal pain.

 

HISTORY OF PRESENT ILLNESS:  This is a 73-year-old white female.  She had a history of a CVA in the p
ast with persistent left-sided weakness and residuals with left facial palsy.  The patient had a PEG 
tube placement during last admission, this was 2 days ago when she was discharged to nursing home and
 it was noted that at the nursing home, the PEG tube was leaking around and she was having abdominal 
pain and she was throwing up coffee ground emesis.  When the patient arrived in the ER, she had a mil
d elevated white count and there was obvious leakage of fluid around the PEG tube.  CT abdomen was do
ne which did show an evidence of air and fluid along the anterior abdominal wall, which was suggested
 as an ascites.  The patient had a distended abdomen, but was nontender on palpation.  The patient de
nies having any chest pain.  No headache or dizziness at this time.  No fever.

 

No constipation.  The patient is passing gas, but no bowel movements today.

 

PAST MEDICAL HISTORY:

1.  Cerebrovascular accident.

2.  Type 2 diabetes mellitus.

3.  Hypertension.

4.  History of dysphagia with PEG tube feeding.

 

SOCIAL HISTORY:  The patient is not a known nonsmoker.  No history of alcohol, no history of illicit 
drug use.

 

FAMILY HISTORY:  Reviewed and noncontributory to the present complaint.

 

REVIEW OF SYSTEMS:  Unable to obtain as the patient has aphasia and she is not able to speak well, an
d unable to comprehend her.  Most of the complaints have been obtained from the patient's son who is 
at the bedside.

 

ALLERGIES:  No known drug allergies.

 

HOME MEDICATIONS:

1.  Amlodipine 10 mg p.o. daily.

2.  Aspirin 325 mg p.o. daily.

3.  Calcitriol 0.25 mcg p.o. daily.

4.  Coreg 25 mg p.o. b.i.d.

5.  Clonidine 0.2 mg p.o. t.i.d.

6.  Plavix 75 mg p.o. daily.

7.  Docusate.

8.  Lasix.

9.  Glipizide.

10.  Hydralazine.

11.  Insulin glargine subcu b.i.d.

12.  Isosorbide mononitrate.

13.  Leflunomide.

14.  Metoprolol.

15.  Metoclopramide.

16.  Minoxidil.

17.  Pravastatin.

18.  Scopolamine.

19.  Sodium bicarbonate.

 

PHYSICAL EXAMINATION:

VITAL SIGNS:  Blood pressures are 178/86, heart rate is 84, respiratory rate is 18, saturation 98% on
 room air.

GENERAL:  The patient is moderately built, moderately nourished.

HEENT:  Atraumatic, normocephalic.  PERRLA, extraocular muscles were intact.  Oral mucosa is pink and
 moist.

CARDIOVASCULAR:  S1, S2 normal.  No murmurs, rubs or gallops.

LUNGS:  Bilateral air entry was equal.  No wheezing, no crackles.

PSYCHIATRIC:  No signs of suicidal ideation.  No signs of jason was noted.

 

LABORATORY DATA AND IMAGING:  Sodium is 147, potassium 4.5, chloride is 119, BUN is 93, creatinine is
 3.32.  WBC is 13.6, hemoglobin is 9.5, hematocrit is 27.7, platelets 166.  UA was having leukocyte e
sterase positive with wbc's of 4.6, but no bacteria.

 

A CT of the abdomen was done which showed an evidence of moderate ascites in the abdomen or pelvis wi
th small pleural effusions and evidence of anasarca.  There was abnormal diffuse wall thickening of t
he stomach, which was nonspecific and the PEG tube in place with some minimal scattered oral contrast
 and air around the tube in the adjacent anterior abdominal wall tissues.

 

ASSESSMENT:

1.  PEG tube malfunction.

2.  Possible sepsis.

3.  Hypoglycemia.

4.  Hypernatremia

5.  Moderate to severe dehydration.

6.  History of cerebrovascular accident.

7.  Dysphagia.

 

PLAN:

1.  Plan is to closely monitor this patient, keep the patient n.p.o. and consult GI in the morning.  
The patient may have a possible leak from the PEG tube under the abdominal wall.  The patient does ha
ve distended abdomen with signs suggestive of ascites based on the CT abdomen, but the patient has no
 evidence of any cirrhosis.

2.  The patient has evidence of mildly elevated white cells which could be from dehydration, but seps
is cannot be ruled out, we will do a lactic acid at this time and do some septic workup.  We will get
 the blood cultures also.  When we will start the patient empirically on antibiotic, a Zosyn at this 
time.

3.  The patient has hypoglycemia.  The patient is n.p.o. as we would not start on the home dose of La
ntus, but we will start her on 15 units subcu at bedtime at this time and we will start the patient o
n D5 half NS as the patient has hypernatremia with possible water deficit.

4.  Worsening renal functions, it is likely from dehydration, prerenal azotemia.  We will continue wi
th IV fluids as stated above.

5.  Deep venous thrombosis prophylaxis, Lovenox.

 

I spent 75 minutes with this patient.

## 2018-07-25 NOTE — CT
ABDOMEN AND PELVIC CT SCAN WITHOUT IV CONTRAST:

 

Date:  07/25/18 

 

HISTORY:  

73-year-old female with history of abdominal distention and vomiting. 

 

FINDINGS:

Small bilateral pleural effusions. Minimal cardiomegaly. There is diffuse bilateral subcutaneous fat 
stranding, evidence for anasarca. The gallbladder is fairly prominently distended without overt gallb
ladder wall thickening. No ductal dilatation. The liver, pancreas, and spleen appear to be unremarkab
le as evaluated without IV contrast. No renal calculus or acute  obstruction. Borderline size somew
hat nodular appearing adrenal glands bilaterally, nonspecific, possibly some adrenal hyperplasia. Per
cutaneous PEG tube noted extending into the stomach with some gas or air noted around the tube in the
 adjacent anterior abdominal wall. Fairly marked diffuse abnormal thickening of the stomach wall, inc
luding fundus, body, and antrum. There is a small amount of oral contrast within the stomach lumen. N
ormal appearing appendix. 6.0 cm diameter densely calcified mass in the uterus consistent with a larg
e, calcified fibroid, as well as some smaller calcified fibroids. Bennett catheter within the bladder w
ith a small amount of air within the bladder. No evidence for bowel obstruction. No evidence for drai
nable intraabdominal or pelvic abscess. 

 

IMPRESSION: 

Moderate ascites in the abdomen or pelvis with small pleural effusions and evidence of anasarca. Mode
rately distended gallbladder without overt gallbladder wall thickening. PEG tube in place with some m
inimal scattered oral contrast and air around the tube in the adjacent anterior abdominal wall soft t
issues. Abnormal diffuse wall thickening of the stomach, nonspecific. Uterine calcified fibroids. Fol
ey catheter in the bladder with some air within the bladder. Normal appearing appendix. No renal calc
ulus or  obstruction. Minimal image degradation for motion artifact in the upper abdomen/lower ches
t. 

 

 

POS: [facility]

## 2018-07-26 LAB
ANION GAP SERPL CALC-SCNC: 14 MMOL/L (ref 10–20)
BASOPHILS # BLD AUTO: 0 THOU/UL (ref 0–0.2)
BASOPHILS NFR BLD AUTO: 0.1 % (ref 0–1)
BUN SERPL-MCNC: 92 MG/DL (ref 9.8–20.1)
CALCIUM SERPL-MCNC: 9.1 MG/DL (ref 7.8–10.44)
CHLORIDE SERPL-SCNC: 120 MMOL/L (ref 98–107)
CO2 SERPL-SCNC: 19 MMOL/L (ref 23–31)
CREAT CL PREDICTED SERPL C-G-VRATE: 23 ML/MIN (ref 70–130)
EOSINOPHIL # BLD AUTO: 0.1 THOU/UL (ref 0–0.7)
EOSINOPHIL NFR BLD AUTO: 0.7 % (ref 0–10)
GLUCOSE SERPL-MCNC: 100 MG/DL (ref 83–110)
HGB BLD-MCNC: 9.2 G/DL (ref 12–16)
LYMPHOCYTES # BLD: 0.8 THOU/UL (ref 1.2–3.4)
LYMPHOCYTES NFR BLD AUTO: 7.4 % (ref 21–51)
MCH RBC QN AUTO: 28.6 PG (ref 27–31)
MCV RBC AUTO: 86.4 FL (ref 78–98)
MONOCYTES # BLD AUTO: 0.6 THOU/UL (ref 0.11–0.59)
MONOCYTES NFR BLD AUTO: 5.8 % (ref 0–10)
NEUTROPHILS # BLD AUTO: 9 THOU/UL (ref 1.4–6.5)
NEUTROPHILS NFR BLD AUTO: 86 % (ref 42–75)
PLATELET # BLD AUTO: 168 THOU/UL (ref 130–400)
POTASSIUM SERPL-SCNC: 4.4 MMOL/L (ref 3.5–5.1)
RBC # BLD AUTO: 3.24 MILL/UL (ref 4.2–5.4)
SODIUM SERPL-SCNC: 149 MMOL/L (ref 136–145)
WBC # BLD AUTO: 10.5 THOU/UL (ref 4.8–10.8)

## 2018-07-26 PROCEDURE — 0DH63UZ INSERTION OF FEEDING DEVICE INTO STOMACH, PERCUTANEOUS APPROACH: ICD-10-PCS | Performed by: SURGERY

## 2018-07-26 PROCEDURE — 0JB80ZZ EXCISION OF ABDOMEN SUBCUTANEOUS TISSUE AND FASCIA, OPEN APPROACH: ICD-10-PCS | Performed by: SURGERY

## 2018-07-26 RX ADMIN — NITROGLYCERIN PRN INCH: 20 OINTMENT TOPICAL at 12:53

## 2018-07-26 RX ADMIN — Medication SCH: at 23:00

## 2018-07-26 RX ADMIN — CLONIDINE SCH MG: 0.3 PATCH TRANSDERMAL at 21:42

## 2018-07-26 RX ADMIN — INSULIN GLARGINE SCH: 100 INJECTION, SOLUTION SUBCUTANEOUS at 22:58

## 2018-07-26 RX ADMIN — ALBUMIN HUMAN SCH GM: 250 SOLUTION INTRAVENOUS at 11:42

## 2018-07-26 RX ADMIN — ALBUMIN HUMAN SCH: 250 SOLUTION INTRAVENOUS at 22:59

## 2018-07-26 NOTE — PDOC.PN
- Subjective


Encounter Start Date: 07/26/18


Encounter Start Time: 15:34





Ms. Purdy was seen today in follow-up of malfunctioning PEG tub. She is awake

, but a little lethargic. She denies having any pain. She has been seen by Dr. Petersen with Surgery a few minutes ago.





- Objective


Resuscitation Status: 


 











Resuscitation Status           FULL:Full Resuscitation














MAR Reviewed: Yes


Vital Signs & Weight: 


 Vital Signs (12 hours)











  Temp Pulse Resp BP BP Pulse Ox


 


 07/26/18 14:53   86   198/68 H  


 


 07/26/18 12:56      182/66 H 


 


 07/26/18 12:00  98.5 F  84  14    95


 


 07/26/18 11:46   80   202/78 H  


 


 07/26/18 09:46   84   198/78 H  


 


 07/26/18 09:33   84   198/78 H  


 


 07/26/18 08:15  99.2 F  84  18   


 


 07/26/18 08:00  99.2 F  84  18   198/78 H  97


 


 07/26/18 05:08   89   200/84 H  


 


 07/26/18 05:05  98.8 F  85  18   200/84 H  95








 Weight











Admit Weight                   209 lb 7.026 oz


 


Weight                         209 lb 7.026 oz














I&O: 


 











 07/25/18 07/26/18 07/27/18





 06:59 06:59 06:59


 


Intake Total   800


 


Output Total   850


 


Balance   -50











Result Diagrams: 


 07/26/18 04:15





 07/26/18 04:15


Additional Labs: 


 Accuchecks











  07/26/18 07/26/18 07/25/18





  10:30 06:32 21:16


 


POC Glucose  89  99  111 H














Phys Exam





- Physical Examination


HEENT: PERRLA


Respiratory: no wheezing


+ rhonchi bilaterally, no wheezing or rales


Cardiovascular: RRR, no significant murmur, no rub


Gastrointestinal: soft


+ distended, PEG  tube site- the ostomy is enlarged, and there is dark 


almost black drainage from the tube, BS are diminished


Musculoskeletal: edema present





Dx/Plan


(1) PEG tube malfunction


Code(s): K94.23 - GASTROSTOMY MALFUNCTION   Status: Acute   





(2) Acute CVA (cerebrovascular accident)


Code(s): I63.9 - CEREBRAL INFARCTION, UNSPECIFIED   Status: Acute   Comment: 

Dense left hemiplegia with large right MCA cva   





(3) CKD (chronic kidney disease) stage 4, GFR 15-29 ml/min


Code(s): N18.4 - CHRONIC KIDNEY DISEASE, STAGE 4 (SEVERE)   Status: Chronic   





(4) Diabetes


Code(s): E11.9 - TYPE 2 DIABETES MELLITUS WITHOUT COMPLICATIONS   Status: 

Chronic   


Qualifiers: 


   Diabetes mellitus type: type 2   Diabetes mellitus long term insulin use: 

without long term use   Diabetes mellitus complication status: with kidney 

complications   Diabetes mellitus complication detail: with chronic kidney 

disease   Chronic kidney disease stage: stage 4 (severe)   Qualified Code(s): 

E11.22 - Type 2 diabetes mellitus with diabetic chronic kidney disease; N18.4 - 

Chronic kidney disease, stage 4 (severe); N18.4 - Chronic kidney disease, stage 

4 (severe); N18.4 - Chronic kidney disease, stage 4 (severe); N18.4 - Chronic 

kidney disease, stage 4 (severe)   





(5) Hypertensive urgency


Code(s): I16.0 - HYPERTENSIVE URGENCY   Status: Resolved   





- Plan





* PEG tube malfunction- plan is for urgent surgery today


* Hypertensive Urgency- will increase the dose of Labetalol, and continue 

Hydralazine, and Catapres patch


* DM-continue SSI, as tolerated, and hypoglycemic protocol


* Chronic kidney disease- renal function is stable- she has been evaluated by 

Nephrology on her last admission, and this one as well..

## 2018-07-26 NOTE — PRG
DATE OF SERVICE:  07/26/2018

 

SUBJECTIVE:  Ms. Purdy is a 73-year-old black female with known history of chronic renal failure f
rom presumed hypertensive nephropathy, recently status post CVA with left-sided weakness and left fac
ial palsy and readmitted due to abdominal distension.  Imaging of the abdomen showed moderate ascites
.  Please note she has also a PEG tube.  We are being consulted for a followup with her chronic renal
 failure.  Of interest, her renal failure is noted to be stable and her creatinine is actually improv
ed compared to several days ago.  She was seen recently at [facility] ER.  She wa
s started back on diuretics.

 

Please note that previously we tried to diurese the patient, but renal function worsened.  I would shaikh
ggest we can start her on Lasix 40 mg b.i.d. with concomitant use of salt poor albumin in the hope th
at it will keep the renal function stable.  

 

No other complaints, no chest pain or shortness of breath.

 

OBJECTIVE:

VITAL SIGNS:  Blood pressure is 198/74, heart rate 84, respiratory rate 18, temperature 99.2, pulse o
x 97%.

GENERAL:  Noted to be arousable, conversant, not in distress.

SKIN:  Adequate turgor.

HEENT:  Pinkish conjunctivae, anicteric sclerae.

NECK:  No neck mass, no carotid bruits, no JVD.

CHEST:  No deformities.

LUNGS:  Decreased breath sounds.

HEART:  Normal sinus rhythm.  No murmur, no gallops, no rubs.

ABDOMEN:  Globular, soft, nontender.  Positive for ascites.  Positive for PEG tube.

EXTREMITIES:  No edema.

 

MEDICATIONS:  07/26/2018 - Showed the following:  Tylenol 650 mg q.4 hours p.r.n., aspirin 81 mg tabl
et day, clonidine patch TTS 2 q. 7 days, Lovenox 40 mg subcu every day, Pepcid 20 mg IV daily, [place]
g sliding scale, Zofran p.r.n., Zosyn 3.375 grams IV q.6 hours.  Normal saline 100 mL per hour.  

 

ASSESSMENT AND PLAN:  

1.  Chronic renal failure secondary to hypertensive nephropathy.  Her creatinine was actually noted a
t 3.26, which is actually improved from her last visit where she ran as high as 3.8.  Due to the hype
rnatremia, I would suggest we increase the free water through her PEG tube at least 200 mL every 6 ho
urs.  Change IV fluid to half normal saline.  Her serum sodium please note is noted at 149, which is 
elevated.

2.  Hypertension.  Resume back her previous blood pressure meds. We will review her chart.  For the [place], consider starting the patient on nifedipine 60 mg XL tab daily.

3.  Anemia - continue to observe, p.r.n. blood transfusion.

4.  Ascites.  Consider salt poor albumin 25 grams IV q.6 hours with Lasix 40 mg IV q.12 hours.  If re
nal function will worsen, we will discontinue or adjust Lasix.  There is no indication for any dialyt
ic intervention with this patient.  GFR is 17 mL per minute.  

 

The family is interested in considering dialysis if needed with this patient.

 

Due to the recent CVA, my bias is conservative management.

## 2018-07-26 NOTE — CON
DATE OF CONSULTATION:  07/26/2018

 

HISTORY OF PRESENT ILLNESS:  The patient is a 73-year-old female who was admitted to the hospital las
t night a transfer from nursing home where it was reported she had some abdominal distention, nausea,
 vomiting, and pain and that there was coffee ground like material seeping around her PEG tube site. 
 The patient did have a stroke and was in this hospital and had a PEG tube placed by Dr. Modesto Ambriz pull technique on 07/17/2018.  The patient was doing well with that.  Bumper was loosened an
d GI signed off on 07/18/2018.  There are some residual issues and residuals on tube feeds, but other
wise tolerating her feeds well.  When she left the hospital on 07/23/2018, she had a hemoglobin 8.6 a
nd white count 11.3.  On reviewing the CAT scan films in the emergency room, she has some air along t
he tract of the PEG tube in the stomach.  There is no evidence of free air in the abdomen.  There is 
drainage around the PEG tube contrast with the radiologist _____ on the liver, but not between the st
omach and the abdominal wall.  There is some wall thickening of the stomach reported, but there is no
 distention of the stomach with gas or with air or contrast.  Here, the patient was admitted and star
collin on Zosyn and albumin and Lovenox and Pepcid and a consult was called to my office this morning.

 

Past medical history is notable for CVA.  She may talk with the nurses.  Apparently, the patient was 
discharged on the 07/23/2018.  There is a report from the nursing [place] that the patient was in the em
ergency room at [facility] or at another facility yesterday.  The nurse here reports that the PEG that i
s in place is not the PEG that was in place when the patient left this facility.

 

PAST MEDICAL HISTORY:  CVA, type 2 diabetes, hypertension, history of oropharyngeal dysphagia with PE
G tube placed on the 07/17/2018.

 

SOCIAL HISTORY:  Unknown.  The patient does not answer questions.

 

FAMILY HISTORY:  Unknown as the patient does not answers questions.

 

REVIEW OF SYSTEMS:  Unable to obtain as this patient does not answers questions.

 

ALLERGIES:  None known.

 

MEDICATIONS:  From the nursing home on chart was amlodipine, aspirin, calcitriol, Coreg, clonidine, P
lavix, docusate, Lasix, glipizide, hydralazine, insulin, isosorbide, _____, metoprolol, Reglan, minox
sade, pravastatin, scopolamine, and sodium bicarbonate.

 

PRESENT MEDICATIONS HERE:  Patient is on Tylenol, albumin, Lovenox, Pepcid, Lasix, Zosyn.

 

PHYSICAL EXAMINATION:

GENERAL:  Patient is in the room.  She is nonresponsive.  This is similar to her while she was before
 she left the hospital after her stroke.  She is not answering questions.  She does not move spontane
ously.

VITAL SIGNS:  Temperature is 98, pulse 84, blood pressure 182/62.

LUNGS:  Decreased breath sounds.

HEART:  Regular rate and rhythm.

ABDOMEN:  No bowel sounds.  The PEG tube site has coffee-ground material coming out of the PEG tract.
  The PEG tract is very dilated and is very patulous and loose on the PEG tube.  The PEG tube is able
 to be moved although it is little bit tight.  Abdomen is soft and nontender.  There is slight fluid 
wave.  There is no shifting dullness.

 

LABORATORY AND X-RAY FINDINGS:  Sodium 149, potassium 4.2, BUN 92, creatinine 3.3, there was similar 
where the creatinine before she left the hospital.  On 07/25/2018, albumin is 2.9, AST and ALT 48 and
 40, alkaline phosphatase of 272.  White count 10, hemoglobin 9.2, platelet count is 168.  CT scan fi
lms reviewed.

 

ASSESSMENT:  There is air in the PEG tract.  The PEG tube tract is very loose.  There is a different 
PEG in the stomach and was placed here.  We placed a PEG with bumper on the inside and now is a PEG w
ith balloon that keeps it adherent and inflate on inside of the stomach.  The tract was deformed with
 air in it.  On CAT scan, there are no overt signs of cellulitis or necrotizing fasciitis, but I susp
ect it is likely that the PEG tube in some way was removed traumatically or came out and she was sent
 to another facility were a replacement PEG was placed blindly and it may have altered the tract, it 
looks like the PEG tubes in the stomach on the CAT scan, but I cannot be sure, I would not use it.

 

RECOMMENDATIONS:

1.  IV antibiotics.

2.  IV fluids.

3.  IV Protonix.

4.  Surgical consultation.  This whole I do not think it is going to close on its own and I do think 
we can reliably place a new PEG tube through the abdominal wall in this present condition.

## 2018-07-26 NOTE — HP
HISTORY OF PRESENT ILLNESS:  Simon Purdy is a 73-year-old male patient who has had a stroke, is 
on the stroke unit, has chronic kidney disease, seen by Dr. Baldo Leahy and admitted to the [facility] for PEG tube related problems.  Apparently this PEG tube was placed by Dr. Adan on 07/17/2018.  The
 patient was discharged from the hospital on 07/23/2018.  Apparently, soon after discharge, he went t
o the Med where the PEG tube was replaced as it was probably dislodged.  He is admitted on this occas
ion and has been admitted from the emergency room.  CAT scan demonstrating PEG tube be within the gas
tric lumen.  Dr. Rhodes saw him because he had a large cloacal opening at the skin and leaking PEG tu
be.  Patient is a full code.

 

ALLERGIES:  LISINOPRIL.

 

PAST MEDICAL HISTORY:  Stroke, aphasic, nonambulatory, type 2 diabetes mellitus, hypertension.

 

SOCIAL HISTORY:  Tobacco per history none.  Alcohol per history, none.  Drug use per history none.

 

FAMILY HISTORY:  Noncontributory.

 

PAST SURGICAL HISTORY:  PEG tube.

 

HOME MEDICATIONS:  Amlodipine, aspirin, Calcitriol, Coreg, clonidine, Plavix, docusate, Lasix, glipiz
gonzalez, hydralazine, insulin subcutaneous b.i.d., isosorbide, metoprolol, Reglan, Minoxidil, and scopola
mine.

 

PHYSICAL EXAMINATION:

VITAL SIGNS:  Height 5 feet 7 inches, 209 pounds, 32 BMI, 98.5, 86, 190/60.

LUNGS:  Clear.  No rhonchi.

CARDIAC:  Regular rate and rhythm.

ABDOMEN:  Soft, scar lower midline abdomen.  PEG tube with a large cloacal opening, no infection.  Ga
stric drainage.

EXTREMITIES:  No ankle edema.

 

LABORATORY DATA:  Sodium 149, potassium 4.4, BUN 92, creatinine 3.26 with chronic kidney disease.

 

ASSESSMENT AND PLAN:  Dysfunctional PEG tube.  We will plan placement of the new PEG tube and remove 
the old PEG tube.  We will plan this today.  Overall, prognosis is poor.  He is a full code.  Shawn adams discussions with family appropriately.

## 2018-07-27 LAB
ALBUMIN SERPL BCG-MCNC: 3.4 G/DL (ref 3.4–4.8)
ALBUMIN SERPL BCG-MCNC: 4.1 G/DL (ref 3.4–4.8)
ALP SERPL-CCNC: 196 U/L (ref 40–150)
ALP SERPL-CCNC: 217 U/L (ref 40–150)
ALT SERPL W P-5'-P-CCNC: 28 U/L (ref 8–55)
ALT SERPL W P-5'-P-CCNC: 31 U/L (ref 8–55)
ANION GAP SERPL CALC-SCNC: 19 MMOL/L (ref 10–20)
ANION GAP SERPL CALC-SCNC: 20 MMOL/L (ref 10–20)
AST SERPL-CCNC: 38 U/L (ref 5–34)
AST SERPL-CCNC: 45 U/L (ref 5–34)
BASOPHILS # BLD AUTO: 0 THOU/UL (ref 0–0.2)
BASOPHILS # BLD AUTO: 0 THOU/UL (ref 0–0.2)
BASOPHILS NFR BLD AUTO: 0.1 % (ref 0–1)
BASOPHILS NFR BLD AUTO: 0.4 % (ref 0–1)
BILIRUB SERPL-MCNC: 0.8 MG/DL (ref 0.2–1.2)
BILIRUB SERPL-MCNC: 0.8 MG/DL (ref 0.2–1.2)
BUN SERPL-MCNC: 88 MG/DL (ref 9.8–20.1)
BUN SERPL-MCNC: 92 MG/DL (ref 9.8–20.1)
CALCIUM SERPL-MCNC: 8.9 MG/DL (ref 7.8–10.44)
CALCIUM SERPL-MCNC: 9 MG/DL (ref 7.8–10.44)
CHLORIDE SERPL-SCNC: 121 MMOL/L (ref 98–107)
CHLORIDE SERPL-SCNC: 122 MMOL/L (ref 98–107)
CK MB SERPL-MCNC: 4.5 NG/ML (ref 0–6.6)
CO2 SERPL-SCNC: 15 MMOL/L (ref 23–31)
CO2 SERPL-SCNC: 16 MMOL/L (ref 23–31)
CREAT CL PREDICTED SERPL C-G-VRATE: 20 ML/MIN (ref 70–130)
CREAT CL PREDICTED SERPL C-G-VRATE: 21 ML/MIN (ref 70–130)
EOSINOPHIL # BLD AUTO: 0.1 THOU/UL (ref 0–0.7)
EOSINOPHIL # BLD AUTO: 0.1 THOU/UL (ref 0–0.7)
EOSINOPHIL NFR BLD AUTO: 0.7 % (ref 0–10)
EOSINOPHIL NFR BLD AUTO: 0.7 % (ref 0–10)
GLOBULIN SER CALC-MCNC: 2.6 G/DL (ref 2.4–3.5)
GLOBULIN SER CALC-MCNC: 2.9 G/DL (ref 2.4–3.5)
GLUCOSE SERPL-MCNC: 108 MG/DL (ref 83–110)
GLUCOSE SERPL-MCNC: 170 MG/DL (ref 83–110)
HGB BLD-MCNC: 7.4 G/DL (ref 12–16)
HGB BLD-MCNC: 8 G/DL (ref 12–16)
LYMPHOCYTES # BLD: 1 THOU/UL (ref 1.2–3.4)
LYMPHOCYTES # BLD: 1.1 THOU/UL (ref 1.2–3.4)
LYMPHOCYTES NFR BLD AUTO: 10.1 % (ref 21–51)
LYMPHOCYTES NFR BLD AUTO: 9.9 % (ref 21–51)
MAGNESIUM SERPL-MCNC: 2.4 MG/DL (ref 1.6–2.6)
MCH RBC QN AUTO: 29.5 PG (ref 27–31)
MCH RBC QN AUTO: 30.2 PG (ref 27–31)
MCV RBC AUTO: 86.4 FL (ref 78–98)
MCV RBC AUTO: 86.9 FL (ref 78–98)
MONOCYTES # BLD AUTO: 0.5 THOU/UL (ref 0.11–0.59)
MONOCYTES # BLD AUTO: 0.5 THOU/UL (ref 0.11–0.59)
MONOCYTES NFR BLD AUTO: 4.5 % (ref 0–10)
MONOCYTES NFR BLD AUTO: 5.2 % (ref 0–10)
NEUTROPHILS # BLD AUTO: 8.3 THOU/UL (ref 1.4–6.5)
NEUTROPHILS # BLD AUTO: 8.8 THOU/UL (ref 1.4–6.5)
NEUTROPHILS NFR BLD AUTO: 84 % (ref 42–75)
NEUTROPHILS NFR BLD AUTO: 84.2 % (ref 42–75)
PLATELET # BLD AUTO: 128 THOU/UL (ref 130–400)
PLATELET # BLD AUTO: 148 THOU/UL (ref 130–400)
POTASSIUM SERPL-SCNC: 4.3 MMOL/L (ref 3.5–5.1)
POTASSIUM SERPL-SCNC: 4.5 MMOL/L (ref 3.5–5.1)
RBC # BLD AUTO: 2.53 MILL/UL (ref 4.2–5.4)
RBC # BLD AUTO: 2.64 MILL/UL (ref 4.2–5.4)
SODIUM SERPL-SCNC: 152 MMOL/L (ref 136–145)
SODIUM SERPL-SCNC: 152 MMOL/L (ref 136–145)
TROPONIN I SERPL DL<=0.01 NG/ML-MCNC: 0.63 NG/ML (ref ?–0.03)
WBC # BLD AUTO: 10.4 THOU/UL (ref 4.8–10.8)
WBC # BLD AUTO: 9.9 THOU/UL (ref 4.8–10.8)

## 2018-07-27 RX ADMIN — PIPERACILLIN SODIUM, TAZOBACTAM SODIUM SCH MLS: 2; .25 INJECTION, POWDER, LYOPHILIZED, FOR SOLUTION INTRAVENOUS at 16:03

## 2018-07-27 RX ADMIN — NITROGLYCERIN PRN INCH: 20 OINTMENT TOPICAL at 04:24

## 2018-07-27 RX ADMIN — Medication SCH ML: at 09:27

## 2018-07-27 RX ADMIN — ALBUMIN HUMAN SCH GM: 250 SOLUTION INTRAVENOUS at 12:43

## 2018-07-27 RX ADMIN — ALBUMIN HUMAN SCH GM: 250 SOLUTION INTRAVENOUS at 06:25

## 2018-07-27 RX ADMIN — PIPERACILLIN SODIUM, TAZOBACTAM SODIUM SCH MLS: 2; .25 INJECTION, POWDER, LYOPHILIZED, FOR SOLUTION INTRAVENOUS at 21:30

## 2018-07-27 RX ADMIN — NIFEDIPINE SCH: 60 TABLET, EXTENDED RELEASE ORAL at 10:10

## 2018-07-27 RX ADMIN — ALBUMIN HUMAN SCH GM: 250 SOLUTION INTRAVENOUS at 17:28

## 2018-07-27 RX ADMIN — INSULIN LISPRO PRN UNIT: 100 INJECTION, SOLUTION INTRAVENOUS; SUBCUTANEOUS at 17:55

## 2018-07-27 RX ADMIN — Medication SCH ML: at 21:41

## 2018-07-27 RX ADMIN — INSULIN GLARGINE SCH MLS: 100 INJECTION, SOLUTION SUBCUTANEOUS at 21:41

## 2018-07-27 RX ADMIN — SODIUM BICARBONATE SCH MG: 325 TABLET ORAL at 21:32

## 2018-07-27 RX ADMIN — ALBUMIN HUMAN SCH GM: 250 SOLUTION INTRAVENOUS at 00:34

## 2018-07-27 NOTE — RAD
PORTABLE CHEST:

7/27/18

 

HISTORY: 

Respiratory distress.

 

COMPARISON:  

7/11/18 study.

 

Heart size is enlarged. Pulmonary vessels are mildly engorged. Interstitial markings are similar to t
he prior exam, probably largely chronic in nature. 

 

IMPRESSION:  

Cardiomegaly with mild vascular engorgement. Increased interstitial markings probably largely chronic
 in nature but could indicate some element of edema. 

 

POS: [facility]

## 2018-07-27 NOTE — PRG
DATE OF SERVICE:  07/27/2018

 

SUBJECTIVE:  Ms. Purdy is a 73-year-old black female followed up by the Renal Service for chronic 
renal failure.  Renal function has been fluctuating.  I suspect this is from her decreased p.o. intak
e as well as from the recent initiation of the furosemide.  In the interim, she had her PEG tube repl
aced by Dr. Petersen.

 

The PEG tube can now be used.  We will probably consider increasing free water by PEG tube.

 

OBJECTIVE:

VITAL SIGNS:  Blood pressure is 190/70, heart rate 99, temperature 98.9, respiratory rate 20, pulse o
x 92%.

GENERAL:  Noted to be sleepy, but arousable and can follow simple commands.

SKIN:  Adequate turgor.

HEENT:  Slightly pale conjunctivae.  Anicteric sclerae.

NECK:  No neck mass, no carotid bruits, no JVD.

CHEST:  No deformities.

LUNGS:  Decreased breath sounds.

HEART:  Normal sinus rhythm.  No murmur, no gallops, no rubs.

ABDOMEN:  Globular, soft, nontender.  Positive for PEG tube.

EXTREMITIES:  Trace edema.

 

MEDICATIONS:  Medications of 07/27/2018 was reviewed.

 

LABORATORY DATA:  Laboratories of 07/27/2018, sodium 152, potassium 4.5, chloride 102, carbon dioxide
 16, BUN 92, creatinine 3.61, glucose 108, calcium 8.9, AST 45, ALT 31.  GFR 15 mL per minute.  Album
in 3.4.

 

ASSESSMENT AND PLAN:

1.  Status post cerebrovascular accident with left hemiplegia.  Continue supportive care.

2.  Dysphagia -- currently PEG tube reinserted.  We will start tube feeding as soon as this is feasib
le.

3.  Hypernatremia.  Continuing half normal saline -- consider increasing to 125 mL an hour.

4.  Acute kidney injury/chronic renal failure.  Again, superimposed prerenal azotemia.  Increase IV f
luid to 125 mL per hour.

5.  Hypertension.  Blood pressure meds are now resumed.  She can start taking her p.o. antihypertensi
ve regimen since the PEG tube has been replaced.  Once the blood pressure is better controlled, we wi
ll resume back her Epogen for anemia.

## 2018-07-27 NOTE — PDOC.EVN
Event Note





- Event Note


Event Note: 


Patient had Code green called as she became unresponsive with incompreshensible 

sounds, her Blood pressure went upto 200 sytolic, with suspicion for a stroke, 

CT head was ordered, Pt also had a temp of 101, with Distended abdomen, 

suspiciuous for acute abdomen/ peritonitis, CT abdomen was ordered. pt will be 

moved to IMCU, discussed with Family about Code status, They want Full Code.

## 2018-07-27 NOTE — OP
DATE OF PROCEDURE:  07/26/2018

 

PREOPERATIVE DIAGNOSES:  Dysfunctional PEG tube with necrotizing infection around the PEG tube, necro
tic skin and subcutaneous tissue.

 

POSTOPERATIVE DIAGNOSES:   Dysfunctional PEG tube with necrotizing infection around the PEG tube, nec
rotic skin and subcutaneous tissue.

 

PROCEDURES PERFORMED:  Percutaneous endoscopic gastrostomy tube in new location; removal of old PEG t
ube; debridement of skin, subcutaneous tissue with closure of deep fascia.

 

SURGEON:  Jean-Paul Petersen MD

 

ANESTHESIA:  TIVA, local 0.5% Marcaine with epinephrine 30 mL mixed with 2% Xylocaine 10 mL

 

DESCRIPTION OF PROCEDURE:  The patient was taken to the operating room.  In supine position under int
ravenous sedation, endoscope was placed per os under direct visualization and air insufflation, passe
d down the esophagus and the stomach.  Identified a good indentation in right subxiphoid and stab inc
ision was made after local anesthetic infiltrated and trocar catheter introduced percutaneously into 
the abdominal cavity, identified endoscopically placing a snare around it, advancing the wire, graspi
ng the wire with the snare, bringing the endoscope and wire out of the mouth, connecting the wire to 
the feeding tube, lubricated the feeding tube, pulled it back down through the mouth into stomach fix
ating in the abdominal wall with a fixation device with antibiotic ointment and gauze.  The patient t
olerated the procedure well.  Tube tailored to length and feeding apparatus secured.

 

Abdomen was then prepared with ChloraPrep.  The old PEG tube site over the old PEG tube that had been
 removed was cleansed, and necrotic skin excised sharply with a 10 blade and necrotic subcutaneous ti
ssue excised sharply down to fascia.  Fascia was approximated with figure-of-eight suture and interru
pted sutures of 0 PDS pop-offs.  Wound was irrigated.  Gauze packing applied.  The patient tolerated 
the procedure well.

## 2018-07-27 NOTE — CT
CT OF BRAIN PERFORMED WITHOUT CONTRAST ENHANCEMENT:

7/27/18

 

COMPARISON:  

7/11/18 study.

 

HISTORY: 

Patient with history of stroke. Altered mental status. Confusion and hypertension. 

 

On the previous examination there is a large right MCA infarct which appear to be acute to subacute i
n nature. The area of decreased attenuation now involves more extensive area of the right middle cere
bral artery territory. There is slightly greater effacement to the right lateral ventricle. There is 
perhaps 1 to 2 mm of shift of midline structures to the left. No hemorrhage is seen. 

 

IMPRESSION:  

Increased size to the right middle cerebral artery infarct with slight increased mass effect. No evid
ence of hemorrhage. 

 

Findings telephoned at 2046 hours. 

 

POS: [facility]

## 2018-07-27 NOTE — PDOC.PN
- Subjective


Encounter Start Date: 07/27/18


Encounter Start Time: 15:49





Ms. Purdy was seen today in follow-up of malfunctioning PEG tube. This has 

been replaced. She is resting in bed, but is aphasic and can not express her 

concerns. She indicates she is not in pain.





- Objective


Resuscitation Status: 


 











Resuscitation Status           FULL:Full Resuscitation














MAR Reviewed: Yes


Vital Signs & Weight: 


 Vital Signs (12 hours)











  Temp Pulse Pulse Resp BP BP BP


 


 07/27/18 15:23  98.9 F  99   20   


 


 07/27/18 15:12        198/70 H


 


 07/27/18 15:09    99    235/96 H 


 


 07/27/18 13:45      190/70 H  


 


 07/27/18 12:40        188/62 H


 


 07/27/18 11:45  99.4 F  100   20   


 


 07/27/18 11:21   100    190/70 H  


 


 07/27/18 11:15        190/70 H


 


 07/27/18 10:10   100    198/78 H  


 


 07/27/18 09:42   100    208/74 H  


 


 07/27/18 08:30  100.3 F H  100   20    208/74 H


 


 07/27/18 07:57  100.3 F H  100   20   


 


 07/27/18 04:00  97.3 F L  89   19   














  Pulse Ox


 


 07/27/18 15:23  92 L


 


 07/27/18 15:12 


 


 07/27/18 15:09 


 


 07/27/18 13:45 


 


 07/27/18 12:40 


 


 07/27/18 11:45  92 L


 


 07/27/18 11:21 


 


 07/27/18 11:15 


 


 07/27/18 10:10 


 


 07/27/18 09:42 


 


 07/27/18 08:30  93 L


 


 07/27/18 07:57  93 L


 


 07/27/18 04:00  94 L








 Weight











Admit Weight                   209 lb 7.026 oz


 


Weight                         209 lb 7.026 oz














I&O: 


 











 07/26/18 07/27/18 07/28/18





 06:59 06:59 06:59


 


Intake Total  2300 


 


Output Total  4620 


 


Balance  -2320 











Result Diagrams: 


 07/27/18 05:08





 07/27/18 05:08


Additional Labs: 


 Accuchecks











  07/27/18 07/27/18 07/26/18





  10:50 05:55 22:06


 


POC Glucose  133 H  115 H  89














Phys Exam





- Physical Examination


HEENT: PERRLA


Respiratory: no wheezing


+ coarse breath sounds, and upper airway noise


Cardiovascular: RRR, no significant murmur, no rub


Gastrointestinal: soft, non-tender, positive bowel sounds


Musculoskeletal: no edema





Dx/Plan


(1) PEG tube malfunction


Code(s): K94.23 - GASTROSTOMY MALFUNCTION   Status: Acute   





(2) Acute CVA (cerebrovascular accident)


Code(s): I63.9 - CEREBRAL INFARCTION, UNSPECIFIED   Status: Acute   Comment: 

Dense left hemiplegia with large right MCA cva   





(3) CKD (chronic kidney disease) stage 4, GFR 15-29 ml/min


Code(s): N18.4 - CHRONIC KIDNEY DISEASE, STAGE 4 (SEVERE)   Status: Chronic   





(4) Diabetes


Code(s): E11.9 - TYPE 2 DIABETES MELLITUS WITHOUT COMPLICATIONS   Status: 

Chronic   


Qualifiers: 


   Diabetes mellitus type: type 2   Diabetes mellitus long term insulin use: 

without long term use   Diabetes mellitus complication status: with kidney 

complications   Diabetes mellitus complication detail: with chronic kidney 

disease   Chronic kidney disease stage: stage 4 (severe)   Qualified Code(s): 

E11.22 - Type 2 diabetes mellitus with diabetic chronic kidney disease; N18.4 - 

Chronic kidney disease, stage 4 (severe); N18.4 - Chronic kidney disease, stage 

4 (severe); N18.4 - Chronic kidney disease, stage 4 (severe); N18.4 - Chronic 

kidney disease, stage 4 (severe)   





(5) Hypertensive urgency


Code(s): I16.0 - HYPERTENSIVE URGENCY   Status: Resolved   





- Plan





* Cellulitis around old PEG site- continue Zosyn- renal dose


* HTN- she has very resistant blood pressure- will re-start her home 

medications with PRN' as needed


* DM- blood glucose is stable


* Mild Hypernatremia- will need to increase free water through the tube


* Recent CVA- will need to re-start aspirin and Plavix


                                                                               

                                                .

## 2018-07-27 NOTE — PRG
DATE OF SERVICE:  07/27/2018

 

Ms. Purdy is noncommunicative as she has been since her stroke.  I talked with her daughter, mendez hemphill to her what the findings were yesterday.  She confirms that at the nursing home the PEG came out
 when they were turning her and they brought her to [facility] for its replacement.  Last night in the O
R, Dr. Petersen debrided the PEG site abdominal wound and placed a wound VAC on that.  He replaced a PE
G tube.  He did not note any overt bleeding in the stomach.  I suspect the coffee-ground material com
ing out of the PEG site was from the tract.

 

PHYSICAL EXAMINATION: 

VITAL SIGNS:  Temperature is 99.4, T-max 100.3, blood pressure 190/70.

LUNGS:  Clear.

ABDOMEN:  Notable for a wound VAC in place over the previous PEG site and a new PEG tube is in place.
  There was some fresh blood there, no overt oozing or bleeding.  In  talking with the nurse, they ch
anged the gauze about twice today, but there has been no overt clot or heavy bleeding.  There are no 
signs of pus or purulent drainage or cellulitis of the skin.

 

LABORATORY STUDIES:  White count 9.9, hemoglobin 8, platelet count of 148,000.  Sodium 152, potassium
 4.5, BUN and creatinine are 92 and 3.1.  Liver function tests notable for AST of 45, alkaline phosph
atase 196, otherwise normal.

 

ASSESSMENT:  

1.  Stroke with oropharyngeal dysphagia.  

2.  Inflamed in the PEG tube site.  It seems that when she left here with the PEG we had placed thing
s were going well, but then it was inadvertently removed at the nursing home.  It was replaced at [facility]
 [facility].  When she arrived here the tract was deformed, dilated and drainage with a CAT scan showing air
 in the abdominal wall, but not into the abdominal cavity.  This has been debrided by Dr. Petersen last
 night.  She has been placed on Zosyn.  

3.  PEG site.  There was some oozing.  She is on Lovenox.

 

RECOMMENDATIONS:  

1.  Considering decreasing her dose of Lovenox as she has renal failure.

2.  Begin tube feeds and free water with elevated sodium.  Proceed carefully as she has had problems 
with residuals in the past.

## 2018-07-28 LAB
ANALYZER IN CARDIO: (no result)
ANION GAP SERPL CALC-SCNC: 16 MMOL/L (ref 10–20)
BASE EXCESS STD BLDA CALC-SCNC: -6.5 MEQ/L
BUN SERPL-MCNC: 85 MG/DL (ref 9.8–20.1)
CA-I BLDA-SCNC: 1.1 MMOL/L (ref 1.12–1.3)
CALCIUM SERPL-MCNC: 9 MG/DL (ref 7.8–10.44)
CHLORIDE SERPL-SCNC: 121 MMOL/L (ref 98–107)
CK MB SERPL-MCNC: 4.2 NG/ML (ref 0–6.6)
CK MB SERPL-MCNC: 4.5 NG/ML (ref 0–6.6)
CO2 SERPL-SCNC: 18 MMOL/L (ref 23–31)
COMM CRITICAL RESULTS DOC: (no result)
CREAT CL PREDICTED SERPL C-G-VRATE: 19 ML/MIN (ref 70–130)
GLUCOSE SERPL-MCNC: 208 MG/DL (ref 83–110)
HCO3 BLDA-SCNC: 17.5 MEQ/L (ref 22–28)
HCT VFR BLDA CALC: 21 % (ref 36–47)
HGB BLD-MCNC: 6.9 G/DL (ref 12–16)
HGB BLD-MCNC: 7.3 G/DL (ref 12–16)
HGB BLDA-MCNC: 7 G/DL (ref 12–16)
MCH RBC QN AUTO: 30.9 PG (ref 27–31)
MCV RBC AUTO: 87.1 FL (ref 78–98)
MDIFF COMPLETE?: YES
O2 A-A PPRESDIFF RESPIRATORY: 139.35 MM[HG] (ref 0–20)
PCO2 BLDA: 28.9 MMHG (ref 35–45)
PH BLDA: 7.4 [PH] (ref 7.35–7.45)
PLATELET # BLD AUTO: 139 THOU/UL (ref 130–400)
PLATELET BLD QL SMEAR: (no result)
PO2 BLDA: 81.2 MMHG (ref 70–?)
POTASSIUM BLD-SCNC: 4 MMOL/L (ref 3.7–5.3)
POTASSIUM SERPL-SCNC: 4.3 MMOL/L (ref 3.5–5.1)
RBC # BLD AUTO: 2.37 MILL/UL (ref 4.2–5.4)
SODIUM SERPL-SCNC: 151 MMOL/L (ref 136–145)
SPECIMEN DRAWN FROM PATIENT: (no result)
TROPONIN I SERPL DL<=0.01 NG/ML-MCNC: 0.87 NG/ML (ref ?–0.03)
TROPONIN I SERPL DL<=0.01 NG/ML-MCNC: 0.93 NG/ML (ref ?–0.03)
WBC # BLD AUTO: 11.2 THOU/UL (ref 4.8–10.8)

## 2018-07-28 PROCEDURE — 30233N1 TRANSFUSION OF NONAUTOLOGOUS RED BLOOD CELLS INTO PERIPHERAL VEIN, PERCUTANEOUS APPROACH: ICD-10-PCS | Performed by: INTERNAL MEDICINE

## 2018-07-28 RX ADMIN — NICARDIPINE HYDROCHLORIDE SCH MLS: 25 INJECTION INTRAVENOUS at 00:46

## 2018-07-28 RX ADMIN — INSULIN LISPRO PRN UNIT: 100 INJECTION, SOLUTION INTRAVENOUS; SUBCUTANEOUS at 16:05

## 2018-07-28 RX ADMIN — NICARDIPINE HYDROCHLORIDE SCH MLS: 25 INJECTION INTRAVENOUS at 08:19

## 2018-07-28 RX ADMIN — SODIUM BICARBONATE SCH MG: 325 TABLET ORAL at 14:13

## 2018-07-28 RX ADMIN — ALBUMIN HUMAN SCH GM: 250 SOLUTION INTRAVENOUS at 01:28

## 2018-07-28 RX ADMIN — SCOPALAMINE SCH MG: 1 PATCH, EXTENDED RELEASE TRANSDERMAL at 20:11

## 2018-07-28 RX ADMIN — SODIUM BICARBONATE SCH MG: 325 TABLET ORAL at 20:11

## 2018-07-28 RX ADMIN — ALBUMIN HUMAN SCH GM: 250 SOLUTION INTRAVENOUS at 05:35

## 2018-07-28 RX ADMIN — INSULIN LISPRO PRN UNIT: 100 INJECTION, SOLUTION INTRAVENOUS; SUBCUTANEOUS at 11:20

## 2018-07-28 RX ADMIN — Medication SCH ML: at 08:22

## 2018-07-28 RX ADMIN — Medication SCH ML: at 20:12

## 2018-07-28 RX ADMIN — NIFEDIPINE SCH MG: 60 TABLET, EXTENDED RELEASE ORAL at 08:21

## 2018-07-28 RX ADMIN — INSULIN GLARGINE SCH MLS: 100 INJECTION, SOLUTION SUBCUTANEOUS at 20:13

## 2018-07-28 RX ADMIN — PIPERACILLIN SODIUM, TAZOBACTAM SODIUM SCH MLS: 2; .25 INJECTION, POWDER, LYOPHILIZED, FOR SOLUTION INTRAVENOUS at 13:45

## 2018-07-28 RX ADMIN — PIPERACILLIN SODIUM, TAZOBACTAM SODIUM SCH MLS: 2; .25 INJECTION, POWDER, LYOPHILIZED, FOR SOLUTION INTRAVENOUS at 05:35

## 2018-07-28 RX ADMIN — PIPERACILLIN SODIUM, TAZOBACTAM SODIUM SCH MLS: 2; .25 INJECTION, POWDER, LYOPHILIZED, FOR SOLUTION INTRAVENOUS at 21:02

## 2018-07-28 RX ADMIN — NICARDIPINE HYDROCHLORIDE SCH MLS: 25 INJECTION INTRAVENOUS at 04:30

## 2018-07-28 RX ADMIN — SODIUM BICARBONATE SCH MG: 325 TABLET ORAL at 08:21

## 2018-07-28 NOTE — CON
DATE OF CONSULTATION:  07/28/2018

 

REFERRING PHYSICIAN:  Dr. Jose Luis Cash.

 

REASON FOR CONSULTATION:  New stroke.

 

HISTORY OF PRESENT ILLNESS:  Ms. Purdy is a pleasant 73-year-old -
American female, who has been consulted for evaluation of new stroke.  I am 
familiar with this patient from her prior admission on 07/11/2018, at which time
, she was noted to have acute right middle cerebral artery ischemic infarct 
from which she developed left hemiparesis, dysarthria, and dysphagia.  She 
required PEG tube placement.  She was discharged to rehab facility about 2 days 
ago.  She was brought back on 07/25/2018 after she was found to have leakage 
around her PEG tube site for which she required removal of the old PEG tube and 
placement of a new PEG tube.  Since being admitted to the hospital, she has 
continued to decline in her strength and alertness for which she had a CT head 
without contrast done and on the CT scan of the head that was done on 07/27/
2018 showed increased size to the right middle cerebral artery infarct for 
which I am being further asked to evaluate.  Patient's daughter-in-law is 
present at bedside who is a former ICU nurse.  She reports that patient is 
still able to follow simple commands and able to converse with them.  She is 
able to move her right side, but unable to move her left side.

 

PAST MEDICAL HISTORY, PAST SURGICAL HISTORY, FAMILY HISTORY, SOCIAL HISTORY, 
CURRENT MEDICATIONS, AND ALLERGIES:  Reviewed there as dictated H&P note done 
by Dr. Jean-Paul Petersen.

 

REVIEW OF SYSTEMS:  Unable to perform.

 

PHYSICAL EXAMINATION:

VITAL SIGNS:  Blood pressure of 144/50, pulse of 82, temperature of 98.2, 
respirations of 20, O2 sats of 93% on room air.

GENERAL:  Well-developed, well-nourished -American female, in no 
apparent distress.

RESPIRATORY:  Clear to auscultation bilaterally.

CARDIOVASCULAR:  Regular rate and rhythm.

NEUROLOGIC:  Mental status:  The patient is somewhat obtunded.  She does wake 
up to verbal stimuli.  She is able to state her first and last name.  She is 
able to state her age.  She is able to follow some simple commands.  Speech and 
language:  Severely dysarthric speech noted.  Cranial nerves:  Pupils are 3 mm 
and reactive.  Visual fields are full to threat.  External muscles are intact.  
No nystagmus is noted.  There is a left facial droop noted.  Tongue and uvula 
are midline.  Motor exam showed normal tone and bulk in the right upper and 
right lower extremities.  She has a flaccid left upper and left lower 
extremity.  Her strength in the left upper and left lower extremity is 0/5.  
She may have mild hemineglect on the left lower extremity.  Sensory:  
Diminished sensation in both upper and lower extremities.  Babinski:  Plantar 
responses extensor on the left, flexion on the right.  Deep tendon reflexes 1+ 
of flexion with upper extremities.

 

LABORATORY DATA:  Reviewed, which included CBC, CMP, urinalysis which is 
significant for WBC of 11.2, hemoglobin 7.3, hematocrit 20.7.  Sodium of 151, 
BUN of 85, creatinine of 3.82, glucose of 273, troponin of 0.866, BNP of 3169.4
, otherwise unremarkable.

 

IMAGING STUDIES:  CT head without contrast done on yesterday as well as done on 
07/11/2018 were reviewed and compared which showed increase in the total size 
of the right MCA stroke with mild mass effect and cerebral edema with resulting 
right to left midline shift of 1-2 mm.

 

IMPRESSION:

1.  Subacute right middle cerebral artery distribution ischemic infarct.

2.  Cerebral edema secondary to #1.

3.  Worsening left-sided weakness, due to #1.

 

ASSESSMENT AND PLAN:  Ms. Purdy is a pleasant 73-year-old -American 
female with recent right MCA stroke, presented with worsening confusion and 
worsening alertness and worsening of the right-sided weakness.  She did have a 
CT head without contrast which showed expansion of the previous stroke.  In my 
opinion, this is not a new stroke and just an extension of the previous stroke.
  My opinion, her symptoms of increased lethargy, confusion, and worsening left-
sided weakness are likely metabolic in nature.  I have discussed this with the 
patient's daughter in law and explained that the treatment plan does not change 
from the stroke standpoint.  At this time, I will recommend controlling 
underlying medical risk factors as well as continuing to control underlying 
medical management.  We will keep a close eye on neurological deterioration.

 

Thank you for consultation.

 

HAYLEE

## 2018-07-28 NOTE — PDOC.PN
- Subjective


Encounter Start Date: 07/28/18


Encounter Start Time: 07:55





Ms. Purdy has been moved to the ICU. The events of last night were noted. 

There was a Code green called due to concerns for a decrease in mental status. 

CT scan demonstrated some extension of her previous stroke. She is awake and 

alert. She is able to say a few words, but her voice is weak, and a bit 

slurred. Her respirations are labored. It was said that she was not moving her 

right side, but she is able to lift her right arm and leg, and squeeze my hand 

on command with her right hand.





- Objective


Resuscitation Status: 


 











Resuscitation Status           FULL:Full Resuscitation














MAR Reviewed: Yes


Vital Signs & Weight: 


 Vital Signs (12 hours)











  Temp Pulse Resp BP Pulse Ox


 


 07/28/18 07:00  98.2 F    


 


 07/28/18 04:00  98.4 F    


 


 07/28/18 00:15  98.9 F  99  22 H   95


 


 07/28/18 00:00  98.9 F    


 


 07/27/18 22:19   100   


 


 07/27/18 21:45  100.3 F H  100  22 H   90 L


 


 07/27/18 20:00  99.4 F  100  24 H  194/68 H  90 L








 Weight











Admit Weight                   194 lb 7.163 oz


 


Weight                         194 lb 7.163 oz











 Most Recent Monitor Data











Heart Rate from ECG            88


 


NIBP                           163/64


 


NIBP BP-Mean                   80


 


Respiration from ECG           19


 


SpO2                           91














I&O: 


 











 07/27/18 07/28/18 07/29/18





 06:59 06:59 06:59


 


Intake Total 2300 1339 0


 


Output Total 4620 2965 140


 


Balance -2320 -1626 -140











Result Diagrams: 


 07/28/18 05:04





 07/28/18 05:04


Additional Labs: 


 Accuchecks











  07/27/18 07/27/18 07/27/18





  21:42 19:57 16:50


 


POC Glucose  177 H  181 H  204 H














  07/27/18





  10:50


 


POC Glucose  133 H














Phys Exam





- Physical Examination


HEENT: PERRLA


+ rhonchi bilaterally, and coarse breath sounds


Cardiovascular: RRR, no significant murmur, no rub


Gastrointestinal: soft, positive bowel sounds


+ distended


Musculoskeletal: edema present


+ edematous extremities


Left Hemiparesis, + able to lift her right arm and leg, and squeeze my hand


when asked if she hurts, she is able to shake her head a mouth a few words.





Dx/Plan


(1) PEG tube malfunction


Code(s): K94.23 - GASTROSTOMY MALFUNCTION   Status: Acute   





(2) Acute CVA (cerebrovascular accident)


Code(s): I63.9 - CEREBRAL INFARCTION, UNSPECIFIED   Status: Acute   Comment: 

Dense left hemiplegia with large right MCA cva   





(3) CKD (chronic kidney disease) stage 4, GFR 15-29 ml/min


Code(s): N18.4 - CHRONIC KIDNEY DISEASE, STAGE 4 (SEVERE)   Status: Chronic   





(4) Diabetes


Code(s): E11.9 - TYPE 2 DIABETES MELLITUS WITHOUT COMPLICATIONS   Status: 

Chronic   


Qualifiers: 


   Diabetes mellitus type: type 2   Diabetes mellitus long term insulin use: 

without long term use   Diabetes mellitus complication status: with kidney 

complications   Diabetes mellitus complication detail: with chronic kidney 

disease   Chronic kidney disease stage: stage 4 (severe)   Qualified Code(s): 

E11.22 - Type 2 diabetes mellitus with diabetic chronic kidney disease; N18.4 - 

Chronic kidney disease, stage 4 (severe); N18.4 - Chronic kidney disease, stage 

4 (severe); N18.4 - Chronic kidney disease, stage 4 (severe); N18.4 - Chronic 

kidney disease, stage 4 (severe)   





(5) Hypertensive urgency


Code(s): I16.0 - HYPERTENSIVE URGENCY   Status: Resolved   





- Plan





* Acute CVA- with some extension of her stroke- Clinically she appears about 

the same Neurologically to me than yesterday, with the exception of her speech 

is a bit more dysarthric, and her right side may be slightly weaker.


* Hypertensive Urgency -She is now on a Cardene drip, and her blood pressure is 

better controlled


* PEG tube Malfunction, and dislodgement- She has a new PEG tube placed, The 

old site has a wound vac in place- will continue IV antbiotics, for the 

necrotizing infection around the old site. She is a risk for Peritonitis due to 

the dislodgement of the previous PEG tube


* Await the results of the CT scan of the abdomen


* She has been reported to have vomited yesterday, and now she is requiring 

additional Oxygen support will monitor


* DM-blood glucose is stable


* CKD stage 4- renal function is slightly higher than yesterday- will monitor- 

Nephrology following


* Will discuss with the patient's family- her prognosis is poor.








.

## 2018-07-28 NOTE — PDOC.EVN
Event Note





- Event Note


Event Note: 





I spoke with Marii this morning, the patient's daughter-in-law ( [phone])

.  She tells me she is the spokes person,for the family because she has more 

medical knowledge. She tells me her  is " right by her " during the 

conversation.I updated her on the events of last night. I explained to her the 

treatments which are being performed now. I also explained that due to the 

Large Stroke, and severe renal failure, Diabetes mellitus, infected dislodged 

PEG tube, elevated BNP, and troponins, distended abdomen, and risk of aspiration

, that her overall prognosis is very poor. I also explained to her that I 

reviewed her previous hospital stay, and at that time, even prior to the 

hospitalization, she was informed for the patient's poor prognosis, and risk 

for extension of her stroke.





She was very pleasant on the phone, and says that she explained this all to the 

family, and does remember that conversation on the previous admission, but sats 

that the family has decided that they want her to remain a FULL Code, and that 

they want everything possible done. She asked me about the tronin level, her 

renal function and her hemoglobin level. She would like more blood cultures done

, to see if there is new infection, and would like the elevated tronin's 

addressed. She also says she believes she will be talking with Dr. Leahy soon 

about dialysis.





ACP time 40 minutes.

## 2018-07-28 NOTE — PRG
DATE OF SERVICE:  07/28/2018

 

SERVICE:  Renal Medicine.

 

SUBJECTIVE:  Ms. Purdy is a 73-year-old black female, followed by Renal Service for her chronic re
nal failure from hypertensive nephropathy.  Blood pressure has been labile, and for that reason, she 
was transferred to the unit to start IV nicardipine.  Currently, it has been off.  She had also a men
ramu status change.  Neurological status - mentation was fluctuating.  A repeat CT scan was done on 07
/27/2018, and a comment was made that there might be increased size of the previous infarct.  However
, Neurology reevaluated the patient and the feeling is this is about the same size.

 

OBJECTIVE:

VITAL SIGNS:  Blood pressure is currently 159/60 with a heart rate of 83, respiratory rate 16, pulse 
ox 93%.

GENERAL EXAM:  Noted to have decreased mentation, but these are as one can follow my verbal commands 
and responds to my verbal stimuli.

HEENT:  Pinkish conjunctivae.  Anicteric sclerae.

NECK:  No neck mass, no carotid bruits, no JVD.

CHEST:  No deformities.

LUNGS:  Decreased breath sounds.

HEART:  Normal sinus rhythm.  No murmur, no gallops, no rubs.

ABDOMEN:  Globular, soft, nontender.  Positive for PD catheter.

EXTREMITIES:  No edema, no deformities.

 

MEDICATIONS:  07/28/2018 reviewed.

 

LABORATORY DATA:  Laboratories of 07/28/2018, white count 11.2, hemoglobin 7.3, sodium 151, potassium
 4.3, chloride 121, carbon dioxide 18, BUN 85, creatinine 3.82, glucose 208, calcium 9.0.

 

ASSESSMENT AND PLAN:

1.  Acute kidney injury/chronic renal failure, slightly higher creatinine 3.82.  Yesterday, this was 
noted at 3.85.  There may be some stabilization of the renal dysfunction.  Continue half normal salin
e.  There is no indication for any dialytic intervention with this patient.

2.  Hypertension, much improved with IV nicardipine.

3.  Anemia p.r.n. blood transfusion.

4.  Status post cerebrovascular accident.  Supportive care.  Neurology is following.  Family is dejuan
mplating palliative care.

## 2018-07-28 NOTE — CON
DATE OF CONSULTATION:  07/28/2018

 

SUBJECTIVE:  This is a 73-year-old -American female with a dense left hemiparesis, multiple me
dical problems.  Her PEG tube was dislodged after being discharged from the hospital, it was replaced
 by Dr. Petersen.

 

She is a FULL CODE.  Apparently, the nephrologist, Dr. Leahy has discussed with the family at length.

 

PAST MEDICAL HISTORY:  Extensive medical history is well outlined pertinent for hypertension, CVA, re
nal failure, diabetes, PEG in place, encephalopathy.

 

MEDICATIONS:  List of medicines from home includes multiple as outlined, Norvasc 10, aspirin, Coreg 2
5 b.i.d., Plavix 75, Lasix 40, insulin, ISMO 30, Arava 20 mg, Reglan as needed, minoxidil 10, pravast
atin 40, Catapres 0.2 three times a day, Glucotrol 5 mg twice a day, and hydralazine 25 three times a
 day.

 

ALLERGIES:  PRINIVIL.

 

TOBACCO:  None.

 

ALCOHOL:  None.

 

REVIEW OF SYSTEMS:  Otherwise unobtainable.

 

PHYSICAL EXAMINATION:

GENERAL:  Awake, responsive, has noticed _____ the left side.

VITAL SIGNS:  Blood pressure 150/87, she is on a Cardene drip.  Sats are 95% on 4 liters.  She is afe
brile.  Nurses tell me that she did aspirate.

CHEST:  Decreased breath sounds, no wheezing.

CARDIAC:  Normal S1 and S2.  No gallops.

ABDOMEN:  Soft, distended.

EXTREMITIES:  No edema.

 

LABORATORY DATA:  White count 11,000, H&H is 7 and 20, platelet count 139.  Sodium is 151, BUN and cr
eatinine are 85 and 3.82.  Troponin is elevated.  BNP is 3163.  Urine is growing Enterobacter aerugin
seb.

 

X-RAY FINDINGS:  Her chest x-ray did not show any acute infiltrates from last night.  CT of the brain
 shows previous right middle cerebral infarct.

 

IMPRESSION:

1.  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

2.  PEG in place.

3.  Renal failure.

4.  Hypertension.

5.  _____ imbalance.

 

PLAN:  Her blood pressure medicine has been adjusted by Nephrology.  Pulmonary will follow.  She is o
n Zosyn adjusted for renal failure.  Supportive care.  Family wants all supportive care.

 

This is a 70-minute consultation note in which 50% spent in direct patient care.

## 2018-07-28 NOTE — CON
DATE OF CONSULTATION:  07/28/2018

 

REASON FOR CONSULTATION:  Elevated troponin.

 

PRIMARY CARE PROVIDER:  Dr. Grayson.

 

HISTORY OF PRESENT ILLNESS:  Ms. Purdy is an unfortunate 73-year-old woman with no previous histor
y of _____ disease, who recently presented with CVA 3 weeks ago.  She was discharged to an outpatient
 facility.  She returned after her PEG tube was dislodged.  It was found in the peritoneum.  She is r
eceiving tube feeds through the PEG tube.  She recently was readmitted to the hospital or family felt
 she had a decrease in her mental status.  Code Maxwell was called.  CT scan showed extension of her re
cent CVA.  I have now been consulted for elevated troponin.  History cannot be obtained from the gino
ent.  No previous history of _____ coronary disease.

 

PAST MEDICAL HISTORY:  Recent stroke, diabetes mellitus, and hypertension.

 

SOCIAL HISTORY:  None.

 

ALCOHOL:  None.

 

HOME MEDICATIONS:  Include amlodipine, aspirin, Calcitriol, Coreg, isosorbide, clonidine, Plavix, met
oprolol, docusate, Reglan, minoxidil, scopolamine, Lasix, and glipizide.

 

REVIEW OF SYSTEMS:  Unobtainable.

 

PHYSICAL EXAMINATION:

VITAL SIGNS:  Blood pressure 159/60, pulse 83, and respirations 20.

NEUROLOGIC:  Right-sided weakness.  She is nonverbal and not awaken to voice.

HEENT:  Sclerae without icterus.  Mouth has moist mucous membranes with normal pallor.

NECK:  No JVD.  Carotid upstroke brisk.  No bruits bilaterally.

LUNGS:  Rhonchi, rales noted bilaterally.

BACK:  No scoliosis or kyphosis.

CARDIAC:  Regular rate and rhythm with normal S1 and S2.  No S3 or S4 noted.

No significant rubs, murmurs, thrills, or gallops noted throughout the

precordium.  PMI is not displaced.  There is no parasternal heave.

ABDOMEN:  Soft, nontender, nondistended.  No peritoneal signs present.

No hepatosplenomegaly.  No abnormal striae.

EXTREMITIES:  2+ femoral and 2+ dorsalis pedis pulses.  No cyanosis, clubbing, or edema.

SKIN:  No gross abnormalities.

 

PERTINENT LABS:  Hemoglobin 7.3, white blood cell count 11,000, platelet count of 139, creatinine 3.8
 with a GFR of 14.  Peak troponin 0.9 and is down trending.  Sodium 151.

 

IMPRESSION:

1.  Elevated troponin.

2.  Recent cerebrovascular accident.

3.  Chronic kidney disease, stage 4.

4.  Anemia.

5.  ? aspiration pneumonia.

6.  Recent dislodged PEG tube.

 

RECOMMENDATIONS:  I had a long discussion with the family about Ms. Purdy's cardiac status.  She c
ertainly had increase in her troponin, but at this point is unlikely to change her prognosis.  Last e
cho dated 07/12/2018 with LVEF 60% to 65%.  At this point, we would not recommend repeating her echo.
  Her troponin is likely multifactorial and related to recent CVA increased creatinine in addition to
 anemia.  She likely has demand ischemia.  She has no overt signs or symptoms of acute myocardial inf
arction.  I did explain to the family that even if she was having an acute myocardial infarction, wou
ld be certainly difficult to proceed with a more aggressive approach with angiography given a risk of
 causing renal failure and unlikely to change her quality of life and prognosis.  I tried percent padmini
listic expectation to the family.  At this point, I recommend aggressive blood pressure therapy.  She
 is currently on aspirin in addition to statin therapy and beta-blocker therapy.  She is also on Plav
ix.

## 2018-07-28 NOTE — CT
CT ABDOMEN AND PELVIS WITHOUT IV CONTRAST:

7/28/18

 

HISTORY: 

PEG tube malfunction. 

 

COMPARISON:  

7/25/18.

 

FINDINGS:  

This examination was performed on 7/27/18 at 2034 hours but is not being submitted for interpretation
 until 7/28/18 at 1551 hours. 

 

There are small bilateral pleural effusions which has mildly increased on the left compared to prior 
study. 

 

There has been interval removal of the prior gastrostomy tube with interval placement of a new gastro
stomy tube. The gastrostomy tube does traverse the most lateral aspect of the lateral segment of the 
left hepatic lobe, but the distal portion of the gastrostomy tube does appear to be within the stomac
h. 

 

There is a small to moderate amount of intraperitoneal free fluid which is overall similar to the stacia
or exam. Although no IV contrast is utilized for this exam, but no defined fluid collection is apprec
iated. 

 

The spleen, pancreas, and bilateral kidneys demonstrate a grossly normal nonenhanced CT appearance. T
here is mild thickening of each adrenal gland which is a stable finding. 

 

Bennett catheter is present in a decompressed urinary bladder. 

 

There are calcified uterine fibroids including large calcified uterine fibroid noted. 

 

There is residual contrast again noted within the colon. 

 

Vascular calcifications are seen in the abdominal aorta and involving the iliac arteries. 

 

Subcutaneous edema is again seen predominantly laterally and greater in the flank regions and gluteal
 regions. There has been no other significant interval change from the prior exam. 

 

IMPRESSION:  

1.      Interval replacement of gastrostomy tube. The current gastrostomy tube does traverse the most
 lateral aspect lateral segment of the left hepatic lobe but does appear to be within the stomach.

2.      Overall stable moderate amount of intraperitoneal free fluid. 

3.      This exam was performed without intravenous contrast, but no defined fluid collection is seen
 to suggest abscess collection based on this exam. 

4.      Tiny right and small left pleural effusions. Pleural fluid at the left lung base has mildly i
ncreased from the prior study. 

5.      Subcutaneous emphysema adipose layer right lower abdomen probably related to recent injection
. 

6.      No dilated loops of small bowel are seen. 

7.      Mild cardiomegaly. 

8.      Positioning of the new gastrostomy tube was discussed with Dr. Petersen on 7/28/18 at 1559 hour
s. 

 

POS: [facility]

## 2018-07-28 NOTE — PRG-2
MATTHEW [facility]ODER[facility] PROVIDER NOTES

 

DATE OF SERVICE:  07/27/2018

 

RESIDENT:  Baron Murray MD

 

EVENT DETAIL:  Resident responded to code green was called due to change in 
mental status.  The patient has a history of a recent CVA and was recently 
discharged to nursing home.  She had complication with her PEG tube and was 
brought back to the hospital due to PEG tube complications.  Initially, her 
blood pressure was in the 200 systolic.  When I arrived to the room, the 
patient would respond to commands, but was still making incomprehensible 
sounds.  Family at bedside stated the patient has significant left-sided 
deficits and is unable to move her left arm or leg.  The patient was moving her 
right arm and leg to command.  Orders for CBC, CMP, cardiac enzymes, ammonia 
level and lactic acid were given.  Additionally, a chest x-ray and CT of the 
brain were obtained.  Discussion was had with the on-call Sound Physician and 
decision was made to move the patient to the [facility].  Results from the 
laboratories are mentioned, show cardiac enzymes of 0.633 with a CK-MB of 4.5, 
creatinine of 3.85, which appears to be the patient's baseline this hospital 
stay and sodium of 152, which again appears to be chronic.

 

IMAGING:  CT report shows increased size to the right MCA infarct with slight 
increase in mass effect and no evidence of hemorrhage.  Chest x-ray was 
unremarkable.  Code was ended.  Further care to be directed by primary team.

 

HAYLEE

## 2018-07-29 LAB
ANION GAP SERPL CALC-SCNC: 17 MMOL/L (ref 10–20)
BASOPHILS # BLD AUTO: 0 THOU/UL (ref 0–0.2)
BASOPHILS NFR BLD AUTO: 0 % (ref 0–1)
BUN SERPL-MCNC: 78 MG/DL (ref 9.8–20.1)
CALCIUM SERPL-MCNC: 9.2 MG/DL (ref 7.8–10.44)
CHLORIDE SERPL-SCNC: 124 MMOL/L (ref 98–107)
CO2 SERPL-SCNC: 18 MMOL/L (ref 23–31)
CREAT CL PREDICTED SERPL C-G-VRATE: 19 ML/MIN (ref 70–130)
EOSINOPHIL # BLD AUTO: 0.1 THOU/UL (ref 0–0.7)
EOSINOPHIL NFR BLD AUTO: 1.1 % (ref 0–10)
GLUCOSE SERPL-MCNC: 161 MG/DL (ref 83–110)
HGB BLD-MCNC: 8.2 G/DL (ref 12–16)
HGB BLD-MCNC: 8.2 G/DL (ref 12–16)
HGB BLD-MCNC: 8.5 G/DL (ref 12–16)
INR PPP: 1.5
LYMPHOCYTES # BLD: 0.8 THOU/UL (ref 1.2–3.4)
LYMPHOCYTES NFR BLD AUTO: 7.2 % (ref 21–51)
MCH RBC QN AUTO: 29.8 PG (ref 27–31)
MCV RBC AUTO: 89.4 FL (ref 78–98)
MDIFF COMPLETE?: YES
MICROCYTES BLD QL SMEAR: (no result) (100X)
MONOCYTES # BLD AUTO: 0.3 THOU/UL (ref 0.11–0.59)
MONOCYTES NFR BLD AUTO: 2.2 % (ref 0–10)
NEUTROPHILS # BLD AUTO: 10.1 THOU/UL (ref 1.4–6.5)
NEUTROPHILS NFR BLD AUTO: 89.4 % (ref 42–75)
PLATELET # BLD AUTO: 113 THOU/UL (ref 130–400)
PLATELET BLD QL SMEAR: (no result)
POLYCHROMASIA BLD QL SMEAR: (no result) (100X)
POTASSIUM SERPL-SCNC: 4 MMOL/L (ref 3.5–5.1)
PROTHROMBIN TIME: 18.2 SEC (ref 12–14.7)
RBC # BLD AUTO: 2.75 MILL/UL (ref 4.2–5.4)
SODIUM SERPL-SCNC: 155 MMOL/L (ref 136–145)
WBC # BLD AUTO: 11.3 THOU/UL (ref 4.8–10.8)

## 2018-07-29 RX ADMIN — PIPERACILLIN SODIUM, TAZOBACTAM SODIUM SCH MLS: 2; .25 INJECTION, POWDER, LYOPHILIZED, FOR SOLUTION INTRAVENOUS at 21:12

## 2018-07-29 RX ADMIN — ERYTHROPOIETIN SCH UNITS: 20000 INJECTION, SOLUTION INTRAVENOUS; SUBCUTANEOUS at 13:44

## 2018-07-29 RX ADMIN — DEXTROSE AND SODIUM CHLORIDE SCH MLS: 5; 200 INJECTION, SOLUTION INTRAVENOUS at 18:05

## 2018-07-29 RX ADMIN — INSULIN LISPRO PRN UNIT: 100 INJECTION, SOLUTION INTRAVENOUS; SUBCUTANEOUS at 11:16

## 2018-07-29 RX ADMIN — SODIUM BICARBONATE SCH MG: 325 TABLET ORAL at 08:16

## 2018-07-29 RX ADMIN — INSULIN LISPRO PRN UNIT: 100 INJECTION, SOLUTION INTRAVENOUS; SUBCUTANEOUS at 15:43

## 2018-07-29 RX ADMIN — Medication SCH ML: at 20:17

## 2018-07-29 RX ADMIN — SODIUM BICARBONATE SCH MG: 325 TABLET ORAL at 13:43

## 2018-07-29 RX ADMIN — NIFEDIPINE SCH MG: 60 TABLET, EXTENDED RELEASE ORAL at 08:15

## 2018-07-29 RX ADMIN — PIPERACILLIN SODIUM, TAZOBACTAM SODIUM SCH MLS: 2; .25 INJECTION, POWDER, LYOPHILIZED, FOR SOLUTION INTRAVENOUS at 13:43

## 2018-07-29 RX ADMIN — DEXTROSE AND SODIUM CHLORIDE SCH MLS: 5; 200 INJECTION, SOLUTION INTRAVENOUS at 11:15

## 2018-07-29 RX ADMIN — SODIUM BICARBONATE SCH MG: 325 TABLET ORAL at 20:16

## 2018-07-29 RX ADMIN — INSULIN GLARGINE SCH MLS: 100 INJECTION, SOLUTION SUBCUTANEOUS at 20:17

## 2018-07-29 RX ADMIN — Medication SCH ML: at 08:15

## 2018-07-29 RX ADMIN — PIPERACILLIN SODIUM, TAZOBACTAM SODIUM SCH MLS: 2; .25 INJECTION, POWDER, LYOPHILIZED, FOR SOLUTION INTRAVENOUS at 05:12

## 2018-07-29 NOTE — PDOC.PN
- Subjective


Encounter Start Date: 07/29/18


Encounter Start Time: 09:03





Ms. Purdy was seen today in follow-up of recent dislodged PEG tube. She is 

sleeping now. I am told she will awaken periodically and will speak a few 

words. She is still moving the right side.





- Objective


Resuscitation Status: 


 











Resuscitation Status           FULL:Full Resuscitation














MAR Reviewed: Yes


Vital Signs & Weight: 


 Vital Signs (12 hours)











  Temp Pulse Pulse Resp BP BP Pulse Ox


 


 07/29/18 08:16   87    171/68 H  


 


 07/29/18 08:15   87    171/68 H  


 


 07/29/18 08:00  98.7 F      


 


 07/29/18 07:36  98.7 F  87   24 H    98


 


 07/29/18 07:00  98.7 F      


 


 07/29/18 05:12   85    197/72 H  


 


 07/29/18 03:45  98.1 F      


 


 07/29/18 03:00  98.1 F      


 


 07/29/18 00:00  98.2 F      


 


 07/28/18 23:41  98.3 F   80  22 H   166/59 H 








 Weight











Admit Weight                   194 lb 7.163 oz


 


Weight                         195 lb 5.273 oz











 Most Recent Monitor Data











Heart Rate from ECG            84


 


NIBP                           171/68


 


NIBP BP-Mean                   140


 


Respiration from ECG           21


 


SpO2                           99














I&O: 


 











 07/28/18 07/29/18 07/30/18





 06:59 06:59 06:59


 


Intake Total 1339 1916 


 


Output Total 2965 1240 160


 


Balance -1626 676 -160











Result Diagrams: 


 07/29/18 06:24





 07/29/18 06:21


Additional Labs: 


 Accuchecks











  07/29/18 07/28/18 07/28/18





  06:29 22:28 16:05


 


POC Glucose  149 H  176 H  212 H














  07/28/18





  11:18


 


POC Glucose  273 H














Phys Exam





- Physical Examination


HEENT: PERRLA


+ rhonchi bilaterally and some upper airway noise.


Cardiovascular: RRR, no significant murmur, no rub


Gastrointestinal: soft, non-tender, positive bowel sounds


+ mild distention, tympanic to percussion


Musculoskeletal: edema present


+ generalized edema in the upper extremities


+ Dysarthria, and dysphagia,Left hemiparesis


+ right sided weakness





Dx/Plan


(1) PEG tube malfunction


Code(s): K94.23 - GASTROSTOMY MALFUNCTION   Status: Acute   





(2) Acute CVA (cerebrovascular accident)


Code(s): I63.9 - CEREBRAL INFARCTION, UNSPECIFIED   Status: Acute   Comment: 

Dense left hemiplegia with large right MCA cva   





(3) CKD (chronic kidney disease) stage 4, GFR 15-29 ml/min


Code(s): N18.4 - CHRONIC KIDNEY DISEASE, STAGE 4 (SEVERE)   Status: Chronic   





(4) Diabetes


Code(s): E11.9 - TYPE 2 DIABETES MELLITUS WITHOUT COMPLICATIONS   Status: 

Chronic   


Qualifiers: 


   Diabetes mellitus type: type 2   Diabetes mellitus long term insulin use: 

without long term use   Diabetes mellitus complication status: with kidney 

complications   Diabetes mellitus complication detail: with chronic kidney 

disease   Chronic kidney disease stage: stage 4 (severe)   Qualified Code(s): 

E11.22 - Type 2 diabetes mellitus with diabetic chronic kidney disease; N18.4 - 

Chronic kidney disease, stage 4 (severe); N18.4 - Chronic kidney disease, stage 

4 (severe); N18.4 - Chronic kidney disease, stage 4 (severe); N18.4 - Chronic 

kidney disease, stage 4 (severe)   





(5) Hypertensive urgency


Code(s): I16.0 - HYPERTENSIVE URGENCY   Status: Resolved   





- Plan





* Hypertensivve Urgency- better- she is now off the Cardene drip, and her blood 

pressure now is 131/51


* Continue her home medications via the PEG tube


* CT scan results of the abdomen were noted, and the family has been notified 

of the results


* Anemia- hemoglobin has improved after transfusion


* DM- Continue Lantus insulin as well as a SSI.


* CKD- she has severe renal disease- stage 4- Nephrologist is monitoring- no 

dialysis indication at this time


* CVA- continue aspirin and Plavix, and statin- resume PT/OT when appropriate


* Aggressive respiratory care


* Prognosis continues to be guarded

## 2018-07-29 NOTE — RAD
PORTABLE AP CHEST XRAY:

 

DATE: 7/29/18.

 

HISTORY: 

On ventilator.  Followup evaluation.

 

COMPARISON: 

7/27/18.

 

FINDINGS: 

There is a suggestion of small bilateral pleural effusions.  There is minimal linear patchy density a
t the right lung base which is accentuated due to patient rotation but could be related to either ate
lectasis or developing pneumonia or aspiration pneumonitis.  Pulmonary vasculature is borderline incr
eased.  The cardiac silhouette is within normal limits.  Osseous structures appear intact.

 

IMPRESSION: 

1.  Tiny bilateral pleural effusions.

 

2.  Interstitial and patchy density right lung base which may be related to atelectasis, although asp
iration pneumonitis or pneumonia is a possibility.  Continued followup is recommended.

 

POS: [facility]

## 2018-07-29 NOTE — PDOC.CTH
Cardiology Progress Note





- Subjective





No changes overnight.  Pt received one unit of PPRBC





- Objective


 Vital Signs











  Temp Pulse Pulse Resp BP BP Pulse Ox


 


 07/29/18 07:00  98.7 F      


 


 07/29/18 05:12   85    197/72 H  


 


 07/29/18 03:45  98.1 F      


 


 07/29/18 03:00  98.1 F      


 


 07/29/18 00:00  98.2 F      


 


 07/28/18 23:41  98.3 F   80  22 H   166/59 H 


 


 07/28/18 20:12   85    169/66 H  


 


 07/28/18 20:00  98.1 F  85   18    97








 











Admit Weight                   194 lb 7.163 oz


 


Weight                         195 lb 5.273 oz














 











 07/28/18 07/29/18 07/30/18





 06:59 06:59 06:59


 


Intake Total 1339 1916 


 


Output Total 2965 1240 120


 


Balance -1626 676 -120














- Physical Examination


Neck: carotid US brisk, no JVD present


Lungs: unlabored respirations, other: (rhonchi, rales bilaterally)


Heart: RRR


Abdomen: soft


Extremities: + femoral B





- Labs


Result Diagrams: 


 07/29/18 06:24





 07/29/18 06:21


 Troponin/CKMB











CK-MB (CK-2)  4.2 ng/mL (0-6.6)   07/28/18  05:04    


 


Troponin I  0.866 ng/mL (< 0.028)  H*  07/28/18  05:04    














- Assessment/Plan





Elevated troponin


CVA (extension of previous CVA)


Renal failure


profound anemia


HTN


Aspiration


peg tube dislodgment





From a CV standpoint, recommend conservative treatment


BP management


prognosis appears poor


one unit of PRBC given

## 2018-07-29 NOTE — PRG
DATE OF SERVICE:  07/29/2018

 

SUBJECTIVE:  A 73-year-old female, encephalopathic, obtunded, _____ response.

 

PHYSICAL EXAMINATION:

VITAL SIGNS:  Blood pressure is 171/65, pulse 85, respirations 26, sats 90%.

NEUROLOGIC:  As noted, encephalopathic.

CHEST:  Bilateral rhonchi.

CARDIAC:  Normal S1 and S2, no gallops.

ABDOMEN:  Soft, no masses.

 

IMAGING DATA AND LABORATORY DATA:  Chest x-ray shows bilateral small pleural effusion, cephalization.
  Her creatinine 3.6, BUN is 78, glucose 149.  Urine is growing Enterococcus.

 

IMPRESSION:

1.  Status post revision of the percutaneous endoscopic gastrostomy.  Apparently, the percutaneous en
doscopic gastrostomy tube is going through the lobe of the liver.

2.  History of cerebrovascular accident.

3.  Chronic renal failure.

4.  History of coronary artery disease.

5.  History of congestive heart failure.

 

PLAN:  Continue present treatment.  Dialysis as per Nephrology.  Empiric antibiotics for presumed asp
iration.

 

Supportive care.

 

Prognosis is grave.  Family wants continued full supportive care.

## 2018-07-29 NOTE — PRG
DATE OF SERVICE:  07/29/2018

 

RENAL MEDICINE

 

SUBJECTIVE:  Ms. Purdy is a 73-year-old black female with chronic renal failure and being followed
 by the Renal Service for her renal dysfunction.  She recently was diagnosed to have CVA with a large
 infarct noted.  Mentation has been fluctuating.  Renal function has also been fluctuating.  Due to h
er hyponatremia, she is currently on D5 half normal saline.

 

No acute events noted.  PEG tube was reinserted recently.

 

The patient's family has consulted with palliative care.  They still want to proceed with everything.


 

PHYSICAL EXAMINATION:

VITAL SIGNS:  Blood pressure is 131/51, heart rate is 79, respiratory 23, pulse ox 97%.

GENERAL:  The patient has depressed mentation.  Occasionally has intermittent response to verbal stim
jocelyn.

HEENT:  She has has slightly pale conjunctivae, anicteric sclerae.

NECK:  No neck mass, no carotid bruits, no JVD.

CHEST:  No deformities.

LUNGS:  Clear breath sounds.

HEART:  Normal sinus rhythm.  No murmurs, no gallops,  no rubs.

ABDOMEN:  Globular, soft, nontender, no masses.

EXTREMITIES:  No edema, no deformities.  Positive for PEG tube.

 

MEDICATIONS:  Medications of 07/19/2018 reviewed.

 

LABORATORY DATA:  Laboratories of 07/19/2018; white count 11.2 hemoglobin 8.2.  Sodium 155, potassium
 4, chloride 124, carbon dioxide 18, BUN 78, creatinine 3.67, glucose 161, calcium 9.2.

 

ASSESSMENT AND PLAN:

1.  Acute kidney injury/chronic renal failure, renal function stable.  As a matter of fact, creatinin
e improved from 3.8 to a most recent value of 3.67.  GFR is 15 mL per minute.  There is no indication
 for any dialytic intervention with this patient.

2.  Hypernatremia - we will change IV fluid to D5 0.25% sodium chloride and run at 125 mL per hour.

3.  Status post cerebrovascular accident.  Continue supportive care.

4.  Anemia, p.r.n. blood transfusion.  We will start patient on Epogen since the blood pressure is be
tter controlled - 7,500 units subcutaneously every week.

5.  Labile hypertension, much improved.  Continue current type hypertensive regimen.

## 2018-07-30 LAB
ANION GAP SERPL CALC-SCNC: 14 MMOL/L (ref 10–20)
BASOPHILS # BLD AUTO: 0 THOU/UL (ref 0–0.2)
BASOPHILS NFR BLD AUTO: 0.1 % (ref 0–1)
BUN SERPL-MCNC: 71 MG/DL (ref 9.8–20.1)
CALCIUM SERPL-MCNC: 8.6 MG/DL (ref 7.8–10.44)
CHLORIDE SERPL-SCNC: 119 MMOL/L (ref 98–107)
CO2 SERPL-SCNC: 18 MMOL/L (ref 23–31)
CREAT CL PREDICTED SERPL C-G-VRATE: 20 ML/MIN (ref 70–130)
EOSINOPHIL # BLD AUTO: 0.2 THOU/UL (ref 0–0.7)
EOSINOPHIL NFR BLD AUTO: 1.9 % (ref 0–10)
GLUCOSE SERPL-MCNC: 200 MG/DL (ref 83–110)
HGB BLD-MCNC: 8.2 G/DL (ref 12–16)
LYMPHOCYTES # BLD: 1.1 THOU/UL (ref 1.2–3.4)
LYMPHOCYTES NFR BLD AUTO: 12.4 % (ref 21–51)
MCH RBC QN AUTO: 30.9 PG (ref 27–31)
MCV RBC AUTO: 88.7 FL (ref 78–98)
MONOCYTES # BLD AUTO: 0.4 THOU/UL (ref 0.11–0.59)
MONOCYTES NFR BLD AUTO: 4.6 % (ref 0–10)
NEUTROPHILS # BLD AUTO: 6.8 THOU/UL (ref 1.4–6.5)
NEUTROPHILS NFR BLD AUTO: 81.1 % (ref 42–75)
PLATELET # BLD AUTO: 96 THOU/UL (ref 130–400)
POTASSIUM SERPL-SCNC: 3.6 MMOL/L (ref 3.5–5.1)
RBC # BLD AUTO: 2.65 MILL/UL (ref 4.2–5.4)
SODIUM SERPL-SCNC: 147 MMOL/L (ref 136–145)
WBC # BLD AUTO: 8.4 THOU/UL (ref 4.8–10.8)

## 2018-07-30 RX ADMIN — Medication SCH ML: at 22:43

## 2018-07-30 RX ADMIN — INSULIN LISPRO PRN UNIT: 100 INJECTION, SOLUTION INTRAVENOUS; SUBCUTANEOUS at 16:51

## 2018-07-30 RX ADMIN — INSULIN LISPRO PRN UNIT: 100 INJECTION, SOLUTION INTRAVENOUS; SUBCUTANEOUS at 22:52

## 2018-07-30 RX ADMIN — SODIUM BICARBONATE SCH: 325 TABLET ORAL at 22:43

## 2018-07-30 RX ADMIN — INSULIN LISPRO PRN UNIT: 100 INJECTION, SOLUTION INTRAVENOUS; SUBCUTANEOUS at 06:10

## 2018-07-30 RX ADMIN — DEXTROSE AND SODIUM CHLORIDE SCH: 5; 200 INJECTION, SOLUTION INTRAVENOUS at 02:08

## 2018-07-30 RX ADMIN — DEXTROSE AND SODIUM CHLORIDE SCH MLS: 5; 200 INJECTION, SOLUTION INTRAVENOUS at 12:04

## 2018-07-30 RX ADMIN — INSULIN LISPRO PRN UNIT: 100 INJECTION, SOLUTION INTRAVENOUS; SUBCUTANEOUS at 11:54

## 2018-07-30 RX ADMIN — PIPERACILLIN SODIUM, TAZOBACTAM SODIUM SCH MLS: 2; .25 INJECTION, POWDER, LYOPHILIZED, FOR SOLUTION INTRAVENOUS at 05:12

## 2018-07-30 RX ADMIN — DEXTROSE AND SODIUM CHLORIDE SCH MLS: 5; 200 INJECTION, SOLUTION INTRAVENOUS at 03:26

## 2018-07-30 RX ADMIN — PIPERACILLIN SODIUM, TAZOBACTAM SODIUM SCH MLS: 2; .25 INJECTION, POWDER, LYOPHILIZED, FOR SOLUTION INTRAVENOUS at 13:53

## 2018-07-30 RX ADMIN — PIPERACILLIN SODIUM, TAZOBACTAM SODIUM SCH MLS: 2; .25 INJECTION, POWDER, LYOPHILIZED, FOR SOLUTION INTRAVENOUS at 22:44

## 2018-07-30 RX ADMIN — INSULIN GLARGINE SCH MLS: 100 INJECTION, SOLUTION SUBCUTANEOUS at 22:41

## 2018-07-30 RX ADMIN — Medication SCH ML: at 10:41

## 2018-07-30 RX ADMIN — SODIUM BICARBONATE SCH MG: 325 TABLET ORAL at 14:48

## 2018-07-30 RX ADMIN — SODIUM BICARBONATE SCH MG: 325 TABLET ORAL at 10:35

## 2018-07-30 RX ADMIN — NIFEDIPINE SCH: 60 TABLET, EXTENDED RELEASE ORAL at 10:39

## 2018-07-30 NOTE — PRG
DATE OF SERVICE:  07/30/2018

 

SUBJECTIVE:  The patient adds nothing subjective.  The nursing personnel informed me that the PEG tub
e has high residuals and difficult to use.

 

OBJECTIVE:

VITAL SIGNS:  Pulse is 76, respiratory rate 13, blood pressure 160/53, temperature is 98.5.

CHEST:  Clear.

CARDIOVASCULAR:  Regular rate and rhythm.

ABDOMEN:  Soft, nontender.  PEG tube is noted.  Wound VAC is on a previous PEG site.

 

ASSESSMENT:

1.  Previous _____ PEG with large wound status post wound VAC.

2.  High gastric residuals through the left hepatic lobe.

 

RECOMMENDATIONS:  May try a nasogastric tube and see if tube feedings can proceed that route.  Other 
possibilities would be to remove the existing PEG and place a jejunal tube.  The third option would b
e to place a Dobbhoff tube into the third portion of the duodenum.  I think this probably would be th
e best bet.

## 2018-07-30 NOTE — EKG
Test Reason : STAT

Blood Pressure : ***/*** mmHG

Vent. Rate : 088 BPM     Atrial Rate : 088 BPM

   P-R Int : 132 ms          QRS Dur : 080 ms

    QT Int : 396 ms       P-R-T Axes : 062 024 186 degrees

   QTc Int : 479 ms

 

Normal sinus rhythm

Septal infarct , age undetermined



Abnormal ECG

When compared with ECG of 11-JUL-2018 08:23, (Unconfirmed)

ST no longer depressed in Anterior leads

Confirmed by TI VALADEZ,  SJunior ([id number]) on 7/30/2018 1:33:37 PM

 

Referred By:             Confirmed By:DR. JESIKA LUKE MD

## 2018-07-30 NOTE — PRG
DATE OF SERVICE:  07/30/2018

 

The patient is doing about the same according to what I have read in the notes and discussed with [facility] staff.  It does not appear that she has a clearcut indication for CCU other than the family comp
lains about the care out on the floor.

 

PHYSICAL EXAMINATION:

VITAL SIGNS:  Temperature is 98.0, pulse 73, blood pressure 158/49, O2 sat 97%, respiratory rate 14. 
 Total intake for the last 24 hours 3280, output 1190.

NEUROLOGIC:  Remarkable for left-sided hemiplegia.  She is able to vocalize better than the last time
 I saw her about 3 weeks ago.

HEENT:  Otherwise unremarkable.

NECK:  No JVD.

LUNGS:  Clear.

CARDIAC:  S1 and S2 regular.

ABDOMEN:  She has a midline wound VAC.  She also has a PEG tube in the upper part of her abdomen.  Sh
roberto has generalized edema throughout.

 

LABORATORY DATA:  Sodium 147, potassium 3.6, chloride 119, CO2 18, BUN 71, creatinine 3.5, glucose 20
0.  White blood cell count 8.4, hematocrit 24.5, platelet count 96.  

 

X-ray shows atelectasis at the bases, some perihilar edema.

 

ASSESSMENT:

1.  This patient has a large right middle cerebral artery infarct with extension and severe debilitat
ion.

2.  Chronic renal failure.

3.  Hypernatremia at the time of admission, which has improved with the 1/4 normal saline administrat
ion.

4.  History of congestive heart failure.

5.  Question of aspiration pneumonia.

 

PLAN:

1.  From my standpoint, she is stable for transfer to the floor, although there may be some family is
sues that would need to be addressed.  

2.  Continue 1/4 normal saline.

3.  Continue IV Zosyn, stop at 5-7 days.

4.  Continue family education.  

 

This patient's prognosis is dismal.

## 2018-07-30 NOTE — PRG
DATE OF SERVICE:  07/29/2018

 

SUBJECTIVE:  Ms. Purdy remains in the ICU.  She communicates little bit.  Her mental status comes 
and goes.

 

OBJECTIVE:

VITAL SIGNS:  Blood pressure 114/46, pulse 83, temperature 98.7.  Urine output yesterday 1240.

GENERAL:  She is _____ she is okay.  She nods her head yes.

HEENT:  Oropharynx without lesions.

ABDOMEN:  Soft.  The PEG tube site is no bleeding.  The wound VAC dressing is intact, little bit prot
uberant.

 

LABORATORY STUDIES:  White count 11.3, hemoglobin 8.2, platelet count 113, neutrophils 89.  Bands non
e recorded.  INR is 1.5.  Sodium 135, potassium 4, chloride 124, bicarb 18, anion gap 13, BUN and cre
atinine are 78 and 3.67.  Cultures negative at 48 hours in blood.

 

ASSESSMENT:

1.  Status post cerebrovascular accident.

2.  Hypernatremia.  IV fluids have been trained to 0.25 normal saline 125 an hour.  We will go ahead 
and try to use the PEG tube for some free water.

3.  Kidney failure, acute on chronic.

4.  Anemia.  After transfusion, her blood count up, we will go and check that again today.

5.  PEG tube complications with it, apparently being removed at the nursing home and attempt to be pl
aced in an outside facility resulting abdominal wound that had be addressed surgically.  At that time
, has been noted the PEG tube was placed in the left margin of the left lobe of the liver.  There are
 no signs of bleeding on CAT scan.

 

PLAN:

1.  Monitor H&H q.8 hours for any signs of bleeding.

2.  Trial of free water via the PEG in light of hyponatremia.  If she tolerates this, we can retry st
art tube feeds at a low rate.

## 2018-07-30 NOTE — PRG
DATE OF SERVICE:  07/30/2018

 

SUBJECTIVE:  Ms. Purdy is a 73-year-old black female with known history of chronic renal failure a
nd being followed up for his acute renal dysfunction as well as chronic renal failure.  Recently, polo linton is status post CVA.  She was also noted to be hypernatremic and IV fluids has been changed at th
e present time.  No acute events noted.

 

OBJECTIVE:

VITAL SIGNS:  Blood pressure is 158/49 with a heart rate of 73, respiratory rate 14, pulse ox 97%.

GENERAL:  Lethargic and responsive to verbal stimuli, not in distress

HEENT:  She has slightly pale conjunctivae, anicteric sclerae.

NECK:  No neck mass, no carotid bruits, no JVD.

CHEST:  No deformities.

LUNGS:  Decreased breath sounds.

HEART:  Normal sinus rhythm.  No murmur, no gallops, no rubs.

ABDOMEN:  Globular, soft, nontender.  Positive for PEG tube.

EXTREMITIES:  No edema, no deformities.

 

MEDICATIONS:  Of 07/30/2018 reviewed.

 

LABORATORY DATA:  07/30/2018, white count 8.4, hemoglobin 8.2, hematocrit 23.5.  Sodium 147, potassiu
m 3.6, chloride 119, carbon dioxide 18, BUN 71, creatinine 3.53, glucose 200, calcium 8.6.

 

ASSESSMENT AND PLAN:

1.  Chronic renal failure, stabilizing renal function.  Continue supportive care.  No indication for 
any dialytic intervention.

2.  Hypernatremia, slowly improving with D5 0.25% sodium chloride.  The plan is to continue current m
anagement.

3.  Anemia.  The patient has been resumed on weekly Epogen.

4.  Labile hypertension, much improved.  Continue current blood pressure meds.  Overall, prognosis re
diogo poor with this patient.  Continue supportive care.

## 2018-07-30 NOTE — PDOC.CTH
Cardiology Progress Note





- Subjective





Pt answering questions and following commands today





- Objective


 Vital Signs











  Temp Pulse Resp BP Pulse Ox


 


 07/30/18 05:12     174/67 H 


 


 07/30/18 03:26     178/57 H 


 


 07/30/18 03:00  98 F    


 


 07/30/18 00:11   87   184/69 H 


 


 07/29/18 23:00  98.6 F    


 


 07/29/18 20:15   87   184/56 H 


 


 07/29/18 20:00  98.6 F  80  18   97


 


 07/29/18 19:00  98.1 F    








 











Admit Weight                   194 lb 7.163 oz


 


Weight                         196 lb 3.382 oz














 











 07/28/18 07/29/18 07/30/18





 06:59 06:59 06:59


 


Intake Total 1339 1916 3280


 


Output Total 2965 1240 1190


 


Balance -2052 066 9420














- Physical Examination


Neck: no JVD present


Lungs: other: (rhonchi, rales)


Heart: RRR


Abdomen: NT/ND, soft


Extremities: + femoral B





- Labs


Result Diagrams: 


 07/30/18 03:24





 07/30/18 03:24


 Troponin/CKMB











CK-MB (CK-2)  4.2 ng/mL (0-6.6)   07/28/18  05:04    


 


Troponin I  0.866 ng/mL (< 0.028)  H*  07/28/18  05:04    














- Assessment/Plan





Elevated troponin


CVA (extension of previous CVA)


Renal failure


profound anemia


HTN


Aspiration


peg tube dislodgment





Improving


Hb stable.


No new CV changes present


Spoke with daughter in law yesterday


No new CV recommendations at this time


An aggressive CV approach at this point will not change pts outcome

## 2018-07-30 NOTE — RAD
PORTABLE SUPINE CHEST 1 VIEW:

 

Date:  07/30/18 

 

HISTORY:  

73-year-old female with history of respiratory insufficiency. 

 

COMPARISON:  

07/29/18. 

 

FINDINGS:

Cardiomegaly with bilateral vascular congestion and interstitial and alveolar pulmonary edema and char
ateral pleural effusions. Findings have worsened when compared to the prior 07/27/18 study. 

 

IMPRESSION: 

Worsening vascular congestion, edema, and pleural effusions, with cardiomegaly. Findings are evidence
 for congestive heart failure. Continue follow-up for clearing or stability. 

 

 

POS: [facility]

## 2018-07-30 NOTE — PRG
DATE OF SERVICE:  07/28/2018

 

SUBJECTIVE:  Ms. Purdy last evening had a code green and was moved to the ICU.  Apparently, she ha
d elevated blood pressure and some mental status change.  She was given nicardipine IV and transferre
d here.  She also was felt to have a tight abdomen and was not tolerating tube feeds.  Brain CAT scan
 indicates increased size of the right middle cerebral artery infarct with slight increased mass effe
ct.  CT scan of the abdomen and pelvis showed the PEG tube that had been replaced, traversed in the m
ost lateral aspect, segment of the left hepatic lobe of the liver and in the stomach.  The amount of 
intraperitoneal fluid from previous scan apparently is stable.  There is no defined fluid collection 
in the abdominal wall or around the area of the liver where the PEG is and I have reviewed those film
s with the radiologist, small effusions, subcutaneous emphysema across the right lower abdomen relate
d to recent injection with mild cardiomyopathy.  Since arrival in the unit, the patient's altered men
ramu status is up and down, the patient is without complaints.  I asked her how she is feeling, she sa
ys she is okay.

 

PHYSICAL EXAMINATION:

VITAL SIGNS:  Blood pressure is 167/67, temperature is 96.

GENERAL:  She is moving extremities and her head spontaneously.

LUNGS:  Decreased breath sounds at bases.

ABDOMEN:  Slight protuberant, not tense.  There is no crepitus.  PEG tube site is a little bit of ooz
ing red blood, but no clot.  Nurses note that they had not changed the dressings excessively.  The wo
und VAC is in place.  Bowel sounds are quiescent.

EXTREMITIES:  Reveal trace edema.

 

LABORATORY STUDIES:  White count was 11.2 this morning; hemoglobin 7.3-7.4 last night, 8.0 yesterday 
morning; platelet count 139.  INR on 07/11/2018 was 1.1.  Sodium 151, potassium 4.3, chloride 121, bi
carbonate 18, BUN 85, creatinine 3.82.  Troponins were 0.9 and 0.8 yesterday.  BNP was 3100.  Blood c
ultures negative from the 25th.

 

IMAGING:  Images reviewed with radiology.

 

ASSESSMENT:

1.  This is a 73-year-old female with a stroke, had a PEG tube placed with the nursing home.  The PEG
 tube apparently came out and then she went to an outside facility where it was placed back in at the
 bedside and ultimately she was sent back here with a copious amount of fluid draining around the PEG
 tube site and a scan showing air and fluid in the anterior abdominal wall between the rectus sheath 
and the skin, necessitating removal of the PEG tube.  This was done by General Surgery in the operati
ng room and another PEG tube was placed by General Surgery in the operating room with a wound VAC rob
ng placed.  This was done as the previous PEG hole was high risk for infection and was leaking copiou
sly around a massively dilated tract.

2.  She had a code green last night with deterioration in mental status and is returning somewhat tod
ay.  She had elevated blood pressure and this was thought to be a source.  There was some concern by 
the radiologist that her stroke had extended or the area had enlarged, although the family notes and 
they talked with the neurologist who states this is really unchanged and they felt the fluctuations w
ere more related to everything that has been going on with her, that dictation is not yet available f
or my review.

3.  No overt signs of sepsis.  She remains on antibiotics.  Blood cultures are negative.  CT scan sg
ws no signs of abscess, intraabdominal fluid collection.  There are no signs of cellulitis or fasciit
is.

4.  Intolerance of tube feeds.  I think this relates to probably some component of her diabetes and i
llness overall and lack of mobility.  There were issues with tube feedings at last admission.  Presen
tly, she is not tolerating it and we are not going to force that for a day or so.  We did discuss wit
h the family the option of using Reglan, but with present issues of mental status, I think we should 
hold off from that especially with renal insufficiency.

5.  Renal insufficiency, worsening.

6.  Non-Q-wave myocardial infarction, likely demand ischemia as outlined by Cardiology.  They felt th
at with her clinical state, renal dysfunction, and stage 4 chronic renal disease, she would be a poor
 candidate for catheterization.

7.  With regard to the PEG tube, this has inadvertently been placed through the very lateral aspect o
f the liver.  I have reviewed the films that goes into the stomach, there does not appear to be hemat
cherie of the liver capsule.  The ascites around the liver and the pelvis seems to be the same appearanc
e as it did on the CAT scan that she came in with on the 25th before this PEG was placed.  This is at
 risk to bleed, but as I explained to the family I think there is more risk to remove it at this time
, as it is snug tight on the abdominal wall.  We will watch it closely.  Her hemoglobin did drop a bi
t to 6.9.  I have informed the family regarding to have the blood transfusion.  We are going to watch
 the hemoglobin every 8 hours.  Apparently, Dr. Reed is covering for Dr. Petersen is aware of the is
denise with the PEG tube.

 

RECOMMENDATIONS:

1.  Transfuse.

2.  Hold off on PEG tube feeds, can use PEG for medications and free water.

3.  Serial H&Hs.

4.  We will get an INR with the morning labs as well as hemoglobin A1c _____ of gastroparesis.

## 2018-07-30 NOTE — PDOC.PN
- Subjective


Encounter Start Date: 07/30/18


Encounter Start Time: 09:10





Ms. Purdy was seen today in follow-up of CVA and dislodged PEG tube. She is 

awake and will answer a few questions with one or two words. She will squeeze 

with the right hand. When asked if she is in pain, she shakes her head and says 

no.





- Objective


Resuscitation Status: 


 











Resuscitation Status           FULL:Full Resuscitation














MAR Reviewed: Yes


Vital Signs & Weight: 


 Vital Signs (12 hours)











  Temp Pulse BP


 


 07/30/18 05:12    174/67 H


 


 07/30/18 03:26    178/57 H


 


 07/30/18 03:00  98 F  


 


 07/30/18 00:11   87  184/69 H


 


 07/29/18 23:00  98.6 F  








 Weight











Admit Weight                   194 lb 7.163 oz


 


Weight                         196 lb 3.382 oz











 Most Recent Monitor Data











Heart Rate from ECG            73


 


NIBP                           158/49


 


NIBP BP-Mean                   116


 


Respiration from ECG           14


 


SpO2                           97














I&O: 


 











 07/29/18 07/30/18 07/31/18





 06:59 06:59 06:59


 


Intake Total 1916 3280 


 


Output Total 1240 1190 


 


Balance 676 2090 











Result Diagrams: 


 07/30/18 03:24





 07/30/18 03:24


Additional Labs: 


 Accuchecks











  07/30/18 07/30/18 07/29/18





  06:09 00:08 15:42


 


POC Glucose  189 H  184 H  183 H














  07/29/18





  11:16


 


POC Glucose  201 H














Phys Exam





- Physical Examination


HEENT: PERRLA


Respiratory: no wheezing, no rales


+ rhonchi bilaterally, no wheezeing


Cardiovascular: RRR, no significant murmur, no rub


Gastrointestinal: soft, positive bowel sounds


Musculoskeletal: edema present


+ massive edema in all extremities


+ Left hemiparesis, and dysarthria





Dx/Plan


(1) PEG tube malfunction


Code(s): K94.23 - GASTROSTOMY MALFUNCTION   Status: Acute   





(2) Acute CVA (cerebrovascular accident)


Code(s): I63.9 - CEREBRAL INFARCTION, UNSPECIFIED   Status: Acute   Comment: 

Dense left hemiplegia with large right MCA cva   





(3) CKD (chronic kidney disease) stage 4, GFR 15-29 ml/min


Code(s): N18.4 - CHRONIC KIDNEY DISEASE, STAGE 4 (SEVERE)   Status: Chronic   





(4) Diabetes


Code(s): E11.9 - TYPE 2 DIABETES MELLITUS WITHOUT COMPLICATIONS   Status: 

Chronic   


Qualifiers: 


   Diabetes mellitus type: type 2   Diabetes mellitus long term insulin use: 

without long term use   Diabetes mellitus complication status: with kidney 

complications   Diabetes mellitus complication detail: with chronic kidney 

disease   Chronic kidney disease stage: stage 4 (severe)   Qualified Code(s): 

E11.22 - Type 2 diabetes mellitus with diabetic chronic kidney disease; N18.4 - 

Chronic kidney disease, stage 4 (severe); N18.4 - Chronic kidney disease, stage 

4 (severe); N18.4 - Chronic kidney disease, stage 4 (severe); N18.4 - Chronic 

kidney disease, stage 4 (severe)   





(5) Hypertensive urgency


Code(s): I16.0 - HYPERTENSIVE URGENCY   Status: Resolved   





- Plan





* Dislodged PEG Tube- s/p Surgical replacement of a new one- Continue IV 

antibiotics, as previous PEG site had some necrotizing infection around the 

site.


* Massive CVA with extension- continue aspirin and Plavix


* ? Aspiration Pneumonia, vs. Volume overload- Continue Zosyn, 


* Severe Chronic kidney disease- Management as per Nephrology- she may need 

dialysis- will observe


* Anemia- her H&H has remained stable.


* DM- blood glucose is stable


* HTN- blood pressure has been labile- but mostly in the 150-170 range systolic

- this is an acceptable range given her long-standing history of difficult to 

control blood pressure..

## 2018-07-31 LAB
ANION GAP SERPL CALC-SCNC: 13 MMOL/L (ref 10–20)
BASOPHILS # BLD AUTO: 0 THOU/UL (ref 0–0.2)
BASOPHILS NFR BLD AUTO: 0 % (ref 0–1)
BUN SERPL-MCNC: 61 MG/DL (ref 9.8–20.1)
CALCIUM SERPL-MCNC: 8.2 MG/DL (ref 7.8–10.44)
CHLORIDE SERPL-SCNC: 117 MMOL/L (ref 98–107)
CO2 SERPL-SCNC: 18 MMOL/L (ref 23–31)
CREAT CL PREDICTED SERPL C-G-VRATE: 21 ML/MIN (ref 70–130)
EOSINOPHIL # BLD AUTO: 0.2 THOU/UL (ref 0–0.7)
EOSINOPHIL NFR BLD AUTO: 2.3 % (ref 0–10)
GLUCOSE SERPL-MCNC: 171 MG/DL (ref 83–110)
HGB BLD-MCNC: 8 G/DL (ref 12–16)
LYMPHOCYTES # BLD: 1.2 THOU/UL (ref 1.2–3.4)
LYMPHOCYTES NFR BLD AUTO: 13.8 % (ref 21–51)
MCH RBC QN AUTO: 30.2 PG (ref 27–31)
MCV RBC AUTO: 89.4 FL (ref 78–98)
MONOCYTES # BLD AUTO: 0.4 THOU/UL (ref 0.11–0.59)
MONOCYTES NFR BLD AUTO: 5 % (ref 0–10)
NEUTROPHILS # BLD AUTO: 6.7 THOU/UL (ref 1.4–6.5)
NEUTROPHILS NFR BLD AUTO: 78.9 % (ref 42–75)
PLATELET # BLD AUTO: 103 THOU/UL (ref 130–400)
POTASSIUM SERPL-SCNC: 3.5 MMOL/L (ref 3.5–5.1)
RBC # BLD AUTO: 2.65 MILL/UL (ref 4.2–5.4)
SODIUM SERPL-SCNC: 144 MMOL/L (ref 136–145)
WBC # BLD AUTO: 8.4 THOU/UL (ref 4.8–10.8)

## 2018-07-31 RX ADMIN — Medication SCH: at 12:33

## 2018-07-31 RX ADMIN — Medication SCH ML: at 21:10

## 2018-07-31 RX ADMIN — INSULIN LISPRO PRN UNIT: 100 INJECTION, SOLUTION INTRAVENOUS; SUBCUTANEOUS at 06:06

## 2018-07-31 RX ADMIN — SODIUM BICARBONATE SCH MG: 325 TABLET ORAL at 11:38

## 2018-07-31 RX ADMIN — DEXTROSE AND SODIUM CHLORIDE SCH MLS: 5; 200 INJECTION, SOLUTION INTRAVENOUS at 04:15

## 2018-07-31 RX ADMIN — SCOPALAMINE SCH MG: 1 PATCH, EXTENDED RELEASE TRANSDERMAL at 20:33

## 2018-07-31 RX ADMIN — PIPERACILLIN SODIUM, TAZOBACTAM SODIUM SCH MLS: 2; .25 INJECTION, POWDER, LYOPHILIZED, FOR SOLUTION INTRAVENOUS at 14:06

## 2018-07-31 RX ADMIN — PIPERACILLIN SODIUM, TAZOBACTAM SODIUM SCH MLS: 2; .25 INJECTION, POWDER, LYOPHILIZED, FOR SOLUTION INTRAVENOUS at 05:15

## 2018-07-31 RX ADMIN — DEXTROSE AND SODIUM CHLORIDE SCH MLS: 5; 200 INJECTION, SOLUTION INTRAVENOUS at 05:15

## 2018-07-31 RX ADMIN — NIFEDIPINE SCH MG: 60 TABLET, EXTENDED RELEASE ORAL at 11:38

## 2018-07-31 RX ADMIN — SODIUM BICARBONATE SCH MG: 325 TABLET ORAL at 21:08

## 2018-07-31 RX ADMIN — INSULIN GLARGINE SCH MLS: 100 INJECTION, SOLUTION SUBCUTANEOUS at 20:35

## 2018-07-31 RX ADMIN — PIPERACILLIN SODIUM, TAZOBACTAM SODIUM SCH MLS: 2; .25 INJECTION, POWDER, LYOPHILIZED, FOR SOLUTION INTRAVENOUS at 23:19

## 2018-07-31 RX ADMIN — SODIUM BICARBONATE SCH MG: 325 TABLET ORAL at 16:51

## 2018-07-31 RX ADMIN — DEXTROSE AND SODIUM CHLORIDE SCH MLS: 5; 200 INJECTION, SOLUTION INTRAVENOUS at 14:06

## 2018-07-31 RX ADMIN — INSULIN LISPRO PRN UNIT: 100 INJECTION, SOLUTION INTRAVENOUS; SUBCUTANEOUS at 12:42

## 2018-07-31 NOTE — PRG
DATE OF SERVICE:  07/31/2018

 

SUBJECTIVE:  The patient remains in the CCU.  She has been clinically stable.  She is able to answer 
in short responses.

 

OBJECTIVE:

VITAL SIGNS:  On exam, temperature is 98.0, pulse 85, blood pressure 121/54, sat 98%, a 24-hour intak
e 4572 and output 1012.

HEENT:  Unremarkable.  No JVD.

LUNGS:  Clear.

CARDIAC:  S1 and S2, regular.

EXTREMITIES:  Edematous throughout.

ABDOMEN:  Soft and nontender.

 

LABORATORY DATA:  Sodium ____, chloride 117, CO2 of 18, BUN 61, creatinine 3.4, glucose 171.  White b
lood cell count 8.4, hematocrit 23.7, platelet count 103.

 

ASSESSMENT:

1.  Cerebrovascular accident with severe debilitation

2.  Chronic renal failure.

3.  Improved hypernatremia.

4.  Improved x-ray, possible aspiration at the time of admission.

 

PLAN:  She is stable to go to the floor.  I think there are some family issues regarding previous miranda
sing care on the floor and this may have to be addressed.  I have reviewed her orders and agree with 
current plan in place.

## 2018-07-31 NOTE — RAD
DOBHOFF FEEDING TUBE ADVANCEMENT UNDER FLUOROSCOPY

7/31/18

 

COMPARISON:  

None.

 

FINDINGS:  

Initial  radiograph of the abdomen demonstrates a percutaneous feeding tube in the left upper qu
adrant. Dobhoff feeding tube is in the body of the stomach. 

 

Under fluoroscopy, the Dobhoff feeding tube was advanced such that the distal tip is in the third por
tion of the duodenum. Gastrografin was administered and opacifies the third portion of the duodenum. 
Gastrografin flows freely into the proximal small bowel loops. 

 

IMPRESSION:  

Successful advancement of the Dobhoff feeding tube with the distal tip in the third portion of the du
odenum. 

 

POS: [facility]

## 2018-07-31 NOTE — PDOC.PN
- Subjective


Encounter Start Date: 07/31/18


Encounter Start Time: 08:45





Ms. Purdy was seen today in follow-up of Dislodged PEG tube. She is awake 

and she will respond. She indicates by shaking her head that she is not feeling 

any pain.. 





- Objective


Resuscitation Status: 


 











Resuscitation Status           FULL:Full Resuscitation














MAR Reviewed: Yes


Vital Signs & Weight: 


 Vital Signs (12 hours)











  Temp Pulse BP


 


 07/31/18 04:13   76  165/62 H


 


 07/31/18 04:00  98.0 F  


 


 07/31/18 00:00  99.1 F  


 


 07/30/18 22:40   76  165/62 H








 Weight











Admit Weight                   194 lb 7.163 oz


 


Weight                         196 lb 3.382 oz











 Most Recent Monitor Data











Heart Rate from ECG            75


 


NIBP                           181/54


 


NIBP BP-Mean                   85


 


Respiration from ECG           20


 


SpO2                           98














I&O: 


 











 07/30/18 07/31/18 08/01/18





 06:59 06:59 06:59


 


Intake Total 3280 4572 


 


Output Total 1190 1012 


 


Balance 2090 3560 











Result Diagrams: 


 07/31/18 04:55





 07/31/18 04:55


Additional Labs: 


 Accuchecks











  07/30/18 07/30/18 07/30/18





  22:51 16:39 11:50


 


POC Glucose  187 H  172 H  207 H














Phys Exam





- Physical Examination


HEENT: PERRLA


+ upper airway noise, and some deacreased breath sounds at the bases


Cardiovascular: RRR, no significant murmur, no rub


Gastrointestinal: soft, positive bowel sounds


+ distended


Musculoskeletal: edema present


+ diffuse edema in both upper and lower extremities





Dx/Plan


(1) PEG tube malfunction


Code(s): K94.23 - GASTROSTOMY MALFUNCTION   Status: Acute   





(2) Acute CVA (cerebrovascular accident)


Code(s): I63.9 - CEREBRAL INFARCTION, UNSPECIFIED   Status: Acute   Comment: 

Dense left hemiplegia with large right MCA cva   





(3) CKD (chronic kidney disease) stage 4, GFR 15-29 ml/min


Code(s): N18.4 - CHRONIC KIDNEY DISEASE, STAGE 4 (SEVERE)   Status: Chronic   





(4) Diabetes


Code(s): E11.9 - TYPE 2 DIABETES MELLITUS WITHOUT COMPLICATIONS   Status: 

Chronic   


Qualifiers: 


   Diabetes mellitus type: type 2   Diabetes mellitus long term insulin use: 

without long term use   Diabetes mellitus complication status: with kidney 

complications   Diabetes mellitus complication detail: with chronic kidney 

disease   Chronic kidney disease stage: stage 4 (severe)   Qualified Code(s): 

E11.22 - Type 2 diabetes mellitus with diabetic chronic kidney disease; N18.4 - 

Chronic kidney disease, stage 4 (severe); N18.4 - Chronic kidney disease, stage 

4 (severe); N18.4 - Chronic kidney disease, stage 4 (severe); N18.4 - Chronic 

kidney disease, stage 4 (severe)   





(5) Hypertensive urgency


Code(s): I16.0 - HYPERTENSIVE URGENCY   Status: Resolved   





- Plan





* Dislodgement of PEG tube- She had a new Feeding tube incerted surgically- I 

am informed that this is not functioning well- will defer to GI/Surgery


* HTN- blood pressure is labile, but can keep this in the range of 150-160 

systolic- will treat with PRN medication while she is NPO, and consider Cardene 

drip, if she rebounds again


* CVA- stable- may need to give aspirin rectally while she is not able to 

receive medication via her oral/PEG


* DM- blood glucose is stable.


* Chronic kidney disease- stable- no change

## 2018-07-31 NOTE — RAD
CHEST ONE VIEW:

 

HISTORY:

Ventilated patient.  Respiratory distress.

 

COMPARISON:

07/30/2018

 

FINDINGS:

Stable cardiac silhouette.  There is persistent rightward deviation of the trachea.  The pulmonary ve
ssels and hilum are normal.  The costophrenic angles are clear.  No masses or consolidation.  Previou
sly noted opacities in the lung bases have decreased.  No pneumothorax or osseous abnormalities.

 

IMPRESSION:

Improved aeration of lung parenchyma.  Previously noted vascular congestion/edema has decreased.  Con
tinued surveillance is recommended.

 

POS: [facility]

## 2018-07-31 NOTE — PRG
DATE OF SERVICE:  07/31/2018

 

SERVICE:  Renal Medicine.

 

SUBJECTIVE:  Ms. Purdy is a 73-year-old black female, followed up for chronic renal failure.  She 
has underlying hypertensive nephropathy.  Renal function has been labile.  However, in the last few d
ays, renal function has been stabilizing.  She also was admitted due to an abdominal pain and displac
ement of a PEG tube.  She was also noted to be hypernatremic in the past and we have being giving 1/4
 normal saline with this patient with improvement in serum sodium.  This morning, she is more awake.

 

OBJECTIVE:

VITAL SIGNS:  Blood pressure is 181/54, heart rate 75, respiratory rate 20, pulse ox 98%.

GENERAL EXAM:  The patient is awake and can converse, not in distress

SKIN:  Adequate turgor.

HEENT:  Slightly pale conjunctivae.  Anicteric sclerae.

NECK:  No neck mass, no carotid bruits, no JVD.

CHEST:  No deformities.

LUNGS:  Decreased breath sounds.

HEART:  Normal sinus rhythm.  No murmur, no gallops, no rubs.

ABDOMEN:  Globular, soft.  Positive for PEG.

EXTREMITIES:  No edema.

NEUROLOGICAL EXAM:  Left-sided weakness.

 

Medications of 07/31/2018 were reviewed.

 

LABORATORY DATA:  Laboratories of 07/31/2018, white count 8.4, hemoglobin 8, hematocrit 23.7.  Sodium
 144, potassium 3.5, chloride 117, carbon dioxide 18, BUN 61, creatinine 3.46, GFR 16 mL per minute. 
 Calcium 8.2.

 

ASSESSMENT AND PLAN:

1.  Chronic renal failure from hypertensive nephropathy, stabilizing renal function.  Creatinine note
d at 3.4, which is near baseline.  Continue supportive care.

2.  Hypernatremia, much improved.  Currently, on 1/4 normal sodium chloride.  Continue current IV flu
ids.  No changes to be made today.

3.  Anemia - on weekly Epogen.

4.  Status post cerebrovascular accident.  Continue supportive care.  Awaiting nursing home placement
.

## 2018-07-31 NOTE — RAD
KUB:

 

Date:  07/31/18 

 

HISTORY:  

Evaluate Dobbhoff tube location. 

 

FINDINGS:

The Dobbhoff tube is in a stable position when compared to the fluoroscopic guided placement, at whic
h time distal tip overlies the horizontal portion of the duodenum. There is contrast media within the
 stomach and colon. 

 

IMPRESSION: 

Stable Dobbhoff feeding tube, distal tip overlies expected location of the horizontal portion of the 
duodenum. 

 

 

POS: [facility]

## 2018-07-31 NOTE — PRG
DATE OF SERVICE:  07/31/2018

 

SUBJECTIVE:  The patient had a Dobbhoff tube placed.  She is otherwise without change.

 

OBJECTIVE:

VITAL SIGNS:  Temperature is 98.5, pulse of 76, blood pressure 153/44, respiratory rate of 13.

CHEST:  Clear.

CARDIOVASCULAR:  Regular rate and rhythm.

ABDOMEN:  Without change.  Benign.

 

LABORATORY DATA:  Shows a white blood cell count of 8.4, hemoglobin 8.0.  Chemistries show a BUN of 6
1, creatinine 3.46.

 

ASSESSMENT:

1.  Previous percutaneous endoscopic gastrostomy with subsequent large wound, status post removal and
 wound VAC.

2.  High gastric residuals.

3.  Present PEG going through the left hepatic lobe.

 

RECOMMENDATIONS:

1.  Continue Dobbhoff feedings.

2.  May let present PEG tract mature and then possibly remove.  We will discuss with Dr. Nicola lawrence.

## 2018-08-01 LAB
ANION GAP SERPL CALC-SCNC: 13 MMOL/L (ref 10–20)
BASOPHILS # BLD AUTO: 0 THOU/UL (ref 0–0.2)
BASOPHILS NFR BLD AUTO: 0.6 % (ref 0–1)
BUN SERPL-MCNC: 57 MG/DL (ref 9.8–20.1)
CALCIUM SERPL-MCNC: 7.9 MG/DL (ref 7.8–10.44)
CHLORIDE SERPL-SCNC: 115 MMOL/L (ref 98–107)
CO2 SERPL-SCNC: 17 MMOL/L (ref 23–31)
CREAT CL PREDICTED SERPL C-G-VRATE: 21 ML/MIN (ref 70–130)
EOSINOPHIL # BLD AUTO: 0.1 THOU/UL (ref 0–0.7)
EOSINOPHIL NFR BLD AUTO: 1.9 % (ref 0–10)
GLUCOSE SERPL-MCNC: 170 MG/DL (ref 83–110)
HGB BLD-MCNC: 7.9 G/DL (ref 12–16)
LYMPHOCYTES # BLD: 1.4 THOU/UL (ref 1.2–3.4)
LYMPHOCYTES NFR BLD AUTO: 18.1 % (ref 21–51)
MCH RBC QN AUTO: 30.7 PG (ref 27–31)
MCV RBC AUTO: 88.2 FL (ref 78–98)
MONOCYTES # BLD AUTO: 0.4 THOU/UL (ref 0.11–0.59)
MONOCYTES NFR BLD AUTO: 4.9 % (ref 0–10)
NEUTROPHILS # BLD AUTO: 5.8 THOU/UL (ref 1.4–6.5)
NEUTROPHILS NFR BLD AUTO: 74.5 % (ref 42–75)
PLATELET # BLD AUTO: 85 THOU/UL (ref 130–400)
POTASSIUM SERPL-SCNC: 3.2 MMOL/L (ref 3.5–5.1)
RBC # BLD AUTO: 2.57 MILL/UL (ref 4.2–5.4)
SODIUM SERPL-SCNC: 142 MMOL/L (ref 136–145)
WBC # BLD AUTO: 7.8 THOU/UL (ref 4.8–10.8)

## 2018-08-01 RX ADMIN — INSULIN LISPRO PRN UNIT: 100 INJECTION, SOLUTION INTRAVENOUS; SUBCUTANEOUS at 18:11

## 2018-08-01 RX ADMIN — Medication SCH ML: at 09:04

## 2018-08-01 RX ADMIN — SODIUM BICARBONATE SCH MG: 325 TABLET ORAL at 14:24

## 2018-08-01 RX ADMIN — INSULIN GLARGINE SCH MLS: 100 INJECTION, SOLUTION SUBCUTANEOUS at 21:26

## 2018-08-01 RX ADMIN — DEXTROSE AND SODIUM CHLORIDE SCH MLS: 5; 200 INJECTION, SOLUTION INTRAVENOUS at 06:48

## 2018-08-01 RX ADMIN — SODIUM BICARBONATE SCH MG: 325 TABLET ORAL at 21:23

## 2018-08-01 RX ADMIN — NIFEDIPINE SCH MG: 60 TABLET, EXTENDED RELEASE ORAL at 09:03

## 2018-08-01 RX ADMIN — PIPERACILLIN SODIUM, TAZOBACTAM SODIUM SCH MLS: 2; .25 INJECTION, POWDER, LYOPHILIZED, FOR SOLUTION INTRAVENOUS at 21:24

## 2018-08-01 RX ADMIN — DEXTROSE AND SODIUM CHLORIDE SCH: 5; 200 INJECTION, SOLUTION INTRAVENOUS at 23:43

## 2018-08-01 RX ADMIN — SODIUM BICARBONATE SCH MG: 325 TABLET ORAL at 09:01

## 2018-08-01 RX ADMIN — Medication SCH ML: at 21:26

## 2018-08-01 RX ADMIN — DEXTROSE AND SODIUM CHLORIDE SCH MLS: 5; 200 INJECTION, SOLUTION INTRAVENOUS at 13:05

## 2018-08-01 RX ADMIN — PIPERACILLIN SODIUM, TAZOBACTAM SODIUM SCH MLS: 2; .25 INJECTION, POWDER, LYOPHILIZED, FOR SOLUTION INTRAVENOUS at 06:44

## 2018-08-01 RX ADMIN — PIPERACILLIN SODIUM, TAZOBACTAM SODIUM SCH MLS: 2; .25 INJECTION, POWDER, LYOPHILIZED, FOR SOLUTION INTRAVENOUS at 13:10

## 2018-08-01 RX ADMIN — INSULIN LISPRO PRN UNIT: 100 INJECTION, SOLUTION INTRAVENOUS; SUBCUTANEOUS at 13:49

## 2018-08-01 NOTE — CT
CT ABDOMEN AND PELVIS WITHOUT CONTRAST:

 

Date:  08/01/18 

 

HISTORY:  

Gastrocutaneous fistula. 

 

COMPARISON:  

CT abdomen and pelvis without contrast dated 07/27/18. 

 

FINDINGS:

There are layering bilateral pleural effusions. Mild atelectasis. 

 

Moderate edema in the lung bases. 

 

There was contrast given through the Dobbhoff tube. There was contrast extending through the sinus tr
act to the skin from the greater curvature of the stomach. 

 

Hepatic contour is nodular. Moderate ascites. Calcified fibroid uterus. 

 

IMPRESSION: 

1.  Contrast does extend from the stomach lumen to the overlying sinus tract. 

 

2.  Severe edema of the stomach. 

 

3.  Similar appearance of the ascites. 

 

4.  New moderate size layering pleural effusions with associated compressive atelectasis. There is al
so mild pulmonary edema of the lung bases. 

 

 

POS: [facility]

## 2018-08-01 NOTE — PRG
DATE OF SERVICE:  08/01/2018

 

Ms. Purdy is doing reasonably well.  She has a Dobbhoff tube that is now in place.

 

PHYSICAL EXAMINATION: 

VITAL SIGNS:  Temperature is 98.5, pulse 87, respiration 16, O2 sat 100% on 2 liters, blood pressure 
106/48.

GENERAL:  She will answer in short phrases.  

HEENT:  Pupils are reactive.  Sclerae icteric.  Oropharynx clear.

NECK:  No JVD.

LUNGS:  Clear.

CARDIAC:  S1 and S2 regular.

ABDOMEN:  Wound VAC noted.

EXTREMITIES:  Edematous throughout.

 

LABORATORY DATA:  White blood cell count 7.8, hematocrit 22.7, platelet count 85.  Sodium 142, potass
ium 3.2, chloride 115, CO2 17, BUN 57, creatinine 2.5, glucose 170.

 

ASSESSMENT:

1.  Status post cerebrovascular accident.

2.  Chronic renal failure.

3.  Improved hypernatremia.

 

PLAN:  The main issue now is what to do about her feeding tube.  Management is being left up to Dr. CONNOR mondragon and Dr. Ribera.  Her pulmonary status is stable.

## 2018-08-01 NOTE — PRG
DATE OF SERVICE:  08/01/2018

 

SUBJECTIVE:  Ms. Purdy is a 73-year-old black female who is status post cerebrovascular accident a
nd being followed by Renal Service for her chronic renal failure.  She has been having fluctuating cr
eatinine.  She has been empirically volume repleted.  In addition, she was at one time noted to be hy
ponatremic.  For that reason, has been placed on quarter normal saline.  No new complaints today.  Th
is morning, she is more awake and can converse.  No new complaints.  No chest pain or shortness of br
eath.

 

PHYSICAL EXAMINATION:

VITAL SIGNS:  Blood pressure is 152/51, heart rate 77, respiratory rate 19, temperature 98.3, pulse o
x 97%.

GENERAL:  Noted to be awake, alert, comfortable, not in distress.

SKIN:  Adequate turgor.

HEENT:  She has slightly pale conjunctivae, anicteric sclerae.

NECK:  No neck mass, no carotid bruits, no JVD.

CHEST:  No deformities.

LUNGS:  Clear breath sounds.

HEART:  Normal sinus rhythm.  No murmurs, no gallops, no rubs.

ABDOMEN:  Globular, soft, nontender, no masses.  Positive for PEG tube.

EXTREMITIES:  No edema.

NEUROLOGIC:  Decreased motor in left extremities.

 

MEDICATIONS:  Medications of 08/01/2018 reviewed.

 

LABORATORY DATA:  Laboratories of 08/01/2018; white count 7.8, hemoglobin 7.9.  Sodium 142, potassium
 3.2, chloride 115, carbon dioxide 17, BUN 37, creatinine 3.5, glucose 170, calcium 7.9.

 

ASSESSMENT AND PLAN:

1.  Chronic renal failure, creatinine noted at 3.5.  This is near baseline.  Yesterday, this was 3.46
.  Continue current management.  No indication for any dialytic intervention.  Change 1/4 normal sali
ne to at least D5 half normal saline to run at about 100 mL an hour.

2.  Anemia, continuing weekly Epogen, p.r.n. blood transfusion.

3.  Status post cerebrovascular accident.  Continue supportive care.

4.  Hypernatremia, resolved.  We will change IV fluid from 1/4 normal saline to half normal saline.

 

Continue supportive care.

## 2018-08-01 NOTE — PDOC.EVN
Event Note





- Event Note


Event Note: 





reviewed chart and home medicines were aspirin and plavix.  given the low 

platelets, will place a hold order on the plavix, and discuss with Cardiology 

and/or Neurology tomorrow.  Repeat CBC in AM as well.

## 2018-08-01 NOTE — PDOC.PN
- Subjective


Encounter Start Date: 08/01/18 (f/u stroke)


Encounter Start Time: 11:02


Subjective: Pt denies pain when asked, able to follow commands on right


-: side.  Is grunting, but answering yes/no and pausing.





- Objective


Resuscitation Status: 


 











Resuscitation Status           FULL:Full Resuscitation














Vital Signs & Weight: 


 Vital Signs (12 hours)











  Temp Pulse Resp BP Pulse Ox


 


 08/01/18 09:03   77   


 


 08/01/18 09:02   77   


 


 08/01/18 07:27  98.5 F  77  16  156/48 H  100


 


 08/01/18 03:57  99.3 F  68  14  155/47 H  99








 Weight











Admit Weight                   194 lb 7.163 oz


 


Weight                         226 lb 6.4 oz











 Most Recent Monitor Data











Heart Rate from ECG            75


 


NIBP                           149/43


 


NIBP BP-Mean                   101


 


Respiration from ECG           21


 


SpO2                           99














I&O: 


 











 07/31/18 08/01/18 08/02/18





 06:59 06:59 06:59


 


Intake Total 4572 1585 


 


Output Total 1012 955 


 


Balance 3560 630 











Result Diagrams: 


 08/01/18 03:30





 08/01/18 03:30


Additional Labs: 


 Accuchecks











  08/01/18 08/01/18 07/31/18





  06:05 00:51 20:25


 


POC Glucose  184 H  157 H  162 H














  07/31/18 07/31/18





  16:56 12:41


 


POC Glucose  145 H  177 H











EKG Reviewed by me: Yes (tele - sinus 60-80's)





Phys Exam





- Physical Examination


Constitutional: NAD


facial droop left side


Respiratory: no wheezing, no rales, no rhonchi


Cardiovascular: RRR, no significant murmur


Gastrointestinal: soft, non-tender, positive bowel sounds


wound vac in place


pitting edema in arms/hands and legs


no movement left side, facial droop


able to move right leg side to side.  Able to shake hand, lift right hand


Deviation from normal: unable to assess


Skin: no rash





Dx/Plan


(1) PEG tube malfunction


Code(s): K94.23 - GASTROSTOMY MALFUNCTION   Status: Acute   





(2) Dysphagia


Code(s): R13.10 - DYSPHAGIA, UNSPECIFIED   Status: Acute   


Qualifiers: 


   Dysphagia type: unspecified   Qualified Code(s): R13.10 - Dysphagia, 

unspecified   


Comment: s/p peg   





(3) Hemiplegia affecting left nondominant side


Code(s): G81.94 - HEMIPLEGIA, UNSPECIFIED AFFECTING LEFT NONDOMINANT SIDE   

Status: Acute   


Qualifiers: 


   Hemiplegia type: flaccid 





(4) Metabolic acidosis


Code(s): E87.2 - ACIDOSIS   Status: Acute   





(5) CKD (chronic kidney disease) stage 4, GFR 15-29 ml/min


Code(s): N18.4 - CHRONIC KIDNEY DISEASE, STAGE 4 (SEVERE)   Status: Chronic   





(6) Diabetes


Code(s): E11.9 - TYPE 2 DIABETES MELLITUS WITHOUT COMPLICATIONS   Status: 

Chronic   


Qualifiers: 


   Diabetes mellitus type: type 2   Diabetes mellitus long term insulin use: 

without long term use   Diabetes mellitus complication status: with kidney 

complications   Diabetes mellitus complication detail: with chronic kidney 

disease   Chronic kidney disease stage: stage 4 (severe)   Qualified Code(s): 

E11.22 - Type 2 diabetes mellitus with diabetic chronic kidney disease; N18.4 - 

Chronic kidney disease, stage 4 (severe); N18.4 - Chronic kidney disease, stage 

4 (severe); N18.4 - Chronic kidney disease, stage 4 (severe); N18.4 - Chronic 

kidney disease, stage 4 (severe)   





(7) Hyperlipidemia


Code(s): E78.5 - HYPERLIPIDEMIA, UNSPECIFIED   Status: Chronic   


Qualifiers: 


   Hyperlipidemia type: unspecified   Qualified Code(s): E78.5 - Hyperlipidemia

, unspecified   





(8) Hypertension


Code(s): I10 - ESSENTIAL (PRIMARY) HYPERTENSION   Status: Chronic   


Qualifiers: 


   Hypertension type: essential hypertension   Qualified Code(s): I10 - 

Essential (primary) hypertension   





(9) Hypokalemia


Code(s): E87.6 - HYPOKALEMIA   Status: Acute   





(10) Thrombocytopenia


Code(s): D69.6 - THROMBOCYTOPENIA, UNSPECIFIED   Status: Acute   





- Plan





* Abd wound - appreciate Gen Surg - wound vac in place, CT ordered for eval and 

current PEG not being used


* Appreciate nephro consult -managing fluids, acidosis, ckd


* hypokalemia - replace potassium


* htn - controlled


* recent stroke - on aspirin, statin, plavix.


* 


* Urine culture from 7/25 shows enterobacter resistant to Zosyn - low colony 

count, doubt infection.  After abx, retest urine unless clinical change to 

warrant antibiotics change


* 


* thrombocytopenia - monitor.  pt is on dual anti-platelet therapy - if this 

continues to decline or any concerns, will need to discuss with neurology and/

or cardiology.


* 


* lieberman cath in place - consider removal tomorrow if not needed for UOP 

tracking by Nephrology


* 


* dvt prophy- scd's.  Pt with platelets less than 100.


* gi prophy - on ppi


* code status full 


* 


* pt remains at high risk in current condition.  Reviewed current plan of care 

with RN.

## 2018-08-01 NOTE — PRG
DATE OF SERVICE:  08/01/2018

 

SUBJECTIVE:  The patient is moaning.  She presently undergoing a CT contrast, oral contrast infusion 
through Dobbhoff tube.  ____ no reported Dobbhoff problems.

 

OBJECTIVE:

VITAL SIGNS:  Temperature 98.5, pulse 77, respiratory rate 16, and blood pressure 156/48.

CHEST:  Clear.

CARDIOVASCULAR:  Regular rate and rhythm.

ABDOMEN:  Distended, somewhat ____ tympanitic.

 

LABORATORY DATA:  Shows a white blood cell count 7.8, hemoglobin 7.9, hematocrit 22.7, platelet count
 of 85.  Chemistries show a potassium of 3.2, CO2 of 17, BUN is 57, creatinine of 3.53.

 

ASSESSMENT:

1.  PEG leak secondary to pull PEG.

2.  New PEG through the left lobe of the liver.

 

RECOMMENDATIONS:

1.  Agree with CT scan.

2.  Continue Dobbhoff feedings.

3.  Continue a new PEG tube to suction.

## 2018-08-01 NOTE — PDOC.GSPN
Surgery Progress Note: Subj





- Subjective


Narrative: 





Bilious drainage at old PEG site, low volume. No appearance of tube feeds, so 

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 Should be safe to continue feeds via 

dobhoff since this is past D3, but will get CT w oral contrast only via dobhoff 

to make sure no other area of involvement and no abscess/ undrained fluid 

collection. New PEG placed to drain to decompress stomach. 





Surgery Progress Note: Obj





- Vital signs


Vital signs: 


 Vital Signs - Most Recent











Temp Pulse Resp BP Pulse Ox


 


 98.5 F   77   16   156/48 H  100 


 


 08/01/18 07:27  08/01/18 09:03  08/01/18 07:27  08/01/18 07:27  08/01/18 07:27














Surgery Progress Note: Results





- Labs


Result Diagrams: 


 08/01/18 03:30





 08/01/18 03:30


Lab results: 


 Laboratory Results - last 24 hr











  08/01/18 08/01/18 08/01/18





  00:51 03:30 03:30


 


WBC    7.8


 


RBC    2.57 L


 


Hgb    7.9 L


 


Hct    22.7 L


 


MCV    88.2


 


MCH    30.7


 


MCHC    34.8


 


RDW    15.5 H


 


Plt Count    85 L


 


MPV    8.7


 


Neutrophils %    74.5


 


Lymphocytes %    18.1 L


 


Monocytes %    4.9


 


Eosinophils %    1.9


 


Basophils %    0.6


 


Neutrophils #    5.8


 


Lymphocytes #    1.4


 


Monocytes #    0.4


 


Eosinophils #    0.1


 


Basophils #    0.0


 


Sodium   142 


 


Potassium   3.2 L 


 


Chloride   115 H 


 


Carbon Dioxide   17 L 


 


Anion Gap   13 


 


BUN   57 H 


 


Creatinine   3.53 H 


 


Estimated GFR (MDRD)   15 


 


Glucose   170 H 


 


POC Glucose  157 H  


 


Calcium   7.9 














  08/01/18





  06:05


 


WBC 


 


RBC 


 


Hgb 


 


Hct 


 


MCV 


 


MCH 


 


MCHC 


 


RDW 


 


Plt Count 


 


MPV 


 


Neutrophils % 


 


Lymphocytes % 


 


Monocytes % 


 


Eosinophils % 


 


Basophils % 


 


Neutrophils # 


 


Lymphocytes # 


 


Monocytes # 


 


Eosinophils # 


 


Basophils # 


 


Sodium 


 


Potassium 


 


Chloride 


 


Carbon Dioxide 


 


Anion Gap 


 


BUN 


 


Creatinine 


 


Estimated GFR (MDRD) 


 


Glucose 


 


POC Glucose  184 H


 


Calcium

## 2018-08-02 LAB
ANALYZER IN CARDIO: (no result)
ANION GAP SERPL CALC-SCNC: 15 MMOL/L (ref 10–20)
BASE EXCESS STD BLDA CALC-SCNC: -9 MEQ/L
BASOPHILS # BLD AUTO: 0 THOU/UL (ref 0–0.2)
BASOPHILS NFR BLD AUTO: 0.2 % (ref 0–1)
BUN SERPL-MCNC: 54 MG/DL (ref 9.8–20.1)
CA-I BLDA-SCNC: 1.1 MMOL/L (ref 1.12–1.3)
CALCIUM SERPL-MCNC: 8.3 MG/DL (ref 7.8–10.44)
CHLORIDE SERPL-SCNC: 111 MMOL/L (ref 98–107)
CO2 SERPL-SCNC: 16 MMOL/L (ref 23–31)
CREAT CL PREDICTED SERPL C-G-VRATE: 22 ML/MIN (ref 70–130)
EOSINOPHIL # BLD AUTO: 0.1 THOU/UL (ref 0–0.7)
EOSINOPHIL NFR BLD AUTO: 1.8 % (ref 0–10)
GLUCOSE SERPL-MCNC: 229 MG/DL (ref 83–110)
HCO3 BLDA-SCNC: 15.6 MEQ/L (ref 22–28)
HCT VFR BLDA CALC: 31 % (ref 36–47)
HGB BLD-MCNC: 8.1 G/DL (ref 12–16)
HGB BLD-MCNC: 8.2 G/DL (ref 12–16)
HGB BLDA-MCNC: 10.4 G/DL (ref 12–16)
LYMPHOCYTES # BLD: 1.1 THOU/UL (ref 1.2–3.4)
LYMPHOCYTES NFR BLD AUTO: 12.7 % (ref 21–51)
MAGNESIUM SERPL-MCNC: 1.7 MG/DL (ref 1.6–2.6)
MCH RBC QN AUTO: 30.7 PG (ref 27–31)
MCV RBC AUTO: 88.3 FL (ref 78–98)
MONOCYTES # BLD AUTO: 0.6 THOU/UL (ref 0.11–0.59)
MONOCYTES NFR BLD AUTO: 6.9 % (ref 0–10)
NEUTROPHILS # BLD AUTO: 6.6 THOU/UL (ref 1.4–6.5)
NEUTROPHILS NFR BLD AUTO: 78.4 % (ref 42–75)
O2 A-A PPRESDIFF RESPIRATORY: 172.03 MM[HG] (ref 0–20)
PCO2 BLDA: 29.3 MMHG (ref 35–45)
PH BLDA: 7.34 [PH] (ref 7.35–7.45)
PLATELET # BLD AUTO: 84 THOU/UL (ref 130–400)
PO2 BLDA: 112.2 MMHG (ref 70–?)
POTASSIUM BLD-SCNC: 3.7 MMOL/L (ref 3.7–5.3)
POTASSIUM SERPL-SCNC: 3.5 MMOL/L (ref 3.5–5.1)
RBC # BLD AUTO: 2.66 MILL/UL (ref 4.2–5.4)
SODIUM SERPL-SCNC: 138 MMOL/L (ref 136–145)
SPECIMEN DRAWN FROM PATIENT: (no result)
WBC # BLD AUTO: 8.5 THOU/UL (ref 4.8–10.8)

## 2018-08-02 PROCEDURE — 0JB80ZZ EXCISION OF ABDOMEN SUBCUTANEOUS TISSUE AND FASCIA, OPEN APPROACH: ICD-10-PCS | Performed by: SURGERY

## 2018-08-02 PROCEDURE — 02H633Z INSERTION OF INFUSION DEVICE INTO RIGHT ATRIUM, PERCUTANEOUS APPROACH: ICD-10-PCS | Performed by: SURGERY

## 2018-08-02 PROCEDURE — 0DQ64ZZ REPAIR STOMACH, PERCUTANEOUS ENDOSCOPIC APPROACH: ICD-10-PCS | Performed by: SURGERY

## 2018-08-02 PROCEDURE — 0JH63XZ INSERTION OF TUNNELED VASCULAR ACCESS DEVICE INTO CHEST SUBCUTANEOUS TISSUE AND FASCIA, PERCUTANEOUS APPROACH: ICD-10-PCS | Performed by: SURGERY

## 2018-08-02 PROCEDURE — 0FN24ZZ RELEASE LEFT LOBE LIVER, PERCUTANEOUS ENDOSCOPIC APPROACH: ICD-10-PCS | Performed by: SURGERY

## 2018-08-02 RX ADMIN — PIPERACILLIN SODIUM, TAZOBACTAM SODIUM SCH MLS: 2; .25 INJECTION, POWDER, LYOPHILIZED, FOR SOLUTION INTRAVENOUS at 05:41

## 2018-08-02 RX ADMIN — INSULIN GLARGINE SCH: 100 INJECTION, SOLUTION SUBCUTANEOUS at 21:31

## 2018-08-02 RX ADMIN — INSULIN LISPRO PRN UNIT: 100 INJECTION, SOLUTION INTRAVENOUS; SUBCUTANEOUS at 11:59

## 2018-08-02 RX ADMIN — INSULIN LISPRO PRN UNIT: 100 INJECTION, SOLUTION INTRAVENOUS; SUBCUTANEOUS at 05:47

## 2018-08-02 RX ADMIN — Medication SCH ML: at 08:37

## 2018-08-02 RX ADMIN — CLONIDINE SCH: 0.3 PATCH TRANSDERMAL at 22:45

## 2018-08-02 RX ADMIN — SODIUM BICARBONATE SCH MG: 325 TABLET ORAL at 08:33

## 2018-08-02 RX ADMIN — Medication SCH ML: at 21:16

## 2018-08-02 RX ADMIN — NIFEDIPINE SCH MG: 60 TABLET, EXTENDED RELEASE ORAL at 08:35

## 2018-08-02 RX ADMIN — SODIUM BICARBONATE SCH: 325 TABLET ORAL at 16:02

## 2018-08-02 RX ADMIN — HYDROCORTISONE SCH: 10 CREAM TOPICAL at 21:31

## 2018-08-02 NOTE — RAD
NG TUBE PLACEMENT WITH FLUOROSCOPY

8/2/18

 

HISTORY: 

Dobhoff tube placement with fluoro.

 

COMPARISON:  

CT prior day. 

 

FINDINGS:  

The patient initially given some contrast through the already placed Dobhoff tube. The dobhoff tube h
as an unusual vertical appearance. There was contrast in the gastric stomach and fundus although ther
e appear to be contrast extending outside the lumen. Dobhoff tube was advanced and more contrast was 
given. There is contrast extravasating into the peritoneal cavity. The exam was ended. The case was d
iscussed with the surgeon. Plan is for patient to go to the operating Room. 

 

IMPRESSION:  

Perforated stomach prior to the advancement of the Dobhoff tube. It is a possibility that the sinus t
ract had been perforated with the tube extending into the peritoneal cavity. The indwelling Dobhoff t
ube was pulled. 

 

POS: [facility]

## 2018-08-02 NOTE — CON
DATE OF CONSULTATION:  08/02/2018

 

REASON FOR CONSULTATION:  Thrombocytopenia.

 

HISTORY OF PRESENT ILLNESS:  Ms. Purdy is a 73-year-old female, who presented to the emergency hug[facility]
 with abdominal pain.  She was noted to have a leaking PEG tube with necrotic tissue.  She was start
ed on antibiotics including vancomycin and Zosyn.  She had a positive urine culture for Enterobacter.
  On admission, she had a white count of 13.6, hemoglobin of 9.5 and a platelet count of 166,000.  Ov
er the course of last week and a half, her platelets have slowly dropped and are currently at 84,000.
  The patient has no history of thrombocytopenia.  She has not had any heparin this admission.  She d
id receive a low dose Lovenox for approximately 5 days.  The patient has a history of chronic kidney 
disease, 4.  She was recently started on Procrit for chronic anemia.  The patient has had abdominal a
nd pelvis CT during this hospitalization.  There is no evidence of splenomegaly.  The patient has a h
istory of CVA with hemiparesis.  She does answer simple questions.  History was obtained from review 
of the medical record and discussion with the daughter.

 

PAST MEDICAL AND SURGICAL HISTORY:

1.  Cerebrovascular accident.

2.  Type 2 diabetes.

3.  Hypertension.

4.  History of dysphagia with PEG tube placement.

 

ALLERGIES:  No known drug allergies.

 

CURRENT MEDICATIONS:

1.  Norvasc 10 mg daily.

2.  Ecotrin 325 daily.

3.  Lipitor 40 mg daily.

4.  Coreg 25 mg b.i.d.

5.  Cefepime daily.

6.  Catapres patch weekly.

7.  Procrit 7500 units weekly.

8.  Apresoline 50 mg t.i.d.

9.  Insulin p.r.n.

10.  Minoxidil 10 mg daily.

11.  Procardia 60 mg daily.

12.  Protonix 40 mg daily.

13.  MiraLax daily.

14.  Bicarbonate daily.

 

FAMILY HISTORY:  Noncontributory.

 

SOCIAL HISTORY:  The patient is .  No alcohol, tobacco or illicit drug use.

 

REVIEW OF SYSTEMS:  Unable to obtain secondary to aphasia.

 

PHYSICAL EXAMINATION:

VITAL SIGNS:  Temperature is 97.9, pulse 77, respiratory rate 20, blood pressure is 169/61.  She is 1
00% on 2 liters.

GENERAL:  Well-developed, well-nourished female, in no acute distress.

HEENT:  Normocephalic, atraumatic.  Pupils are equal and reactive to light.

CARDIOVASCULAR:  Regular rate and rhythm.

LUNGS:  Clear.

ABDOMEN:  Mildly tender to palpation.  Bowel sounds are positive.

EXTREMITIES:  No clubbing, cyanosis or edema.

SKIN:  No rash.

HEMATOLOGIC:  No petechia or purpura.

NEUROLOGIC:  The patient has hemiparesis.

 

PERTINENT LABORATORY AND X-RAYS:  Current WBCs are 8.5, hemoglobin 8.2, hematocrit 23.5, platelet cou
nt is 84,000, 78% neutrophils, 12% lymphocytes.  Sodium is 138, potassium 3.5, chloride 111, CO2 of 1
6, BUN is 54, creatinine 3.68, calcium is 8.6.  Radiology per HPI.

 

ASSESSMENT:

1.  Acute onset of thrombocytopenia.

2.  Enterobacter urinary tract infection with Zosyn administration.

3.  Prolonged hospitalization.

 

DISCUSSION:  The patient's platelet count was normal on this admission.  She was administered Zosyn I
V antibiotics, which certainly can reduce her platelet count.  Her thrombocytopenia is likely a combi
nation of medication induced as well as consumptive from prolonged illness.  No transfusion is requir
ed at this time.  We will continue to monitor her CBC and continue her aspirin and Plavix.  The case 
was discussed with Dr. Abreu.

 

Thank you for the consult.

## 2018-08-02 NOTE — PDOC.PN
- Subjective


Encounter Start Date: 08/02/18 (f/u htn)


Encounter Start Time: 09:33


Subjective: Dobhoff came out some this AM - RN reports it was easily


-: replaced.  Awaiting xray.  Loose stools overnight and today





- Objective


Resuscitation Status: 


 











Resuscitation Status           FULL:Full Resuscitation














Vital Signs & Weight: 


 Vital Signs (12 hours)











  Temp Pulse Resp BP BP Pulse Ox


 


 08/02/18 08:36   87   173/57 H  


 


 08/02/18 08:35   87   173/57 H  


 


 08/02/18 08:34   87   173/57 H  


 


 08/02/18 07:21  98.9 F  87  20   176/61 H  97


 


 08/02/18 04:00  99.1 F  81  25 H   162/48 H  97


 


 08/02/18 00:00  99.1 F  77  25 H   143/47 H  97








 Weight











Admit Weight                   194 lb 7.163 oz


 


Weight                         224 lb 9.6 oz











 Most Recent Monitor Data











Heart Rate from ECG            75


 


NIBP                           149/43


 


NIBP BP-Mean                   101


 


Respiration from ECG           21


 


SpO2                           99














I&O: 


 











 08/01/18 08/02/18 08/03/18





 06:59 06:59 06:59


 


Intake Total 1585 2225 


 


Output Total 955 975 


 


Balance 630 1250 











Result Diagrams: 


 08/02/18 03:10





 08/02/18 03:10


Additional Labs: 


 Accuchecks











  08/02/18 08/02/18 08/01/18





  05:46 00:46 17:49


 


POC Glucose  232 H  207 H  222 H














  08/01/18





  13:41


 


POC Glucose  186 H











EKG Reviewed by me: Yes (sinus 60-80's)





Phys Exam





- Physical Examination


Constitutional: NAD


left facial droop, head leans to left side


Respiratory: no wheezing, no rales


scattered rhonchi


Cardiovascular: RRR, no significant murmur


Gastrointestinal: non-tender, positive bowel sounds


abdominal wall edema and some ecchymosis c/w injections


pitting edema in arms and legs


no movement on left side


Skin: no rash





Dx/Plan


(1) PEG tube malfunction


Code(s): K94.23 - GASTROSTOMY MALFUNCTION   Status: Acute   





(2) Dysphagia


Code(s): R13.10 - DYSPHAGIA, UNSPECIFIED   Status: Acute   


Qualifiers: 


   Dysphagia type: unspecified   Qualified Code(s): R13.10 - Dysphagia, 

unspecified   


Comment: s/p peg   





(3) Hemiplegia affecting left nondominant side


Code(s): G81.94 - HEMIPLEGIA, UNSPECIFIED AFFECTING LEFT NONDOMINANT SIDE   

Status: Acute   


Qualifiers: 


   Hemiplegia type: flaccid 





(4) Metabolic acidosis


Code(s): E87.2 - ACIDOSIS   Status: Acute   





(5) CKD (chronic kidney disease) stage 4, GFR 15-29 ml/min


Code(s): N18.4 - CHRONIC KIDNEY DISEASE, STAGE 4 (SEVERE)   Status: Chronic   





(6) Diabetes


Code(s): E11.9 - TYPE 2 DIABETES MELLITUS WITHOUT COMPLICATIONS   Status: 

Chronic   


Qualifiers: 


   Diabetes mellitus type: type 2   Diabetes mellitus long term insulin use: 

without long term use   Diabetes mellitus complication status: with kidney 

complications   Diabetes mellitus complication detail: with chronic kidney 

disease   Chronic kidney disease stage: stage 4 (severe)   Qualified Code(s): 

E11.22 - Type 2 diabetes mellitus with diabetic chronic kidney disease; N18.4 - 

Chronic kidney disease, stage 4 (severe); N18.4 - Chronic kidney disease, stage 

4 (severe); N18.4 - Chronic kidney disease, stage 4 (severe); N18.4 - Chronic 

kidney disease, stage 4 (severe)   





(7) Hyperlipidemia


Code(s): E78.5 - HYPERLIPIDEMIA, UNSPECIFIED   Status: Chronic   


Qualifiers: 


   Hyperlipidemia type: unspecified   Qualified Code(s): E78.5 - Hyperlipidemia

, unspecified   





(8) Hypertension


Code(s): I10 - ESSENTIAL (PRIMARY) HYPERTENSION   Status: Chronic   


Qualifiers: 


   Hypertension type: essential hypertension   Qualified Code(s): I10 - 

Essential (primary) hypertension   





(9) Hypokalemia


Code(s): E87.6 - HYPOKALEMIA   Status: Resolved   





(10) Thrombocytopenia


Code(s): D69.6 - THROMBOCYTOPENIA, UNSPECIFIED   Status: Acute   





- Plan





* Pt with abdominal wall wound vac and has been on zosyn IV since admission - 

will consult ID with regards to antibiotics and duration.  In the meantime will 

change to Cefepime with renal dosing due to the urine culture with enterobacter 

resistant to zosyn


* 


* appreciate Gen Surgery consult - CT ordered yesterday which shows severe 

edema of the stomach, await further guidance, to be seen by Dr. Petersen today


* 


* Appreciate Nephrology managing fluid status and renal function.  Pt with 

edema and ascites - can be diuresed with lasix if sx, however hold for now to 

avoid worsening renal function.


* 


* thrombocytopenia - discussed with Dr. Ludwig and Dr. Moreno - no indication 

to continue plavix.  Will continue with full dose aspirin with goal of 

secondary stroke prevention.  Given how complicated the patient is, will also 

request Hematology consult


* 


* BP - goal is likely 140-160


* 


* hemorrhoids - preparation h


* loose stools - start florastor


* DM not well controlled - increase lantus to 18 units at night


* dobhoff placement to be confirmed with xray, resume tube feeds as able


* change rectal aspirin to oral


* 


* dvt prophy - scd's due to thrombocytopenia


* gi prophy - change to protonix through PEG tube


* code status full


* 


* reviewed plan of care with RN.  Will review with family as they are available.

## 2018-08-02 NOTE — RAD
ABDOMEN ONE VIEW: 

8/2/18

 

HISTORY: 

NG tube placement. 

 

FINDINGS/IMPRESSION:  

There is an NG tube in the projection of the stomach in the left upper quadrant. There are postoperat
makenzie changes in the left hemiabdomen. The bowel gas pattern is unremarkable. A calcified uterine fibro
id is present. 

 

POS: [facility]

## 2018-08-02 NOTE — PRG
DATE OF SERVICE:  08/02/2018

 

SUBJECTIVE:  The patient remains in the IMCU.  She moans almost continuously, but is able to answer s
hort questions.  She says she feels okay and feels like she is stronger.

 

PHYSICAL EXAMINATION:

VITAL SIGNS:  Temperature is 98.9 with a T-max of 99.1, pulse 87, respiration 20, O2 sat 97% on 2 lit
ers, blood pressure 176/61.

HEENT:  Unremarkable.

NECK:  No JVD.

CHEST:  Fairly clear.

CARDIOVASCULAR:  S1, S2 regular.

ABDOMEN:  Soft.  Wound VAC noted.

EXTREMITIES:  Generalized edema throughout.

 

LABORATORY DATA:  White blood cell count 8.5, hemoglobin 8.2, hematocrit 23.5, platelet count 84.  So
dium 138, potassium 3.5, chloride 111, CO2 16, BUN 54, creatinine 3.6, glucose 229.

 

ASSESSMENT:

1.  Stable pulmonary status.

2.  Status post stroke.

3.  Non-anion gap metabolic acidosis.

4.  Chronic renal failure.

5.  PEG tube issues.

 

PLAN:  She remains stable from a Pulmonary and Critical Care standpoint and really can go out to the 
stroke floor from my standpoint.  I think that there are some family issues with administration that 
may need to be worked up before that happens. I will be happy to follow along with you.

## 2018-08-02 NOTE — PRG
DATE OF SERVICE:  08/02/2018

 

SUBJECTIVE:  Ms. Purdy is a 73-year-old black female being followed up for her chronic renal failu
re.  She recently was admitted for CVA.  She has had labile hypertension.  She has now acceptable  BP
 readings.  She is currently also on a tube feeding.  The PEG tube is not being used, but she has a t
emporary NG tube.

 

She has also been anemic and has been placed on maintenance Epogen.  This morning, no new complaints.


 

PHYSICAL EXAMINATION:

VITAL SIGNS:  Blood pressure 173/57, heart rate 87, respiratory rate 20, temperature 98.9, pulse ox 9
7%.

GENERAL:  Noted to be awake, alert, responsive to verbal stimuli.  Can follow commands. 

SKIN:  Adequate turgor. 

HEENT:  Slightly pale conjunctivae, anicteric sclerae.

NECK:  No neck mass, no carotid bruits, no JVD.

CHEST:  No deformities.

LUNGS:  Clear breath sounds.

HEART:  Normal sinus rhythm.  No murmur, no gallops, no rubs.

ABDOMEN:  Globular, soft, nontender, no masses.  Positive for PEG tube.

EXTREMITIES:  No edema, no deformities.

 

MEDICATIONS:  08/02/2018 - Reviewed.

 

LABORATORY:  08/02/2018 - White count 8.5, hemoglobin 8.2.  Sodium 138, potassium 3.5, 111, carbon di
oxide 16, BUN 54, creatinine 3.68, calcium 8.3. 

 

08/01/2018 - CT scan of the abdomen and pelvis;  moderate sized layering pleural effusion.  Hepatic c
ontour is normal.  There was contrast extending to the sinus tract to the skin from the greater curva
ture of the stomach.

 

08/02/2018 - BUN is 54, creatinine 3.68, potassium 3.5, carbon dioxide 16.

 

ASSESSMENT:  

1.  Chronic renal failure - secondary to hypertensive nephropathy.  Creatinine noted at 3.68.  This i
s relatively stable.  The patient has been changed to half normal saline to run at 100 mL per hour.  
The slightly higher creatinine may reflect some prerenal component.  No indication for any acute dial
ytic intervention.

2.  Status post cerebrovascular accident with left hemiplegia.  Continue supportive care.

3.  Labile hypertension continuing current blood pressure medications.

4.  Metabolic acidosis.  If this further worsens, consider starting on sodium bicarbonate 650 mg 1 ta
b t.i.d.

5.  Anemia on weekly Epogen. 

 

Continue current supportive care.

## 2018-08-02 NOTE — RAD
KUB: 

 

Date: 8-2-18 

 

Provided Clinical History: 

Dobbhoff tube placement. 

 

Comparison: 

7-31-18

 

FINDINGS: 

Enteric catheter is noted, the tip of which overlies the right mid abdomen. The abdominal bowel gas p
attern is nonspecific. 

 

IMPRESSION: 

As above. 

 

POS: [facility]

## 2018-08-02 NOTE — RAD
PORTABLE CHEST ONE VIEW:

8/2/18 at 7:19 p.m.

 

HISTORY: 

Respiratory failure. 

 

FINDINGS/IMPRESSION:  

Comparison is made with earlier exam of 3:42 p.m. from the same date. 

 

Interval placement of an endotracheal tube is seen with tip at the level of the clavicular heads. The
 nasogastric tube can be traced into the stomach with tip excluded from the film. There is a right in
ternal jugular double lumen venous catheter with tips in the projection of the right atrium. no  pneu
mothoraces are seen. The heart size is enlarged. There is pulmonary vascular congestion. There are bi
basilar infiltrates, right greater than left with probable accompanying small effusions. There is a l
eft internal jugular central venous catheter with tip in the projection of the right atrium. No pneum
othorax is seen. 

 

 

 

POS: [facility]

## 2018-08-02 NOTE — PDOC.EVN
Event Note





- Event Note


Event Note: 





Pt to the OR this afternoon and then anticipated to go to the ICU.  Will lower 

the lantus tonight due to uncertainty with fluids, blood sugars, if PEG or 

dobhoff will be available for tube feeds with the goal of avoiding 

hypoglycemia.

## 2018-08-02 NOTE — RAD
CHEST ONE VIEW:

8/2/18

 

HISTORY: 

Dyspnea. 

 

COMPARISON:  

7/31/18.

 

FINDINGS:  

The cardiac silhouette is magnified and enlarged. Pulmonary vasculature is slightly more engorged. Me
diastinum is midline. No confluent air space consolidation or evidence of pneumothorax. Cardiac monit
or leads overlie  the chest.

 

IMPRESSION:  

Cardiomegaly with mild pulmonary vascular congestion. 

 

POS: [facility]

## 2018-08-02 NOTE — PRG
DATE OF SERVICE:  08/02/2018

 

SUBJECTIVE:  Mr. Purdy is in IMCU.  Since I placed her new PEG tube on 07/26/2018 and removed her 
old PEG tube and oversewed the old PEG tube site after debriding necrotic skin and subcutaneous tissu
e and then place absorbable PDS sutures to close the fascial defect to minimize leakage.  The patient
 has had a CAT scan demonstrating that the new PEG tube divorces the far left lobe of the liver, whic
h is very small in caliber.  The patient has had some leakage of her old PEG tube site managed by yasmeen
cing a postpyloric Dobbhoff tube and administering feeding tube while placing the new PEG tube to gra
vity drainage.  This morning, it is noted that the tube feedings through her Dobbhoff tube are draini
ng out her PEG to gravity drainage and x-rays this morning revealed the Dobbhoff to be in the distal 
stomach.  Nurses report that the tape has ____ on the nose.  The tube had withdrawn 6-8 inches corres
ponding to the above findings.  Wound VAC is over the old PEG tube site.  Abdomen is soft.  Past ches
t x-rays revealed the stomach wall edema.

 

ASSESSMENT AND PLAN:  Abdominal wound with sinus tract to the stomach.  Would treat this with a wound
 VAC as we are doing this should close with time.  We will view the wound with the next VAC change wi
th wound care.

 

The patient's Dobbhoff tube had withdrawn into the stomach and will ask x-ray to replace this under f
luoroscopic visualization again postpyloric and secure the tube better and restrain the patient to pr
event dislodgement.

 

At this point, Dr. Ribera has suggested removing the new PEG tube after the tract was matured, but I do
 not think this is necessary.  This tube can be used as it is, the fact that it traverses the left lo
be of the liver, it is of no consequence and this portion of the liver is very small in caliber and s
hould provide no problems in the future.  I would continue to use this tube as it is.

## 2018-08-03 LAB
ALBUMIN SERPL BCG-MCNC: 2.3 G/DL (ref 3.4–4.8)
ALP SERPL-CCNC: 107 U/L (ref 40–150)
ALT SERPL W P-5'-P-CCNC: 20 U/L (ref 8–55)
ANALYZER IN CARDIO: (no result)
ANALYZER IN CARDIO: (no result)
ANION GAP SERPL CALC-SCNC: 15 MMOL/L (ref 10–20)
ANION GAP SERPL CALC-SCNC: 15 MMOL/L (ref 10–20)
APTT PPP: 38.3 SEC (ref 22.9–36.1)
AST SERPL-CCNC: 34 U/L (ref 5–34)
BASE EXCESS STD BLDA CALC-SCNC: -10.6 MEQ/L
BASE EXCESS STD BLDA CALC-SCNC: -9.6 MEQ/L
BASOPHILS # BLD AUTO: 0 THOU/UL (ref 0–0.2)
BASOPHILS # BLD AUTO: 0 THOU/UL (ref 0–0.2)
BASOPHILS NFR BLD AUTO: 0 % (ref 0–1)
BASOPHILS NFR BLD AUTO: 0.2 % (ref 0–1)
BILIRUB SERPL-MCNC: 0.8 MG/DL (ref 0.2–1.2)
BUN SERPL-MCNC: 53 MG/DL (ref 9.8–20.1)
BUN SERPL-MCNC: 55 MG/DL (ref 9.8–20.1)
CA-I BLDA-SCNC: 1.1 MMOL/L (ref 1.12–1.3)
CA-I BLDA-SCNC: 1.1 MMOL/L (ref 1.12–1.3)
CALCIUM SERPL-MCNC: 7.4 MG/DL (ref 7.8–10.44)
CALCIUM SERPL-MCNC: 8 MG/DL (ref 7.8–10.44)
CHD RISK SERPL-RTO: 5.3 (ref ?–4.5)
CHLORIDE SERPL-SCNC: 111 MMOL/L (ref 98–107)
CHLORIDE SERPL-SCNC: 113 MMOL/L (ref 98–107)
CHOLEST SERPL-MCNC: 80 MG/DL
CO2 SERPL-SCNC: 14 MMOL/L (ref 23–31)
CO2 SERPL-SCNC: 15 MMOL/L (ref 23–31)
CREAT CL PREDICTED SERPL C-G-VRATE: 20 ML/MIN (ref 70–130)
CREAT CL PREDICTED SERPL C-G-VRATE: 21 ML/MIN (ref 70–130)
EOSINOPHIL # BLD AUTO: 0 THOU/UL (ref 0–0.7)
EOSINOPHIL # BLD AUTO: 0.1 THOU/UL (ref 0–0.7)
EOSINOPHIL NFR BLD AUTO: 0.6 % (ref 0–10)
EOSINOPHIL NFR BLD AUTO: 0.6 % (ref 0–10)
GLOBULIN SER CALC-MCNC: 1.9 G/DL (ref 2.4–3.5)
GLUCOSE SERPL-MCNC: 141 MG/DL (ref 83–110)
GLUCOSE SERPL-MCNC: 154 MG/DL (ref 83–110)
HCO3 BLDA-SCNC: 13.2 MEQ/L (ref 22–28)
HCO3 BLDA-SCNC: 14.6 MEQ/L (ref 22–28)
HCT VFR BLDA CALC: 25 % (ref 36–47)
HCT VFR BLDA CALC: 27 % (ref 36–47)
HDLC SERPL-MCNC: 15 MG/DL
HGB BLD-MCNC: 9 G/DL (ref 12–16)
HGB BLD-MCNC: 9.4 G/DL (ref 12–16)
HGB BLDA-MCNC: 8.4 G/DL (ref 12–16)
HGB BLDA-MCNC: 9.3 G/DL (ref 12–16)
INR PPP: 1.5
LDLC SERPL CALC-MCNC: 29 MG/DL
LYMPHOCYTES # BLD: 0.9 THOU/UL (ref 1.2–3.4)
LYMPHOCYTES # BLD: 1.3 THOU/UL (ref 1.2–3.4)
LYMPHOCYTES NFR BLD AUTO: 10.3 % (ref 21–51)
LYMPHOCYTES NFR BLD AUTO: 10.5 % (ref 21–51)
MAGNESIUM SERPL-MCNC: 1.8 MG/DL (ref 1.6–2.6)
MCH RBC QN AUTO: 31.2 PG (ref 27–31)
MCH RBC QN AUTO: 31.5 PG (ref 27–31)
MCV RBC AUTO: 88.5 FL (ref 78–98)
MCV RBC AUTO: 89.8 FL (ref 78–98)
MONOCYTES # BLD AUTO: 0.6 THOU/UL (ref 0.11–0.59)
MONOCYTES # BLD AUTO: 1 THOU/UL (ref 0.11–0.59)
MONOCYTES NFR BLD AUTO: 6.3 % (ref 0–10)
MONOCYTES NFR BLD AUTO: 7.9 % (ref 0–10)
NEUTROPHILS # BLD AUTO: 10.3 THOU/UL (ref 1.4–6.5)
NEUTROPHILS # BLD AUTO: 7.2 THOU/UL (ref 1.4–6.5)
NEUTROPHILS NFR BLD AUTO: 80.9 % (ref 42–75)
NEUTROPHILS NFR BLD AUTO: 82.7 % (ref 42–75)
PCO2 BLDA: 23.1 MMHG (ref 35–45)
PCO2 BLDA: 26.4 MMHG (ref 35–45)
PH BLDA: 7.36 [PH] (ref 7.35–7.45)
PH BLDA: 7.37 [PH] (ref 7.35–7.45)
PLATELET # BLD AUTO: 82 THOU/UL (ref 130–400)
PLATELET # BLD AUTO: 90 THOU/UL (ref 130–400)
PO2 BLDA: 109.5 MMHG (ref 70–?)
PO2 BLDA: 89.3 MMHG (ref 70–?)
POTASSIUM BLD-SCNC: 3.8 MMOL/L (ref 3.7–5.3)
POTASSIUM BLD-SCNC: 4 MMOL/L (ref 3.7–5.3)
POTASSIUM SERPL-SCNC: 3.9 MMOL/L (ref 3.5–5.1)
POTASSIUM SERPL-SCNC: 3.9 MMOL/L (ref 3.5–5.1)
PROTHROMBIN TIME: 18 SEC (ref 12–14.7)
RBC # BLD AUTO: 2.89 MILL/UL (ref 4.2–5.4)
RBC # BLD AUTO: 2.97 MILL/UL (ref 4.2–5.4)
SODIUM SERPL-SCNC: 137 MMOL/L (ref 136–145)
SODIUM SERPL-SCNC: 138 MMOL/L (ref 136–145)
SPECIMEN DRAWN FROM PATIENT: (no result)
SPECIMEN DRAWN FROM PATIENT: (no result)
TRIGL SERPL-MCNC: 182 MG/DL (ref ?–150)
WBC # BLD AUTO: 12.7 THOU/UL (ref 4.8–10.8)
WBC # BLD AUTO: 8.8 THOU/UL (ref 4.8–10.8)

## 2018-08-03 PROCEDURE — 0BH17EZ INSERTION OF ENDOTRACHEAL AIRWAY INTO TRACHEA, VIA NATURAL OR ARTIFICIAL OPENING: ICD-10-PCS | Performed by: INTERNAL MEDICINE

## 2018-08-03 PROCEDURE — 5A1955Z RESPIRATORY VENTILATION, GREATER THAN 96 CONSECUTIVE HOURS: ICD-10-PCS | Performed by: INTERNAL MEDICINE

## 2018-08-03 RX ADMIN — ALBUTEROL SULFATE SCH MG: 2.5 SOLUTION RESPIRATORY (INHALATION) at 01:30

## 2018-08-03 RX ADMIN — INSULIN GLARGINE SCH: 100 INJECTION, SOLUTION SUBCUTANEOUS at 21:12

## 2018-08-03 RX ADMIN — DEXTROSE MONOHYDRATE SCH MLS: 70 INJECTION, SOLUTION INTRAVENOUS at 22:26

## 2018-08-03 RX ADMIN — Medication SCH ML: at 08:43

## 2018-08-03 RX ADMIN — ALBUTEROL SULFATE SCH MG: 2.5 SOLUTION RESPIRATORY (INHALATION) at 18:48

## 2018-08-03 RX ADMIN — HYDROCORTISONE SCH: 10 CREAM TOPICAL at 08:43

## 2018-08-03 RX ADMIN — HYDROCORTISONE SCH: 10 CREAM TOPICAL at 21:12

## 2018-08-03 RX ADMIN — ALBUTEROL SULFATE SCH MG: 2.5 SOLUTION RESPIRATORY (INHALATION) at 14:01

## 2018-08-03 RX ADMIN — Medication SCH ML: at 21:13

## 2018-08-03 RX ADMIN — NIFEDIPINE SCH: 60 TABLET, EXTENDED RELEASE ORAL at 10:38

## 2018-08-03 RX ADMIN — ALBUTEROL SULFATE SCH MG: 2.5 SOLUTION RESPIRATORY (INHALATION) at 07:12

## 2018-08-03 NOTE — OP
DATE OF OPERATION:  08/02/2018

 

PREOPERATIVE DIAGNOSES:  Stroke, dysphagia, dysfunctional percutaneous endoscopic gastrostomy tube, g
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
m previous chronic kidney disease, poor IV access, placement of a midline catheter in a patient with 
chronic kidney disease in need of immediate removal.

 

POSTOPERATIVE DIAGNOSES:  Stroke, dysphagia, dysfunctional percutaneous endoscopic gastrostomy tube, 
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
om previous chronic kidney disease, poor IV access, placement of a midline catheter in a patient with
 chronic kidney disease in need of immediate removal.

 

PROCEDURES:  Diagnostic laparoscopy, laparoscopic closure of gastrocutaneous fistula, laparoscopic cl
osure with 0 V-Loc suture of peritoneum to the old gastrocutaneous fistula site (old percutaneous end
oscopic gastrostomy site), debridement of skin and subcutaneous tissue at the old percutaneous endosc
opic gastrostomy site.  Takedown of liver from around the new G-tube site.  Upper endoscopy noting ab
sence of any gastric leakage.  Ultrasound-guided placement of right IJ hemodialysis catheter, left IJ
 triple lumen catheter, fluoroscopic assisted.

 

SURGEON:  Jean-Paul Petersen MD

 

ANESTHESIA:  General.

 

PROCEDURE IN DETAIL:  The patient was taken to the operating room under general anesthesia.  Neck, ch
est, and abdomen were prepared with ChloraPrep, draped in routine fashion.  Using ultrasound guidance
, the right and left internal jugular veins were cannulated with trocar catheters and J-wire threaded
.  Trocar catheter removed.  Skin incised and enlarged sharply at J-wire entrance site and stab incis
ion was made over the right chest.  Using Seldinger technique, a triple-lumen catheter placed in the 
left internal jugular vein and secured with 3-0 nylon suture and Biopatch.  On the right side, the an
giodynamics cuffed tunnel hemodialysis catheter tunneled using the tunneling device with the fabric c
uff just beneath the skin exit site and catheter secured with 2 interrupted sutures of 3-0 nylon and 
Biopatch sterile dressing was applied.  Small and medium sized dilators placed over the J-wire into t
he internal jugular vein and removed.  Dilator and pull-away sheath placed over the J-wire into the s
uperior vena cava and J wire and dilator were removed.  Catheter placed through pull-away sheath and 
the pull-away sheath removed.  Platysma approximated with 0 Vicryl, skin with subdermal 4-0 Monocryl,
 and DermaGlue applied.  Fluoroscopic images revealed good line placement.  Each port aspirated blood
 and flushed with saline solution and an IJ catheter flushed with heparinized saline solution 1000 un
its heparin per mL indicated volume of the port.

 

Infraumbilical incision was made.  Pneumoperitoneum obtained with the Veress needle, replacing it wit
h a 5 port and laparoscope inserted.  Right lateral subcostal incision was made and a 5 port placed. 
 Right lower quadrant incision was made and a 5 port placed.  Right lateral mid abdominal incision wa
s made and a 12 port placed.  Stomach was inspected and abdominal contents was inspected, there were 
some free fluid, but there were no signs of any inflammation in her peritonitis and certainly no sign
s of tube feedings as suspected from preoperative contrast study of Dobbhoff tube, this suggested a l
eakage of the stomach.  I did take down laparoscopically the old G-tube site where she had a previous
 gastrocutaneous fistula and I did mobilize this and cleared the fat from the serosa of the stomach a
nd closed this with a fire of the DANIE stapler.  After 2 fires of blue-load stapler, the staple line w
as hemostatic and this was assured with clips.  Area irrigated at the old gastrocutaneous fistula sit
e, 0 V-Loc suture was run continuous closing the peritoneum and fascia intra-abdominally.  This was _
____(04:14) suture.  It was then noted that the new G-tube site had traversed the left lateral edge o
f the liver and a small slit in the liver was made using cautery at 80 coleman, freeing the liver from 
this spot and then snugging the G-tube to the abdominal wall by the G-tube fixation device.  This was
 inspected and noted to be in good position.  Endoscope then placed per os under direct visualization
 and noted the G-tube device within the gastric lumen and the scope passed down in the distal stomach
 which appeared slightly edematous, but it did insufflate, understand the scope was advanced towards 
the pylorus, but could never stay in the stomach well as it would not hold insufflation.  During this
 the stomach did stand at one point very well throughout the stomach and this was inspected laparosco
pically and there were no leaks.  A nasogastric tube was then placed by Anesthesia.  The endoscope wa
s removed.  Staple line of the stomach where the old gastrocutaneous fistula site was closed, it was 
inspected and noted be hemostatic.  Mayda was sprayed over this area.  Irrigant evacuated, pneumoper
itoneum evacuated after 12-mm port site closed with 0 Vicryl GraNee needle.  Laparoscopic sites appro
ximated with staples and the skin and subcutaneous tissue around the gastrocutaneous fistula site fro
m the old G-tube was debrided sharply and PDS sutures were placed where necessary to help to secure t
he fascia.  A gauze dressing applied.  Wound VAC to be applied tomorrow.  Patient tolerated the proce
dure well and she was transferred to the ICU in stable condition.  Preoperative hemoglobin was 8, she
 was given 2 units of blood.

## 2018-08-03 NOTE — CON
DATE OF CONSULTATION:  08/02/2018

 

REASON FOR CONSULTATION:  Complications following placement of gastrostomy tube 
with an infection around the gastrostomy site.

 

HISTORY OF PRESENT ILLNESS:  A 73-year-old patient with a history of type 2 
diabetes mellitus, hypertension, and prior CVA, who had a gastrostomy tube 
placed and was transferred to rehab facility.  At the nursing home, the 
gastrostomy tube was leaking significantly and patient was complaining of 
abdominal pain and had coffee-ground emesis.  Therefore, she was shipped to the 
emergency room.  On arrival, there was obvious leakage of fluid around the 
gastrostomy tube.  Patient had an abdomen and pelvis CT, which demonstrated 
extension of contrast from the stomach lumen into the sinus tract of the 
gastrostomy with severe edema of the stomach, some layering of pleural 
effusions.  General Surgery was consulted and patient had a removal of the 
original gastrostomy tube with placement of a new tube in a new location.  The 
skin and their required debridements down to fascia level and the fascia was 
approximated with suture.  Patient was given Zosyn, because of the abdominal 
wall changes.  She had a Dobbhoff tube placed.  There was evidence of contrast 
in the gastric area, although there was evidence of contrast extension outside 
the lumen and the contrast was extravasating into the peritoneal cavity.  The 
patient has been taken to the OR for evaluation of this area of the stomach and 
repair if necessary.  Ms. Purdy was somewhat drowsy.  She denied any 
headaches, no respiratory symptoms.  She had some abdominal pain in the upper 
segments of her abdomen.  Patient has a indwelling Bennett catheter.  She has a 
peripheral IV access.

 

PAST MEDICAL HISTORY:  Prior to this visit includes a CVA, diabetes mellitus 
type 2, hypertension, gastrostomy tube placement with the complications noted 
above.

 

SOCIAL HISTORY:  Nursing home resident.  Never smoker.

 

FAMILY HISTORY:  Noncontributory.

 

ALLERGIES:  None.

 

CURRENT MEDICATIONS:  Patient is on Tylenol, Norco, Norvasc, Ecotrin, Lipitor, 
Coreg, cefepime, Catapres, clonidine, hydralazine, insulin, scopolamine, 
Saccharomyces, tag pantoprazole and ondansetron.

 

PHYSICAL EXAMINATION:

VITAL SIGNS:  T-max 98.9, blood pressure 140/60, pulse 87, respirations 20, O2 
sat 100.

SKIN:  Demonstrates the area of ulceration with necrotic wound opening and 
measuring about 3 cm x 2.5 cm, there is a mild area of erythema surrounding 
this region.  Patient is awake, but a little bit disoriented.  She follows some 
commands.  She has a peripheral IV access and a Bennett catheter.  I's and O's 
have been positive for the past many days.

HEENT:  Ocular movements conjugate.  Pale conjunctivae. Oral cavity not 
remarkable.

NECK:  Supple, no jugular vein distention.

LUNGS:  Symmetric air entry.

HEART:  S1, S2, regular rate.

ABDOMEN:  With the wound dressed at this time, the dressing was not removed.  
Patient still has the new gastrostomy tube in place.  She has a Dobbhoff 
inserted to allow feeding until the old gastrostomy tract heels.  Patient is in 
moderate distention, but no ascites noted.  No bladder distention.

EXTREMITIES:  She is able to move extremities on command, but she is diffusely 
weak.

NEUROLOGIC:  She is awake, a little bit confused, and follows some commands.

 

LABORATORY DATA:  White cell count is at 8.5, hemoglobin 8.2, MCV 88, platelets 
84,000, 78% neutrophils.  INR 1.5, pH 7.4, pCO2 of 28, pO2 of 81.  Sodium 138, 
creatinine 3.68, this is about her baseline.  The phosphorus 5.4, bilirubin 
0.8.  AST 38, ALT 28, alkaline phosphatase 217, albumin 4.1, globulin 2.6.  
Urinalysis with 4-6 wbc's.  The imaging studies that beyond the ones that we 
discussed, it appears that the new gastrostomy catheter tract versus the left 
lobe of the liver.

 

ASSESSMENT:  Type 2 diabetes with hypertension and large middle cerebral artery 
distribution cerebrovascular accident with severe neurological impairment 
including swallowing dysfunction requiring PEG placement, which led to the 
complications noted above.

 

DISCUSSION:  Patient has a new PEG tube now which appears to traverse the left 
lobe of the liver.  The old tract appears to be leaking still into the abdomen, 
even after the repair and Dr. Petersen is going back into check this out and fix 
it.  Patient has been switched to cefepime.  She had been on Zosyn before.  
Most of the organisms retrieved from this kind of situation in nursing homes 
patients are Gram-negative rods, so I believe Cefepime should be adequate.  The 
duration of therapy will not be prolonged and will continue until there is a 
proper source control with the verification of closure of the leakage a few 
more days after that.

 

MTDD

## 2018-08-03 NOTE — RAD
FRONTAL VIEW CHEST: 

8/3/18

 

COMPARISON:  

Previous day.

 

INDICATION:

Respiratory failure, dyspnea.

 

FINDINGS:  

There is increasing pleural based density at the mid inferior right hemithorax. Tunneled right sided 
vascular catheter, enteric catheter, and left IJ catheter remain. Removal of prior endotracheal tube,
 when comparing to prior exam. There is patchy left perihilar opacity. The cardiac silhouette is part
ially obscured. 

 

IMPRESSION:  

Progressive density at the inferior right hemithorax which may be related to component of collapse an
d/or adjacent progressive pleural fluid. 

 

Patchy left perihilar edema. 

 

Interval extubation. 

 

POS: [facility]

## 2018-08-03 NOTE — PDOC.PN
- Subjective


Encounter Start Date: 08/03/18 (f/u stroke)


Encounter Start Time: 10:40


Subjective: Pt hypotensive overnight, clonidine patch removed and bp


-: meds held.  Extubated this morning.  No issues this morning





- Objective


Resuscitation Status: 


 











Resuscitation Status           FULL:Full Resuscitation














Vital Signs & Weight: 


 Vital Signs (12 hours)











  Temp Pulse Pulse Resp BP BP Pulse Ox


 


 08/03/18 10:38   83    115/44 L  


 


 08/03/18 09:35    88    146/55 H 


 


 08/03/18 08:00  99.4 F    20   


 


 08/03/18 07:28   83    115/44 L  


 


 08/03/18 07:12   83   16    99


 


 08/03/18 06:00     16   


 


 08/03/18 04:17   80     


 


 08/03/18 04:00  99.3 F    16   


 


 08/03/18 02:00     16   


 


 08/03/18 01:30   80    123/52 L   100


 


 08/03/18 00:18  97.5 F L      


 


 08/03/18 00:00  97.5 F L    16   


 


 08/02/18 23:00  97.4 F L      














  Pulse Ox


 


 08/03/18 10:38 


 


 08/03/18 09:35  97


 


 08/03/18 08:00 


 


 08/03/18 07:28 


 


 08/03/18 07:12 


 


 08/03/18 06:00 


 


 08/03/18 04:17 


 


 08/03/18 04:00 


 


 08/03/18 02:00 


 


 08/03/18 01:30 


 


 08/03/18 00:18 


 


 08/03/18 00:00 


 


 08/02/18 23:00 








 Weight











Admit Weight                   194 lb 7.163 oz


 


Weight                         222 lb 0.088 oz











 Most Recent Monitor Data











Heart Rate from ECG            88


 


NIBP                           156/53


 


NIBP BP-Mean                   118


 


Respiration from ECG           17


 


SpO2                           98














I&O: 


 











 08/02/18 08/03/18 08/04/18





 06:59 06:59 06:59


 


Intake Total 2225 2559 


 


Output Total 975 20 20


 


Balance 1250 2539 -20











Result Diagrams: 


 08/03/18 05:17





 08/03/18 05:17


Additional Labs: 


 Accuchecks











  08/03/18 08/02/18 08/02/18





  05:27 21:00 11:56


 


POC Glucose  151 H  173 H  187 H











EKG Reviewed by me: Yes (sinus 80's)





Phys Exam





- Physical Examination


Constitutional: NAD


facial droop on left


Respiratory: no wheezing, no rales, no rhonchi


Cardiovascular: RRR, no significant murmur


Gastrointestinal: positive bowel sounds


abdominal wall edema.  surgical bandage in place, PEG tube


in place


edema throughout arms and legs


movement of right arm/hand and leg.  no movement on left


Skin: no rash





Dx/Plan


(1) PEG tube malfunction


Code(s): K94.23 - GASTROSTOMY MALFUNCTION   Status: Acute   





(2) Dysphagia


Code(s): R13.10 - DYSPHAGIA, UNSPECIFIED   Status: Acute   


Qualifiers: 


   Dysphagia type: unspecified   Qualified Code(s): R13.10 - Dysphagia, 

unspecified   


Comment: s/p peg   





(3) Hemiplegia affecting left nondominant side


Code(s): G81.94 - HEMIPLEGIA, UNSPECIFIED AFFECTING LEFT NONDOMINANT SIDE   

Status: Acute   


Qualifiers: 


   Hemiplegia type: flaccid 





(4) Metabolic acidosis


Code(s): E87.2 - ACIDOSIS   Status: Acute   





(5) CKD (chronic kidney disease) stage 4, GFR 15-29 ml/min


Code(s): N18.4 - CHRONIC KIDNEY DISEASE, STAGE 4 (SEVERE)   Status: Chronic   





(6) Diabetes


Code(s): E11.9 - TYPE 2 DIABETES MELLITUS WITHOUT COMPLICATIONS   Status: 

Chronic   


Qualifiers: 


   Diabetes mellitus type: type 2   Diabetes mellitus long term insulin use: 

without long term use   Diabetes mellitus complication status: with kidney 

complications   Diabetes mellitus complication detail: with chronic kidney 

disease   Chronic kidney disease stage: stage 4 (severe)   Qualified Code(s): 

E11.22 - Type 2 diabetes mellitus with diabetic chronic kidney disease; N18.4 - 

Chronic kidney disease, stage 4 (severe); N18.4 - Chronic kidney disease, stage 

4 (severe); N18.4 - Chronic kidney disease, stage 4 (severe); N18.4 - Chronic 

kidney disease, stage 4 (severe)   





(7) Hyperlipidemia


Code(s): E78.5 - HYPERLIPIDEMIA, UNSPECIFIED   Status: Chronic   


Qualifiers: 


   Hyperlipidemia type: unspecified   Qualified Code(s): E78.5 - Hyperlipidemia

, unspecified   





(8) Hypertension


Code(s): I10 - ESSENTIAL (PRIMARY) HYPERTENSION   Status: Chronic   


Qualifiers: 


   Hypertension type: essential hypertension   Qualified Code(s): I10 - 

Essential (primary) hypertension   





(9) Hypokalemia


Code(s): E87.6 - HYPOKALEMIA   Status: Resolved   





(10) Thrombocytopenia


Code(s): D69.6 - THROMBOCYTOPENIA, UNSPECIFIED   Status: Acute   





- Plan





* 


* Pt with abdominal wall wound vac now s/p surgery yesterday with Dr. Petersen.


* 


* Appreciate ID consult - continue cefepime


* =


* 


* Appreciate Nephrology managing fluid status and renal function.  Pt with 

edema and ascites - can be diuresed with lasix if sx, however hold for now to 

avoid worsening renal function.


* 


* thrombocytopenia - appreciate Hematology consult, which states anti-platelet 

medications can be continued unless platelets less than 40.  Will continue to 

hold the plavix - there is no added benefit per Neurology and pt at higher risk 

of bleeding, and Cardiology does not think it is essential -pt can be on 

aspirin alone.  I'm concerned about the additional risk of bleeding as well in 

this complicated current condition.


* 


* BP - goal is likely 140-160.  Will d/c the nifedipine as it cannot be crushed

, and pt is on amlodipine.  d/c clonidine for now - it was removed last night 

due to hypotension, add back when needed.  Pt to receive the isosorbide now.


* 


* hemorrhoids - preparation h


* loose stools - florastor


* DM - lantus reduced due to surgery and uncertainty with when feeds will 

return - will adjust as needed.  


* 


* dvt prophy - scd's due to thrombocytopenia


* gi prophy - protonix through PEG tube


* code status full


* 


* reviewed plan of care with RN.  


* Spoke with Palliative care and a family meeting will be arranged.


* 


* pt remains at high risk in current condition.

## 2018-08-03 NOTE — PRG
DATE OF SERVICE:  08/03/2018

 

SUBJECTIVE:  Ms. Purdy is a 73-year-old black female followed up by [facility] Service for her chronic 
renal failure.  Creatinine has been fluctuating.  Most recent creatinine is noted at 3.84 which is hi
gher from yesterday of 3.68.  GFR is 14 mL per minute.  She underwent exploratory surgery yesterday. 
 Dr. Petersen placed a new PEG tube.  In addition, she had also a laparoscopic closure of the old gastr
ocutaneous fistula.  A cuffed hemodialysis catheter has also been placed.  This morning she is noted 
to be stable.  She is awake and conversing with me.  Not in overt distress.

 

Please note this patient is status post cerebrovascular accident.

 

PHYSICAL EXAMINATION: 

VITAL SIGNS:  Blood pressure 145/53, heart rate 87, pulse ox 100%.

GENERAL:  Awake, alert, comfortable, not in overt distress.

SKIN:  Adequate turgor. 

HEENT:  Slightly pale conjunctivae, anicteric sclerae.

NECK:  No neck mass, no carotid bruits, no JVD.

CHEST:  No deformities.

LUNGS:  Decreased breath sounds.

HEART:  Normal sinus rhythm.  No murmur, no gallops, rubs.

ABDOMEN:  Globular, soft.  Positive for PEG.

EXTREMITIES:  Positive for edema.

 

MEDICATIONS:  08/03/2018 - Reviewed.

 

LABORATORY DATA:  08/03/2018 - Sodium 138, potassium 3.9, chloride 113, carbon dioxide 14, BUN 53, cr
eatinine 3.84, glucose 154, phosphorus 5.3, albumin 2.3.

 

ASSESSMENT AND PLAN: 

1.  Chronic renal failure - renal function is worsening.  Most recent creatinine is 3.8.  No indicati
on for any acute dialysis today.  We will time the dialysis.

2.  Anasarca.  The patient has significant ascites on imaging.  She has also leg edema.  My plan is a
gain to give a 1 time dose of Lasix 40 mg IV. 

3.  Anemia:  Continue current weekly Epogen, p.r.n. blood transfusion.

4.  Status post cerebrovascular accident.  Supportive care.

5.  Labile hypertension.  Continue current blood pressure meds.

## 2018-08-03 NOTE — PRG
DATE OF SERVICE:  08/03/2018

 

Ms. Purdy has become progressively tachypneic, I am told, this evening.

 

OBJECTIVE:  

VITAL SIGNS:  Heart rate was 103, when arrived blood pressure 187/74, 
respiratory rate was in the 30s to low 40s.

LUNGS:  Were remarkable for mild rhonchi.

HEART:  Regular rhythm.

ABDOMEN:  Soft and massively distended and tympanitic.

EXTREMITIES:  Without asymmetry or edema.

NEUROLOGIC:  No gross neurological deficits.

 

LABORATORY DATA:  White count this afternoon is 12.7, hemoglobin 9.4, platelets 
90,000.

 

Sodium 137, potassium 3.9, chloride 111, bicarbonate 15, BUN 55, creatinine 
4.14.

 

Blood gas shows pH 7.36, CO2 of 26, pO2 of 109.

 

Chest radiograph shows bilateral effusions.

 

IMPRESSION:  Impending respiratory failure.  I did not think she needed to be 
emergently intubated, but I have a strong suspicion that before tomorrow morning
, she will need to be intubated.  The issues include abdominal distention 
present up in her diaphragms, poor ability to handle secretions and metabolic 
acidosis on top of deconditioning.  She was agreeable to intubation.  I have 
also discussed with family after she was intubated and explained her plan of 
care.

 

Intake and output for the day were reviewed.  It appears that she had only 88 
mL of urine output today.

 

She will probably need dialysis first thing in the morning.

 

Critical care time was 30 minutes not including procedure.

 

HAYLEE

## 2018-08-04 LAB
ALBUMIN SERPL BCG-MCNC: 2.6 G/DL (ref 3.4–4.8)
ALP SERPL-CCNC: 132 U/L (ref 40–150)
ALT SERPL W P-5'-P-CCNC: 146 U/L (ref 8–55)
ANALYZER IN CARDIO: (no result)
ANION GAP SERPL CALC-SCNC: 17 MMOL/L (ref 10–20)
AST SERPL-CCNC: 162 U/L (ref 5–34)
BASE EXCESS STD BLDA CALC-SCNC: -10.6 MEQ/L
BILIRUB SERPL-MCNC: 0.4 MG/DL (ref 0.2–1.2)
BUN SERPL-MCNC: 58 MG/DL (ref 9.8–20.1)
CA-I BLDA-SCNC: 1.1 MMOL/L (ref 1.12–1.3)
CALCIUM SERPL-MCNC: 8.2 MG/DL (ref 7.8–10.44)
CHLORIDE SERPL-SCNC: 110 MMOL/L (ref 98–107)
CO2 SERPL-SCNC: 12 MMOL/L (ref 23–31)
CREAT CL PREDICTED SERPL C-G-VRATE: 19 ML/MIN (ref 70–130)
GLOBULIN SER CALC-MCNC: 2.6 G/DL (ref 2.4–3.5)
GLUCOSE SERPL-MCNC: 213 MG/DL (ref 83–110)
HBSAG INDEX: 0.22 S/CO (ref 0–0.99)
HBV SURFACE AB SERPL IA-ACNC: 4.16 MIU/ML
HCO3 BLDA-SCNC: 13.4 MEQ/L (ref 22–28)
HCT VFR BLDA CALC: 21 % (ref 36–47)
HGB BLD-MCNC: 7.9 G/DL (ref 12–16)
HGB BLDA-MCNC: 7.1 G/DL (ref 12–16)
MAGNESIUM SERPL-MCNC: 2 MG/DL (ref 1.6–2.6)
MCH RBC QN AUTO: 31.7 PG (ref 27–31)
MCV RBC AUTO: 88.2 FL (ref 78–98)
MDIFF COMPLETE?: YES
PCO2 BLDA: 23.4 MMHG (ref 35–45)
PH BLDA: 7.38 [PH] (ref 7.35–7.45)
PLATELET # BLD AUTO: 82 THOU/UL (ref 130–400)
PLATELET BLD QL SMEAR: (no result)
PO2 BLDA: 139.7 MMHG (ref 70–?)
POTASSIUM BLD-SCNC: 3.6 MMOL/L (ref 3.7–5.3)
POTASSIUM SERPL-SCNC: 3.7 MMOL/L (ref 3.5–5.1)
RBC # BLD AUTO: 2.48 MILL/UL (ref 4.2–5.4)
SODIUM SERPL-SCNC: 135 MMOL/L (ref 136–145)
SPECIMEN DRAWN FROM PATIENT: (no result)
WBC # BLD AUTO: 10.7 THOU/UL (ref 4.8–10.8)

## 2018-08-04 PROCEDURE — 5A1D70Z PERFORMANCE OF URINARY FILTRATION, INTERMITTENT, LESS THAN 6 HOURS PER DAY: ICD-10-PCS | Performed by: INTERNAL MEDICINE

## 2018-08-04 RX ADMIN — INSULIN LISPRO PRN UNIT: 100 INJECTION, SOLUTION INTRAVENOUS; SUBCUTANEOUS at 06:04

## 2018-08-04 RX ADMIN — ALBUTEROL SULFATE SCH MG: 2.5 SOLUTION RESPIRATORY (INHALATION) at 00:32

## 2018-08-04 RX ADMIN — ALBUTEROL SULFATE SCH MG: 2.5 SOLUTION RESPIRATORY (INHALATION) at 06:45

## 2018-08-04 RX ADMIN — HYDROCORTISONE SCH: 10 CREAM TOPICAL at 21:34

## 2018-08-04 RX ADMIN — CLONIDINE SCH MG: 0.3 PATCH TRANSDERMAL at 21:15

## 2018-08-04 RX ADMIN — DEXTROSE MONOHYDRATE SCH MLS: 70 INJECTION, SOLUTION INTRAVENOUS at 22:09

## 2018-08-04 RX ADMIN — INSULIN LISPRO PRN UNIT: 100 INJECTION, SOLUTION INTRAVENOUS; SUBCUTANEOUS at 16:53

## 2018-08-04 RX ADMIN — INSULIN LISPRO PRN UNIT: 100 INJECTION, SOLUTION INTRAVENOUS; SUBCUTANEOUS at 11:05

## 2018-08-04 RX ADMIN — INSULIN LISPRO PRN UNIT: 100 INJECTION, SOLUTION INTRAVENOUS; SUBCUTANEOUS at 00:53

## 2018-08-04 RX ADMIN — ALBUTEROL SULFATE SCH MG: 2.5 SOLUTION RESPIRATORY (INHALATION) at 18:13

## 2018-08-04 RX ADMIN — Medication SCH ML: at 20:30

## 2018-08-04 RX ADMIN — ALBUTEROL SULFATE SCH MG: 2.5 SOLUTION RESPIRATORY (INHALATION) at 12:46

## 2018-08-04 RX ADMIN — HYDROCORTISONE SCH: 10 CREAM TOPICAL at 07:46

## 2018-08-04 RX ADMIN — Medication SCH ML: at 07:46

## 2018-08-04 NOTE — OP
DATE OF SERVICE: 08/03/18



Ms. Purdy's bite block was placed in her mouth.  Throat was sprayed with 
Cetacaine spray.  With her in the sitting position was gently flexed upward.  
Her vocal cords were easily visualized.  Bronchoscope was passed into her 
cords.  The first 6 inches of her trachea was remarkable for salivary type 
secretions that were partially occluding her trachea.  This was all suction 
cleared, and then the endotracheal tube was advanced and secured at 23 to 24 
cm.  No distal secretions or aspirated gastric contents were seen.  She 
tolerated intubation.  She was given 50 mg propofol bolus.  Blood pressure 
dropped to the 130s with intubation.  She went to sleep and appeared very 
comfortable.  I have explained to the the family, which she will likely be 
intubated for several more days.

 

HAYLEE

## 2018-08-04 NOTE — PDOC.EVN
Event Note





- Event Note


Event Note: 





reviewed blood sugars - in the 200's - add 10 units lantus tonight and continue 

to titrate.  Blood pressures also remain elevated - change to 0.3 mg clonidine 

patch. Discussed this with RN tonight, no questions or further needs.

## 2018-08-04 NOTE — PRG
DATE OF CONSULTATION:  08/04/2018

 

RENAL MEDICINE

 

SUBJECTIVE:  Ms. Purdy is a 73-year-old black female being followed by the Renal Service for her c
hronic renal failure.  Renal function has been worsening.  Yesterday, due to respiratory distress, renetta mejia has been intubated.  She was given Lasix yesterday without any significant urine output.  She may h
ave now progressed with renal dysfunction.  Dialysis catheter was inserted by Dr. Petersen yesterday.  
My plan is to do a 1 hour hemodialysis with her today, using no heparin and we are attempting to rocael
ve one liter as tolerated.

 

OBJECTIVE:

VITAL SIGNS:  Blood pressure is noted at 152/47, heart rate 84, O2 sat 98%.

GENERAL:  The patient is sedated, intubated on ventilator support.

SKIN:  Adequate turgor.

HEENT:  Slightly pale conjunctivae, anicteric sclerae.

NECK:  No neck mass, no carotid bruits, no JVD.

CHEST:  No deformities.

LUNGS:  Decreased breath sounds.

HEART:  Normal sinus rhythm.  No murmurs, no gallops, no rubs.

ABDOMEN:  Globular, soft, nontender.  Positive for surgical scar.

EXTREMITIES:  Trace edema.

 

MEDICATIONS:  Medications of 08/04/2018 was reviewed.

 

LABORATORY DATA:  Laboratories of 08/04/2018; white count 10.7, hemoglobin 7.9.  Sodium 135, potassiu
m 3.7, chloride 110, carbon dioxide 12, BUN 58, creatinine 4.35, phosphorus 5.5, ,  and
 albumin 2.6.

 

ASSESSMENT AND PLAN:

1.  Acute kidney injury/chronic renal failure, worsening renal dysfunction.  There is worsening renal
 dysfunction over the last several days.  Due to the volume overload and worsening renal function, I 
have decided to initiate hemodialysis.  We will do a 1 hour hemodialysis today and do again 2-hour he
modialysis.  I have scheduled her for a daily dialysis for the next several days.  Overall, prognosis
 remains guarded with this patient.

2.  Labile hypertension, fluid removal with dialysis.

3.  Acute respiratory failure, hemodialysis and fluid removal today.

4.  Status post acute abdomen - status post exploratory laparotomy with kierra Fox.

5.  Anemia, on weekly Epogen, p.r.n. blood transfusion.

## 2018-08-04 NOTE — PRG
DATE OF SERVICE:  08/04/2018

 

SUBJECTIVE:  Simon Purdy is doing well today.  She remains off the ventilator.

 

OBJECTIVE:

VITAL SIGNS:  88, 16, 171/54.

LUNGS:  Clear to auscultation.  Mild rhonchi at base.

CARDIAC:  Regular rate and rhythm.

ABDOMEN:  Soft.

EXTREMITIES:  Edema all extremities.

 

Gastric drainage 24 hours 500 mL.  PEG tube to gravity drainage 95 mL.  Bennett 301 mL 24 hours.

 

LABORATORY DATA:  White count 10, hemoglobin 7.9, sodium 135, potassium 3.7, carbon dioxide 12, creat
inine 4.35, BUN 58.

 

ASSESSMENT AND PLAN:

1.  Chronic disease, end-stage renal disease.  Continue hemodialysis.  She underwent hemodialysis tod
ay.

2.  Right arm midline.  Catheter placed in a patient with chronic kidney disease removed on TPN and t
olerating that well.  Prior stroke.  Overall, prognosis is poor.

## 2018-08-04 NOTE — PRG
DATE OF SERVICE:  08/03/2018

 

SUBJECTIVE:  Ms. Simon Purdy today is doing well.  Dr. Gates is extubated her.  She is awake a
nd stable.

 

OBJECTIVE:

VITAL SIGNS:  Heart rate 96, respiratory rate 20, blood pressure 115/44.

LUNGS:  Rhonchi at base.

CARDIAC:  Regular rate and rhythm.

ABDOMEN:  Soft, nontender.  Wound VAC has been applied to the old G-tube site.

 

LABORATORY DATA:  This morning, her hemoglobin was 9.0 and this afternoon 9.4.  Basic metabolic profi
le stable with BUN of 55, creatinine 4.14, GFR 13, carbon dioxide 15, potassium 3.9.  Urine output vi
a her Bennett 24 hours 20 mL.  When I placed her hemodialysis catheter, her central venous pressures we
re tremendously high.  She has not been dialyzed since placement of the dialysis catheter.

 

ASSESSMENT AND PLAN:

1.  Recent stroke, severe debilitation and mobility.  Prognosis is poor.

2.  Dysphagia.

3.  Malfunctioning gastrostomy tube.

4.  Recent laparoscopy and lysis of adhesions.  Plan TPN administration with G-tube to gravity draina
ge.  G-tube to gravity drainage thus far as put out 350 mL.

5.  Renal failure, dialysis per Dr. Leahy.  She has a hemodialysis catheter in place.

## 2018-08-04 NOTE — PDOC.PN
- Subjective


Encounter Start Date: 08/04/18 (f/u hypertension)


Encounter Start Time: 08:43


Subjective: Pt intubated yesterday evening due to change in resp status.  

Lantus 


-: held last night.  meds held due to NG tube with a lot of output.


-: Plan for short dialysis today





- Objective


Resuscitation Status: 


 











Resuscitation Status           FULL:Full Resuscitation














Vital Signs & Weight: 


 Vital Signs (12 hours)











  Temp Pulse Resp BP Pulse Ox


 


 08/04/18 07:00  98.6 F    


 


 08/04/18 06:46   95   179/53 H 


 


 08/04/18 06:45   94  21 H   99


 


 08/04/18 06:00    22 H  


 


 08/04/18 04:00    23 H  


 


 08/04/18 02:11   92   


 


 08/04/18 02:00    22 H  


 


 08/04/18 00:32   90  17   100


 


 08/04/18 00:00  98.8 F   19   100


 


 08/03/18 22:00    23 H  


 


 08/03/18 21:21   96   


 


 08/03/18 21:05    20  








 Weight











Admit Weight                   194 lb 7.163 oz


 


Weight                         3.601 oz











 Most Recent Monitor Data











Heart Rate from ECG            97


 


NIBP                           165/47


 


NIBP BP-Mean                   93


 


Respiration from ECG           12


 


SpO2                           100














I&O: 


 











 08/03/18 08/04/18 08/05/18





 06:59 06:59 06:59


 


Intake Total 2559 3184 


 


Output Total 20 896 37


 


Balance 2539 2288 -37











Result Diagrams: 


 08/04/18 04:40





 08/04/18 04:40


Additional Labs: 


 Accuchecks











  08/04/18 08/04/18 08/03/18





  06:03 00:49 21:02


 


POC Glucose  227 H  202 H  153 H














  08/03/18





  15:50


 


POC Glucose  141 H











EKG Reviewed by me: Yes (tele - sinus 90's)





Phys Exam





- Physical Examination


Constitutional: NAD


intubated and sedated


Respiratory: no wheezing, no rales


scattered rhonchi


Cardiovascular: RRR, no significant murmur


Gastrointestinal: soft, positive bowel sounds


abdominal wall edema slightly improved


Unable to assess


Deviation from normal: unable to assess


Skin: no rash





Dx/Plan


(1) PEG tube malfunction


Code(s): K94.23 - GASTROSTOMY MALFUNCTION   Status: Acute   





(2) Dysphagia


Code(s): R13.10 - DYSPHAGIA, UNSPECIFIED   Status: Acute   


Qualifiers: 


   Dysphagia type: unspecified   Qualified Code(s): R13.10 - Dysphagia, 

unspecified   


Comment: s/p peg   





(3) Hemiplegia affecting left nondominant side


Code(s): G81.94 - HEMIPLEGIA, UNSPECIFIED AFFECTING LEFT NONDOMINANT SIDE   

Status: Acute   


Qualifiers: 


   Hemiplegia type: flaccid 





(4) Metabolic acidosis


Code(s): E87.2 - ACIDOSIS   Status: Acute   





(5) CKD (chronic kidney disease) stage 4, GFR 15-29 ml/min


Code(s): N18.4 - CHRONIC KIDNEY DISEASE, STAGE 4 (SEVERE)   Status: Chronic   





(6) Diabetes


Code(s): E11.9 - TYPE 2 DIABETES MELLITUS WITHOUT COMPLICATIONS   Status: 

Chronic   


Qualifiers: 


   Diabetes mellitus type: type 2   Diabetes mellitus long term insulin use: 

without long term use   Diabetes mellitus complication status: with kidney 

complications   Diabetes mellitus complication detail: with chronic kidney 

disease   Chronic kidney disease stage: stage 4 (severe)   Qualified Code(s): 

E11.22 - Type 2 diabetes mellitus with diabetic chronic kidney disease; N18.4 - 

Chronic kidney disease, stage 4 (severe); N18.4 - Chronic kidney disease, stage 

4 (severe); N18.4 - Chronic kidney disease, stage 4 (severe); N18.4 - Chronic 

kidney disease, stage 4 (severe)   





(7) Hyperlipidemia


Code(s): E78.5 - HYPERLIPIDEMIA, UNSPECIFIED   Status: Chronic   


Qualifiers: 


   Hyperlipidemia type: unspecified   Qualified Code(s): E78.5 - Hyperlipidemia

, unspecified   





(8) Hypertension


Code(s): I10 - ESSENTIAL (PRIMARY) HYPERTENSION   Status: Chronic   


Qualifiers: 


   Hypertension type: essential hypertension   Qualified Code(s): I10 - 

Essential (primary) hypertension   





(9) Hypokalemia


Code(s): E87.6 - HYPOKALEMIA   Status: Resolved   





(10) Thrombocytopenia


Code(s): D69.6 - THROMBOCYTOPENIA, UNSPECIFIED   Status: Acute   





(11) Acute respiratory failure


Code(s): J96.00 - ACUTE RESPIRATORY FAILURE, UNSP W HYPOXIA OR HYPERCAPNIA   

Status: Acute   





- Plan





* Pt POD#2 from surgery


* Appreciate Pulmonology consult - intubated/sedated


* 


* Appreciate ID consult - continue cefepime


* 


* Appreciate Nephrology plan for short dialysis today


* 


* NG tube with large output - will change meds to transdermal and IV - resume 

clonidine, change to metoprolol and prn hydralazine.


* 


* thrombocytopenia - stable - appreciate Hematology consult, which states anti-

platelet medications can be continued unless platelets less than 40.  Will 

continue to hold the plavix - there is no added benefit per Neurology and pt at 

higher risk of bleeding, and Cardiology does not think it is essential -pt can 

be on aspirin alone.  I'm concerned about the additional risk of bleeding as 

well in this complicated current condition.





* DM - blood sugars in the 200's on TPN - restart lantus - will start at 10 

units and adjust, continue coverage with SSI.


* 


* dvt prophy - scd's due to thrombocytopenia


* gi prophy - add IV protonix


* code status full


* 


* reviewed plan of care with RN.  





* pt remains at high risk in current condition with overall poor prognosis.

## 2018-08-04 NOTE — PRG
DATE OF SERVICE:  08/04/2018

 

SUBJECTIVE:  Ms. Purdy's vital signs were stable overnight.

 

OBJECTIVE:

VITAL SIGNS:  She is still hypertensive with a blood pressure of 171/54, heart 
rate 87.  Catapres patch was started.

LUNGS:  Clear anteriorly.

HEART:  Regular rhythm.

ABDOMEN:  Soft.

EXTREMITIES:  Without asymmetry.

 

Hopefully, dialysis will help to facilitate blood pressure control.

 

LABORATORY DATA:  White count 10.7, hemoglobin was falling from 9.4-7.9, 
platelets 82,000.

 

Intake and output is positive 2288.

 

BUN is 58, creatinine is 4.35.

 

Bicarbonate was 12.

 

PH this morning 7.38, CO2 23, pO2 of 137.

 

IMPRESSION:

1.  Metabolic acidosis in part secondary to hyperchloremia and in part 
secondary to renal failure, dialysis should help this.

2.  Respiratory failure secondary to acidemia combined with massive abdominal 
distention.

3.  Clinical ileus with massive abdominal distention.

4.  Recent stroke with severe deconditioning.

5.  Swallowing dysfunction after stroke.

6.  Laparoscopy and lysis of adhesions.

 

PLAN:  Continue mechanical ventilation with dialysis.  Hopefully, acid base 
status will improve until her abdominal distention improved.  She is certainly 
not weanable.

 

Critical care time is 35 minutes.



MTDD

## 2018-08-04 NOTE — RAD
CHEST 1 VIEW:

 

Date:  08/04/18 

 

HISTORY:  

Dyspnea. Ventilated patient. 

 

COMPARISON:  

Radiograph prior day. 

 

FINDINGS:

Dialysis catheter and left IJ central venous catheter is similar. Endotracheal tube tip at level of c
lavicles. 

 

Layering effusions. Heart size enlarged. Pulmonary arteries are dilated. 

 

There is right lower lobe collapse. 

 

IMPRESSION: 

No significant change in radiographic appearance of chest. No significant improvement. 

 

 

POS: [facility]

## 2018-08-05 LAB
ALBUMIN SERPL BCG-MCNC: 2.5 G/DL (ref 3.4–4.8)
ALP SERPL-CCNC: 127 U/L (ref 40–150)
ALT SERPL W P-5'-P-CCNC: 93 U/L (ref 8–55)
ANALYZER IN CARDIO: (no result)
ANION GAP SERPL CALC-SCNC: 13 MMOL/L (ref 10–20)
AST SERPL-CCNC: 60 U/L (ref 5–34)
BASE EXCESS STD BLDA CALC-SCNC: -8 MEQ/L
BASOPHILS # BLD AUTO: 0 THOU/UL (ref 0–0.2)
BASOPHILS NFR BLD AUTO: 0.3 % (ref 0–1)
BILIRUB SERPL-MCNC: 0.5 MG/DL (ref 0.2–1.2)
BUN SERPL-MCNC: 54 MG/DL (ref 9.8–20.1)
CA-I BLDA-SCNC: 1.1 MMOL/L (ref 1.12–1.3)
CALCIUM SERPL-MCNC: 8.3 MG/DL (ref 7.8–10.44)
CHLORIDE SERPL-SCNC: 108 MMOL/L (ref 98–107)
CO2 SERPL-SCNC: 18 MMOL/L (ref 23–31)
CREAT CL PREDICTED SERPL C-G-VRATE: 22 ML/MIN (ref 70–130)
EOSINOPHIL # BLD AUTO: 0.2 THOU/UL (ref 0–0.7)
EOSINOPHIL NFR BLD AUTO: 2.4 % (ref 0–10)
GLOBULIN SER CALC-MCNC: 2.6 G/DL (ref 2.4–3.5)
GLUCOSE SERPL-MCNC: 242 MG/DL (ref 83–110)
HCO3 BLDA-SCNC: 16.4 MEQ/L (ref 22–28)
HCT VFR BLDA CALC: 16 % (ref 36–47)
HGB BLD-MCNC: 7 G/DL (ref 12–16)
HGB BLD-MCNC: 9.4 G/DL (ref 12–16)
HGB BLDA-MCNC: 5.6 G/DL (ref 12–16)
LYMPHOCYTES # BLD: 1.3 THOU/UL (ref 1.2–3.4)
LYMPHOCYTES NFR BLD AUTO: 17.6 % (ref 21–51)
MAGNESIUM SERPL-MCNC: 1.7 MG/DL (ref 1.6–2.6)
MCH RBC QN AUTO: 31.3 PG (ref 27–31)
MCH RBC QN AUTO: 31.4 PG (ref 27–31)
MCV RBC AUTO: 87.5 FL (ref 78–98)
MCV RBC AUTO: 88.7 FL (ref 78–98)
MDIFF COMPLETE?: YES
MONOCYTES # BLD AUTO: 0.6 THOU/UL (ref 0.11–0.59)
MONOCYTES NFR BLD AUTO: 7.8 % (ref 0–10)
NEUTROPHILS # BLD AUTO: 5.3 THOU/UL (ref 1.4–6.5)
NEUTROPHILS NFR BLD AUTO: 71.9 % (ref 42–75)
O2 A-A PPRESDIFF RESPIRATORY: 48.73 MM[HG] (ref 0–20)
PCO2 BLDA: 28.7 MMHG (ref 35–45)
PH BLDA: 7.38 [PH] (ref 7.35–7.45)
PLATELET # BLD AUTO: 70 THOU/UL (ref 130–400)
PLATELET # BLD AUTO: 91 THOU/UL (ref 130–400)
PLATELET BLD QL SMEAR: (no result)
PO2 BLDA: 129.3 MMHG (ref 70–?)
POTASSIUM BLD-SCNC: 3 MMOL/L (ref 3.7–5.3)
POTASSIUM SERPL-SCNC: 3.2 MMOL/L (ref 3.5–5.1)
RBC # BLD AUTO: 2.23 MILL/UL (ref 4.2–5.4)
RBC # BLD AUTO: 3.02 MILL/UL (ref 4.2–5.4)
SODIUM SERPL-SCNC: 136 MMOL/L (ref 136–145)
SPECIMEN DRAWN FROM PATIENT: (no result)
WBC # BLD AUTO: 7.4 THOU/UL (ref 4.8–10.8)
WBC # BLD AUTO: 8.3 THOU/UL (ref 4.8–10.8)

## 2018-08-05 PROCEDURE — 30233N1 TRANSFUSION OF NONAUTOLOGOUS RED BLOOD CELLS INTO PERIPHERAL VEIN, PERCUTANEOUS APPROACH: ICD-10-PCS | Performed by: INTERNAL MEDICINE

## 2018-08-05 PROCEDURE — 30233R1 TRANSFUSION OF NONAUTOLOGOUS PLATELETS INTO PERIPHERAL VEIN, PERCUTANEOUS APPROACH: ICD-10-PCS | Performed by: INTERNAL MEDICINE

## 2018-08-05 RX ADMIN — HYDROCORTISONE SCH: 10 CREAM TOPICAL at 09:38

## 2018-08-05 RX ADMIN — ALBUTEROL SULFATE SCH MG: 2.5 SOLUTION RESPIRATORY (INHALATION) at 06:46

## 2018-08-05 RX ADMIN — ALBUTEROL SULFATE SCH MG: 2.5 SOLUTION RESPIRATORY (INHALATION) at 00:34

## 2018-08-05 RX ADMIN — ALBUTEROL SULFATE SCH MG: 2.5 SOLUTION RESPIRATORY (INHALATION) at 13:44

## 2018-08-05 RX ADMIN — Medication SCH ML: at 08:39

## 2018-08-05 RX ADMIN — INSULIN GLARGINE SCH MLS: 100 INJECTION, SOLUTION SUBCUTANEOUS at 08:39

## 2018-08-05 RX ADMIN — HYDROCORTISONE SCH: 10 CREAM TOPICAL at 20:37

## 2018-08-05 RX ADMIN — DEXTROSE MONOHYDRATE SCH MLS: 70 INJECTION, SOLUTION INTRAVENOUS at 22:04

## 2018-08-05 RX ADMIN — INSULIN LISPRO PRN UNIT: 100 INJECTION, SOLUTION INTRAVENOUS; SUBCUTANEOUS at 05:56

## 2018-08-05 RX ADMIN — ALBUTEROL SULFATE SCH MG: 2.5 SOLUTION RESPIRATORY (INHALATION) at 18:54

## 2018-08-05 RX ADMIN — INSULIN LISPRO PRN UNIT: 100 INJECTION, SOLUTION INTRAVENOUS; SUBCUTANEOUS at 00:18

## 2018-08-05 RX ADMIN — ERYTHROPOIETIN SCH UNITS: 20000 INJECTION, SOLUTION INTRAVENOUS; SUBCUTANEOUS at 16:42

## 2018-08-05 RX ADMIN — INSULIN GLARGINE SCH MLS: 100 INJECTION, SOLUTION SUBCUTANEOUS at 20:37

## 2018-08-05 RX ADMIN — INSULIN LISPRO PRN UNIT: 100 INJECTION, SOLUTION INTRAVENOUS; SUBCUTANEOUS at 18:21

## 2018-08-05 RX ADMIN — Medication SCH ML: at 20:36

## 2018-08-05 RX ADMIN — INSULIN LISPRO PRN UNIT: 100 INJECTION, SOLUTION INTRAVENOUS; SUBCUTANEOUS at 12:51

## 2018-08-05 NOTE — PRG
DATE OF SERVICE:  08/05/2018

 

SUBJECTIVE:  Ms. Purdy is intubated in the ICU ____.  She has been stable.  I was called this morn
ing that she has bleeding out of her old PEG tube site with a wound VAC was in place.  She put out 40
0 mL.  The wound VAC was removed.  Wound inspected.  The PDS sutures present in the fascia were intac
t, but there were some separation in one area approximately 2.5 cm2.  Coming from this wound with andrés
p inspiration was a thin serous bloody colored fluid.  This appeared more bloody ascites.  There was 
no evidence of GI bleeding from her PEG tube site or her NG tube.  Her hemoglobin is 7 this morning. 
 At the bedside, several 3-0 Vicryl and PDS sutures were placed.  Wound VAC was applied.

 

OBJECTIVE:

LUNGS:  Clear to auscultation.

CARDIAC:  Regular rate and rhythm without murmur or gallop.

ABDOMEN:  Soft.

EXTREMITIES:  Edematous.

 

LABORATORY DATA:  Hemoglobin 7, white count 8.  Sodium 136, potassium 3.2, BUN 54, creatinine 3.79, G
FR 14.  Accu-Cheks 251-288.

 

ASSESSMENT AND PLAN:

1.  End-stage renal disease superimposed on chronic kidney disease, undergoing dialysis at this time,
 Dr. Leahy has seen her.  Continue dialysis.

2.  Anemia transfusions.  No evidence of gastrointestinal bleeding.

3.  Ascites leaking from old PEG tube site, I have placed sutures at the bedside and we were applied 
a wound VAC.

4.  Stroke.

5.  Respiratory failure.

## 2018-08-05 NOTE — RAD
CHEST 1 VIEW:

 

Date:  089/05/18 

 

HISTORY:  

Ventilated patient. 

 

COMPARISON:  

Chest radiograph from prior day. 

 

FINDINGS:

Dialysis catheter and left IJ central venous catheter similar. Exam limited due to rightward patient 
rotation. Layering effusions and right lower lobe atelectasis. There is mild edema. 

 

Enteric tube tip not well seen. 

 

IMPRESSION: 

Rotated and limited exam. No significant change. 

 

 

POS: [facility]

## 2018-08-05 NOTE — PRG
DATE OF SERVICE:  08/05/2018

 

SUBJECTIVE:  Ms. Purdy had bleeding into her wound VAC today.  Dr. Petersen quickly addressed this a
t the bedside.  Some of it appeared to be blood, and a lot of it appeared to be bloody ascites.

 

OBJECTIVE:

VITAL SIGNS:  She is afebrile, respiratory rate is 14, blood pressure 156/47, heart rate 79.  LUNGS: 
 Clear.

HEART:  Regular rhythm.

ABDOMEN:  Distended.

EXTREMITIES:  Without asymmetry.

 

LABORATORY DATA:  White count 8.3, hemoglobin 7.0.  She was to be transfused 2 units of packed cells.


 

I gave her DDAVP for uremic platelet dysfunction and also transfused 1 unit of platelets.

 

Sodium 136, potassium 3.2, chloride 108, bicarbonate 18, BUN 54, creatinine 3.79.

 

Intake and output is positive 931.  Bennett output was reported as 1042 mL.

 

IMPRESSION:

1.  Respiratory failure secondary to retained secretions, abdominal distention and metabolic acidosis
, requiring reintubation.

2.  Wound bleeding.

3.  Diabetes.

4.  Acute on chronic kidney disease.

5.  Mild elevation of liver enzymes.

 

PLAN:  Continue supportive care.

## 2018-08-05 NOTE — PDOC.PN
- Subjective


Encounter Start Date: 08/05/18 (f/u DM)


Encounter Start Time: 07:05


Subjective: Pt remains intubated and sedated.  no overnight events.  BP's remain


-: elevated and blood sugars in the mid-200's





- Objective


Resuscitation Status: 


 











Resuscitation Status           FULL:Full Resuscitation














Vital Signs & Weight: 


 Vital Signs (12 hours)











  Temp Pulse Resp BP Pulse Ox


 


 08/05/18 06:48   82   170/50 H 


 


 08/05/18 06:46   81  14   99


 


 08/05/18 06:00    16  


 


 08/05/18 04:00    16  


 


 08/05/18 03:34   84   175/57 H 


 


 08/05/18 02:28   91   


 


 08/05/18 02:00    17  


 


 08/05/18 00:34   91  16   100


 


 08/05/18 00:00  98 F   21 H  


 


 08/04/18 22:10   88   172/59 H 


 


 08/04/18 22:00    18  


 


 08/04/18 20:00  98.6 F  85  16   100








 Weight











Admit Weight                   194 lb 7.163 oz


 


Weight                         227 lb 1.218 oz











 Most Recent Monitor Data











Heart Rate from ECG            78


 


NIBP                           170/50


 


NIBP BP-Mean                   119


 


Respiration from ECG           15


 


SpO2                           100














I&O: 


 











 08/04/18 08/05/18 08/06/18





 06:59 06:59 06:59


 


Intake Total 3184 2258 


 


Output Total 896 1327 


 


Balance 2288 931 











Result Diagrams: 


 08/05/18 05:45





 08/05/18 05:45


Additional Labs: 


 Accuchecks











  08/05/18 08/05/18 08/04/18





  05:48 00:17 16:52


 


POC Glucose  240 H  252 H  251 H














  08/04/18





  11:02


 


POC Glucose  274 H











EKG Reviewed by me: Yes (tele - sinus 80's, episode of narrow complex tachy, 

resolved)





Phys Exam





- Physical Examination


Constitutional: NAD


intubated, sedated


Respiratory: no wheezing, no rales, no rhonchi


Cardiovascular: RRR, no significant murmur


Gastrointestinal: soft, positive bowel sounds


unchanged abd wall edema


generalized edema - unchanged


unable to assess - no spontaneous movement


Deviation from normal: unable to assess


Skin: no rash





Dx/Plan


(1) Acute respiratory failure


Code(s): J96.00 - ACUTE RESPIRATORY FAILURE, UNSP W HYPOXIA OR HYPERCAPNIA   

Status: Acute   





(2) PEG tube malfunction


Code(s): K94.23 - GASTROSTOMY MALFUNCTION   Status: Acute   





(3) Dysphagia


Code(s): R13.10 - DYSPHAGIA, UNSPECIFIED   Status: Acute   


Qualifiers: 


   Dysphagia type: unspecified   Qualified Code(s): R13.10 - Dysphagia, 

unspecified   


Comment: s/p peg   





(4) Hemiplegia affecting left nondominant side


Code(s): G81.94 - HEMIPLEGIA, UNSPECIFIED AFFECTING LEFT NONDOMINANT SIDE   

Status: Acute   


Qualifiers: 


   Hemiplegia type: flaccid 





(5) Metabolic acidosis


Code(s): E87.2 - ACIDOSIS   Status: Acute   





(6) Diabetes


Code(s): E11.9 - TYPE 2 DIABETES MELLITUS WITHOUT COMPLICATIONS   Status: 

Chronic   


Qualifiers: 


   Diabetes mellitus type: type 2   Diabetes mellitus long term insulin use: 

without long term use   Diabetes mellitus complication status: with kidney 

complications   Diabetes mellitus complication detail: with chronic kidney 

disease   Chronic kidney disease stage: stage 4 (severe)   Qualified Code(s): 

E11.22 - Type 2 diabetes mellitus with diabetic chronic kidney disease; N18.4 - 

Chronic kidney disease, stage 4 (severe); N18.4 - Chronic kidney disease, stage 

4 (severe); N18.4 - Chronic kidney disease, stage 4 (severe); N18.4 - Chronic 

kidney disease, stage 4 (severe)   





(7) Hyperlipidemia


Code(s): E78.5 - HYPERLIPIDEMIA, UNSPECIFIED   Status: Chronic   


Qualifiers: 


   Hyperlipidemia type: unspecified   Qualified Code(s): E78.5 - Hyperlipidemia

, unspecified   





(8) Hypertension


Code(s): I10 - ESSENTIAL (PRIMARY) HYPERTENSION   Status: Chronic   


Qualifiers: 


   Hypertension type: essential hypertension   Qualified Code(s): I10 - 

Essential (primary) hypertension   





(9) Hypokalemia


Code(s): E87.6 - HYPOKALEMIA   Status: Resolved   





(10) Thrombocytopenia


Code(s): D69.6 - THROMBOCYTOPENIA, UNSPECIFIED   Status: Acute   





(11) CKD (chronic kidney disease) stage 5, GFR less than 15 ml/min


Code(s): N18.5 - CHRONIC KIDNEY DISEASE, STAGE 5   Status: Acute   





- Plan





* Pt POD#3 from surgery


* Appreciate Pulmonology consult - intubated/sedated


* 


* Appreciate ID consult - continue cefepime for abdominal wound - now on 

antibiotics day 12 (cefepime started 8/3, Zosyn 7/25 - 8/2)


* 


* Appreciate Nephrology - dialysis plan daily for now - started yesterday.





* BP remains uncontrolled - on IV metoprolol, and clonidine patch.  Will need 

to determine if NG tube can be used vs adding scheduled IV medication.  

Anticipate dialysis will help as well.


* 


* thrombocytopenia - stable - no change to the plan of aspirin only.  Pt seen 

by Hematology last week, can continue plavix unless platelets are less than 

40k.  However, no clear indication to do this after discussion with Dr. Ludwig and Dr. BERRY Moreno last week.  Pt is at risk of bleeding, has multiple 

significant issues at this time and overall poor prognosis.  Medications 

reviewed and last dose of aspirin was 2 Aug, resume when it is considered safer.


* 


* DM - blood sugars in the mid-200's.  Lantus started yesterday morning, 

changed to BID last evening without change.  will increase to 20 units BID.  Pt 

with abdominal wall edema, uncertain of how much absorption is occurring - if 

remains uncontrolled, may need to consider an insulin gtt.


* 


* dvt prophy - scd's due to thrombocytopenia


* gi prophy - IV protonix


* code status full


* 


* reviewed plan of care with RN.  





* pt remains at high risk in current condition with overall poor prognosis.


* 


17:31 - updated by RN today for large amount of bleeding from wound vac and 

transfusion of 2 units prbc.  NG tube available for medications.  Given that bp'

s remain elevated, will resume her home medication of amlodipine for improved 

control.  Pt is on metoprolol IV and clonidine patch currently.

## 2018-08-05 NOTE — PRG
DATE OF SERVICE:  08/05/2018

 

SUBJECTIVE:  Ms. Purdy is a 73-year-old black female with known history of chronic renal failure, 
has been initiated on dialysis due to volume overload.  Early this morning, her gastrostomy site has 
been bleeding.  It is being re-ligated by Dr. Petersen.  She was also noted to be on anemic side.  For 
this reason, we will be giving a total of 2 units of packed RBC.  One unit will be given right now.  
Platelet count was noted to be on the low side.  The plan is to give her platelet as well as DDAVP.  
Case discussed with Dr. Petersen and Dr. Dewitt.

 

PHYSICAL EXAMINATION:

VITAL SIGNS:  Blood pressure is noted at 166/48, heart rate 75, respiratory rate is 12, O2 sat 100%.

GENERAL:  Patient is sedated, intubated on ventilator support.

SKIN:  Adequate turgor.

HEENT:  Pale conjunctivae, anicteric sclerae.

NECK:  No neck mass, no carotid bruits, no JVD.

LUNGS:  Decreased breath sounds.

HEART:  Normal sinus rhythm.

ABDOMEN:  Globular, soft.  Positive for surgical wound.

EXTREMITIES:  No edema.

 

MEDICATIONS:  Of 08/05/2018 was reviewed.

 

LABORATORY DATA:  Of 08/05/2018, white count 8.3, hemoglobin 7, sodium 136, potassium 3.2, chloride 1
08, carbon dioxide 18, BUN 54, creatinine 3.79, glucose 242, phosphorus 4.3, magnesium 1.7, albumin 2
.5.

 

ASSESSMENT AND PLAN:

1.  Chronic renal failure/acute kidney injury - hemodialysis has been initiated due to volume overloa
d.  Fluid removal only as tolerated with dialysis.  I have planned to do a 2-hour hemodialysis with t
his patient without any heparin.

2.  Bleeding from PEG sites/surgical area - being re-ligated by Dr. Petersen.

3.  Anemia, p.r.n. blood transfusion.  Continue weekly Epogen.  We will give 2 units of packed RBC.  
DDAVP and platelets will also be given.

 

Overall, prognosis remains guarded with this patient.

## 2018-08-06 LAB
ALBUMIN SERPL BCG-MCNC: 2.3 G/DL (ref 3.4–4.8)
ALP SERPL-CCNC: 116 U/L (ref 40–150)
ALT SERPL W P-5'-P-CCNC: 60 U/L (ref 8–55)
ANALYZER IN CARDIO: (no result)
ANION GAP SERPL CALC-SCNC: 13 MMOL/L (ref 10–20)
AST SERPL-CCNC: 37 U/L (ref 5–34)
BASE EXCESS STD BLDA CALC-SCNC: -3.6 MEQ/L
BILIRUB SERPL-MCNC: 0.6 MG/DL (ref 0.2–1.2)
BUN SERPL-MCNC: 41 MG/DL (ref 9.8–20.1)
CA-I BLDA-SCNC: 1.1 MMOL/L (ref 1.12–1.3)
CALCIUM SERPL-MCNC: 8.1 MG/DL (ref 7.8–10.44)
CHLORIDE SERPL-SCNC: 104 MMOL/L (ref 98–107)
CO2 SERPL-SCNC: 21 MMOL/L (ref 23–31)
CREAT CL PREDICTED SERPL C-G-VRATE: 29 ML/MIN (ref 70–130)
GLOBULIN SER CALC-MCNC: 2.5 G/DL (ref 2.4–3.5)
GLUCOSE SERPL-MCNC: 233 MG/DL (ref 83–110)
HCO3 BLDA-SCNC: 21.2 MEQ/L (ref 22–28)
HCT VFR BLDA CALC: 28 % (ref 36–47)
HGB BLD-MCNC: 8.7 G/DL (ref 12–16)
HGB BLDA-MCNC: 9.4 G/DL (ref 12–16)
MAGNESIUM SERPL-MCNC: 1.7 MG/DL (ref 1.6–2.6)
MCH RBC QN AUTO: 31.3 PG (ref 27–31)
MCV RBC AUTO: 87.4 FL (ref 78–98)
MDIFF COMPLETE?: YES
O2 A-A PPRESDIFF RESPIRATORY: 39.6 MM[HG] (ref 0–20)
PCO2 BLDA: 37 MMHG (ref 35–45)
PH BLDA: 7.38 [PH] (ref 7.35–7.45)
PLATELET # BLD AUTO: 75 THOU/UL (ref 130–400)
PLATELET BLD QL SMEAR: (no result)
PO2 BLDA: 92.4 MMHG (ref 70–?)
POTASSIUM BLD-SCNC: 2.9 MMOL/L (ref 3.7–5.3)
POTASSIUM SERPL-SCNC: 2.8 MMOL/L (ref 3.5–5.1)
RBC # BLD AUTO: 2.79 MILL/UL (ref 4.2–5.4)
SODIUM SERPL-SCNC: 135 MMOL/L (ref 136–145)
SPECIMEN DRAWN FROM PATIENT: (no result)
WBC # BLD AUTO: 6.9 THOU/UL (ref 4.8–10.8)

## 2018-08-06 RX ADMIN — ALBUTEROL SULFATE SCH MG: 2.5 SOLUTION RESPIRATORY (INHALATION) at 18:29

## 2018-08-06 RX ADMIN — INSULIN LISPRO PRN UNIT: 100 INJECTION, SOLUTION INTRAVENOUS; SUBCUTANEOUS at 17:28

## 2018-08-06 RX ADMIN — Medication SCH ML: at 08:10

## 2018-08-06 RX ADMIN — Medication SCH ML: at 21:21

## 2018-08-06 RX ADMIN — INSULIN LISPRO PRN UNIT: 100 INJECTION, SOLUTION INTRAVENOUS; SUBCUTANEOUS at 06:22

## 2018-08-06 RX ADMIN — INSULIN LISPRO PRN UNIT: 100 INJECTION, SOLUTION INTRAVENOUS; SUBCUTANEOUS at 11:34

## 2018-08-06 RX ADMIN — INSULIN LISPRO PRN UNIT: 100 INJECTION, SOLUTION INTRAVENOUS; SUBCUTANEOUS at 00:26

## 2018-08-06 RX ADMIN — DEXTROSE MONOHYDRATE SCH MLS: 70 INJECTION, SOLUTION INTRAVENOUS at 22:30

## 2018-08-06 RX ADMIN — HYDROCORTISONE SCH: 10 CREAM TOPICAL at 10:56

## 2018-08-06 RX ADMIN — ALBUTEROL SULFATE SCH MG: 2.5 SOLUTION RESPIRATORY (INHALATION) at 06:23

## 2018-08-06 RX ADMIN — INSULIN GLARGINE SCH MLS: 100 INJECTION, SOLUTION SUBCUTANEOUS at 21:07

## 2018-08-06 RX ADMIN — ALBUTEROL SULFATE SCH MG: 2.5 SOLUTION RESPIRATORY (INHALATION) at 00:39

## 2018-08-06 RX ADMIN — INSULIN GLARGINE SCH MLS: 100 INJECTION, SOLUTION SUBCUTANEOUS at 08:10

## 2018-08-06 RX ADMIN — ALBUTEROL SULFATE SCH MG: 2.5 SOLUTION RESPIRATORY (INHALATION) at 23:35

## 2018-08-06 RX ADMIN — ALBUTEROL SULFATE SCH MG: 2.5 SOLUTION RESPIRATORY (INHALATION) at 13:13

## 2018-08-06 NOTE — RAD
PORTABLE AP CHEST RADIOGRAPH:

 

Date: 8-6-18 

 

History: On ventilator. Follow up evaluation. 

 

Comparison: 8-5-18

 

FINDINGS: 

Nasogastric tube, left internal jugular vein central venous catheter, and right internal jugular vein
 hemodialysis catheter remain in place and unchanged in position. Tip of the central venous catheter 
as well as dialysis catheter overlie the right atrium. Cardiac silhouette is mildly enlarged. Pulmona
ry vasculature does appear moderately increased. There is subsegmental atelectasis in the right mid l
kathryn zone which has improved. Probable tiny left pleural effusion atelectasis. No other interval olson
e.

 

IMPRESSION: 

1. Cardiomegaly with mild pulmonary vascular congestion. 

2. Lines and tubes in place as described above. 

3. Small left pleural effusion. 

4. Subsegmental atelectasis at the right lung base. 

 

POS: [facility]

## 2018-08-06 NOTE — PRG
DATE OF SERVICE:  08/06/2018

 

SUBJECTIVE:  Ms. Purdy is doing well today.  She is on the ventilator.  Her urine output is 155.  
NG tube is clamped.

 

OBJECTIVE

LUNGS:  Clear to auscultation.

CARDIAC:  Regular rate and rhythm without murmur or gallop.

ABDOMEN:  Soft, bowel sounds present.

 

LABORATORY DATA:  White count 6, hemoglobin 8.7.  Basic metabolic profile unremarkable except for pot
assium of 2.8, which is being replaced.  BUN 41, creatinine 2.89.

 

ASSESSMENT:  Acute renal failure, end-stage renal disease, chronic kidney disease.

 

PLAN:

1.  Continue dialysis.

2.  Hemodialysis catheter.

3.  We will plan to clamp her G-tube, initiate tube feedings per PEG tube.  Medications per PEG tube.


4.  Respiratory failure, discussion with family per Pulmonary, she may need a tracheostomy.

## 2018-08-06 NOTE — PDOC.PN
- Subjective


Encounter Start Date: 08/06/18


Encounter Start Time: 14:00


-: old records requested/rev





Pt seen and examined, chart reviewed in its entirety, this is my first visit 

with this patient for this encounter





pt remains intubated on SIMV.  arousable, follows commands.





No F/C, no N/V/d/C, c/o back hurting only.





All systems reviewed and neg for all systems except as above





- Objective


Resuscitation Status: 


 











Resuscitation Status           FULL:Full Resuscitation














MAR Reviewed: Yes


Vital Signs & Weight: 


 Vital Signs (12 hours)











  Temp Pulse Resp BP Pulse Ox


 


 08/06/18 13:13   79  20   99


 


 08/06/18 12:00    12  


 


 08/06/18 11:17   78   136/47 L 


 


 08/06/18 10:56   78   136/47 L 


 


 08/06/18 10:13   78   136/47 L 


 


 08/06/18 10:00    12  


 


 08/06/18 08:00  98.5 F  78  14   98


 


 08/06/18 07:00  98.5 F    


 


 08/06/18 06:27   73   154/44 H 


 


 08/06/18 06:00    18  


 


 08/06/18 04:00  98.7 F   19  


 


 08/06/18 02:43   75   


 


 08/06/18 02:00    18  








 Weight











Admit Weight                   194 lb 7.163 oz


 


Weight                         222 lb 14.197 oz











 Most Recent Monitor Data











Heart Rate from ECG            79


 


NIBP                           164/53


 


NIBP BP-Mean                   107


 


Respiration from ECG           11


 


SpO2                           99














I&O: 


 











 08/05/18 08/06/18 08/07/18





 06:59 06:59 06:59


 


Intake Total 2258 2135 


 


Output Total 1327 2056 95


 


Balance 931 79 -95











Result Diagrams: 


 08/06/18 04:25





 08/06/18 04:25


Additional Labs: 


 Accuchecks











  08/06/18 08/06/18 08/06/18





  11:31 06:22 00:24


 


POC Glucose  181 H  237 H  244 H














  08/05/18





  18:20


 


POC Glucose  260 H











Radiology Reviewed by me: Yes


EKG Reviewed by me: Yes





Phys Exam





- Physical Examination


Constitutional: NAD


HEENT: PERRLA, moist MMs, sclera anicteric, oral pharynx no lesions


orally intubated


Neck: no nodes, no JVD, supple, full ROM


Respiratory: no wheezing, no rales, no rhonchi, clear to auscultation bilateral


Cardiovascular: RRR, no significant murmur, no rub


Gastrointestinal: positive bowel sounds


tense, distended, PEG C/D/I


Musculoskeletal: edema present


Neurological: moves all 4 limbs


Lymphatic: no nodes


Psychiatric: normal affect


Skin: no rash, normal turgor, cap refill <2 seconds





Dx/Plan


(1) Acute respiratory failure


Code(s): J96.00 - ACUTE RESPIRATORY FAILURE, UNSP W HYPOXIA OR HYPERCAPNIA   

Status: Acute   


Qualifiers: 


   Respiratory failure complication: hypoxia   Qualified Code(s): J96.01 - 

Acute respiratory failure with hypoxia   





(2) CKD (chronic kidney disease) stage 5, GFR less than 15 ml/min


Code(s): N18.5 - CHRONIC KIDNEY DISEASE, STAGE 5   Status: Chronic   





(3) PEG tube malfunction


Code(s): K94.23 - GASTROSTOMY MALFUNCTION   Status: Acute   





(4) Thrombocytopenia


Code(s): D69.6 - THROMBOCYTOPENIA, UNSPECIFIED   Status: Acute   





(5) Hypokalemia


Code(s): E87.6 - HYPOKALEMIA   Status: Resolved   





(6) NORA (acute kidney injury)


Code(s): N17.9 - ACUTE KIDNEY FAILURE, UNSPECIFIED   Status: Chronic   Comment: 

stable   





(7) Acute encephalopathy


Code(s): G93.40 - ENCEPHALOPATHY, UNSPECIFIED   Status: Resolved   Comment: sec 

to massive cva   





(8) Dysphagia


Code(s): R13.10 - DYSPHAGIA, UNSPECIFIED   Status: Acute   


Qualifiers: 


   Dysphagia type: unspecified   Qualified Code(s): R13.10 - Dysphagia, 

unspecified   


Comment: s/p peg   





(9) FTT (failure to thrive) in adult


Status: Chronic   





(10) Hemiplegia affecting left nondominant side


Code(s): G81.94 - HEMIPLEGIA, UNSPECIFIED AFFECTING LEFT NONDOMINANT SIDE   

Status: Chronic   


Qualifiers: 


   Hemiplegia type: flaccid 





(11) Metabolic acidosis


Code(s): E87.2 - ACIDOSIS   Status: Resolved   





(12) Diabetes


Code(s): E11.9 - TYPE 2 DIABETES MELLITUS WITHOUT COMPLICATIONS   Status: 

Chronic   


Qualifiers: 


   Diabetes mellitus type: type 2   Diabetes mellitus long term insulin use: 

without long term use   Diabetes mellitus complication status: with kidney 

complications   Diabetes mellitus complication detail: with chronic kidney 

disease   Chronic kidney disease stage: stage 4 (severe)   Qualified Code(s): 

E11.22 - Type 2 diabetes mellitus with diabetic chronic kidney disease; N18.4 - 

Chronic kidney disease, stage 4 (severe); N18.4 - Chronic kidney disease, stage 

4 (severe); N18.4 - Chronic kidney disease, stage 4 (severe); N18.4 - Chronic 

kidney disease, stage 4 (severe)   





(13) Hyperlipidemia


Code(s): E78.5 - HYPERLIPIDEMIA, UNSPECIFIED   Status: Chronic   


Qualifiers: 


   Hyperlipidemia type: unspecified   Qualified Code(s): E78.5 - Hyperlipidemia

, unspecified   





(14) Hypertension


Code(s): I10 - ESSENTIAL (PRIMARY) HYPERTENSION   Status: Chronic   


Qualifiers: 


   Hypertension type: essential hypertension   Qualified Code(s): I10 - 

Essential (primary) hypertension   





(15) Hypertensive urgency


Code(s): I16.0 - HYPERTENSIVE URGENCY   Status: Resolved   





- Plan


cont current plan of care, lieberman catheter, PT/OT, , respiratory 

therapy





* .

## 2018-08-06 NOTE — PRG
DATE OF SERVICE:  08/06/2018

 

Thirty-five minutes critical care time.

 

SUBJECTIVE:  Ms. Purdy failed extubation on Friday and had to be reintubated later that night.  Gilberto mejia has remained on mechanical ventilation over the weekend.  Yesterday, she had some bleeding into her
 wound VAC, which was addressed by Surgery.  She is currently on TPN.  She is also receiving intermit
tent dialysis.

 

PHYSICAL EXAMINATION:

VITAL SIGNS:  Temperature 98.7, pulse 78, blood pressure 154/44, O2 sat 98%, 24-hour intake 2135 and 
output 2700 that was through dialysis.

HEENT:  Unremarkable except for the endotracheal tube in place.

NECK:  No JVD.

LUNGS:  Fairly clear anteriorly.

CARDIOVASCULAR:  S1 and S2, regular, without murmur.

ABDOMEN:  Soft.  The midline wound VAC is noted.  The PEG tube is noted.

EXTREMITIES:  Edematous throughout.

 

IMAGING:  Chest x-ray shows normal heart size.  She has bilateral small pleural effusions.

 

LABORATORY DATA:  Sodium 135, potassium 3.8, chloride 104, CO2 of 21, BUN 41, creatinine 2.8, glucose
 233, AST 37, ALT 60.  White blood cell count 6.9, hemoglobin 8.7, hematocrit 24.4, platelet count 75
, pH 7.38, pCO2 of 37, pO2 of 92 on SIMV rate 14, tidal volume 500, PEEP 5, pressure 10, FiO2 25%.

 

ASSESSMENT:

1.  Acute respiratory failure, requiring mechanical ventilation.

2.  Status post massive stroke with severe debilitation.

3.  Acute on chronic renal failure.

4.  Percutaneous endoscopic gastrostomy tube.

5.  Hypokalemia.

 

PLAN:

1.  The patient's neuromuscular weakness is her biggest deterrent to weaning and extubation at this [facility].  Based on what I have seen over the last month, I think this patient likely needs a tracheostomy
 placed, but I need to gauge how her family feels about this.

2.  Continue dialysis.

3.  Slowly wean SIMV rate.

## 2018-08-07 LAB
ALBUMIN SERPL BCG-MCNC: 2.5 G/DL (ref 3.4–4.8)
ALP SERPL-CCNC: 135 U/L (ref 40–150)
ALT SERPL W P-5'-P-CCNC: 48 U/L (ref 8–55)
ANALYZER IN CARDIO: (no result)
ANION GAP SERPL CALC-SCNC: 13 MMOL/L (ref 10–20)
AST SERPL-CCNC: 42 U/L (ref 5–34)
BASE EXCESS STD BLDA CALC-SCNC: 1.6 MEQ/L
BILIRUB SERPL-MCNC: 1 MG/DL (ref 0.2–1.2)
BUN SERPL-MCNC: 29 MG/DL (ref 9.8–20.1)
CA-I BLDA-SCNC: 1.1 MMOL/L (ref 1.12–1.3)
CALCIUM SERPL-MCNC: 8.3 MG/DL (ref 7.8–10.44)
CHLORIDE SERPL-SCNC: 102 MMOL/L (ref 98–107)
CO2 SERPL-SCNC: 24 MMOL/L (ref 23–31)
CREAT CL PREDICTED SERPL C-G-VRATE: 33 ML/MIN (ref 70–130)
GLOBULIN SER CALC-MCNC: 3 G/DL (ref 2.4–3.5)
GLUCOSE SERPL-MCNC: 220 MG/DL (ref 83–110)
HCO3 BLDA-SCNC: 25.8 MEQ/L (ref 22–28)
HCT VFR BLDA CALC: 26 % (ref 36–47)
HGB BLD-MCNC: 9.4 G/DL (ref 12–16)
HGB BLDA-MCNC: 8.9 G/DL (ref 12–16)
MAGNESIUM SERPL-MCNC: 1.7 MG/DL (ref 1.6–2.6)
MCH RBC QN AUTO: 31.2 PG (ref 27–31)
MCV RBC AUTO: 88.3 FL (ref 78–98)
MDIFF COMPLETE?: YES
PCO2 BLDA: 39.2 MMHG (ref 35–45)
PH BLDA: 7.44 [PH] (ref 7.35–7.45)
PLATELET # BLD AUTO: 75 THOU/UL (ref 130–400)
PLATELET BLD QL SMEAR: (no result)
PO2 BLDA: 73.9 MMHG (ref 70–?)
POTASSIUM BLD-SCNC: 3.1 MMOL/L (ref 3.7–5.3)
POTASSIUM SERPL-SCNC: 3.1 MMOL/L (ref 3.5–5.1)
RBC # BLD AUTO: 3.01 MILL/UL (ref 4.2–5.4)
SODIUM SERPL-SCNC: 136 MMOL/L (ref 136–145)
SPECIMEN DRAWN FROM PATIENT: (no result)
WBC # BLD AUTO: 7.7 THOU/UL (ref 4.8–10.8)

## 2018-08-07 RX ADMIN — INSULIN LISPRO PRN UNIT: 100 INJECTION, SOLUTION INTRAVENOUS; SUBCUTANEOUS at 00:06

## 2018-08-07 RX ADMIN — ALBUTEROL SULFATE SCH MG: 2.5 SOLUTION RESPIRATORY (INHALATION) at 18:22

## 2018-08-07 RX ADMIN — INSULIN LISPRO PRN UNIT: 100 INJECTION, SOLUTION INTRAVENOUS; SUBCUTANEOUS at 18:16

## 2018-08-07 RX ADMIN — INSULIN LISPRO PRN UNIT: 100 INJECTION, SOLUTION INTRAVENOUS; SUBCUTANEOUS at 12:47

## 2018-08-07 RX ADMIN — INSULIN LISPRO PRN UNIT: 100 INJECTION, SOLUTION INTRAVENOUS; SUBCUTANEOUS at 05:07

## 2018-08-07 RX ADMIN — HYDROCORTISONE SCH: 10 CREAM TOPICAL at 01:43

## 2018-08-07 RX ADMIN — ALBUTEROL SULFATE SCH MG: 2.5 SOLUTION RESPIRATORY (INHALATION) at 23:11

## 2018-08-07 RX ADMIN — Medication SCH ML: at 20:43

## 2018-08-07 RX ADMIN — INSULIN GLARGINE SCH MLS: 100 INJECTION, SOLUTION SUBCUTANEOUS at 20:42

## 2018-08-07 RX ADMIN — INSULIN GLARGINE SCH MLS: 100 INJECTION, SOLUTION SUBCUTANEOUS at 09:03

## 2018-08-07 RX ADMIN — ALBUTEROL SULFATE SCH MG: 2.5 SOLUTION RESPIRATORY (INHALATION) at 12:56

## 2018-08-07 RX ADMIN — HYDROCORTISONE SCH: 10 CREAM TOPICAL at 09:04

## 2018-08-07 RX ADMIN — HYDROCORTISONE SCH: 10 CREAM TOPICAL at 20:58

## 2018-08-07 RX ADMIN — ALBUTEROL SULFATE SCH MG: 2.5 SOLUTION RESPIRATORY (INHALATION) at 06:23

## 2018-08-07 RX ADMIN — Medication SCH ML: at 09:05

## 2018-08-07 NOTE — PDOC.PN
- Subjective


Encounter Start Date: 08/07/18


Encounter Start Time: 10:40


-: non-verbal





PT orally intubated, arousable.





No acute events overnight, no f/C, no N/V/D/c





no family at bedside





ROS not obtainable due to intubated status





- Objective


Resuscitation Status: 


 











Resuscitation Status           FULL:Full Resuscitation














MAR Reviewed: Yes


Vital Signs & Weight: 


 Vital Signs (12 hours)











  Temp Pulse Resp BP Pulse Ox


 


 08/07/18 12:56   85  13   99


 


 08/07/18 12:00  99.4 F   17  


 


 08/07/18 10:46   85   147/44 H 


 


 08/07/18 10:00    15  


 


 08/07/18 09:02   83   162/40 H 


 


 08/07/18 08:00  99.7 F H  83  10 L   98


 


 08/07/18 06:24   83   148/47 H 


 


 08/07/18 06:23   85  10 L   98


 


 08/07/18 05:50    11 L  


 


 08/07/18 05:11   92   178/51 H 


 


 08/07/18 04:00  99.2 F   11 L  


 


 08/07/18 02:00    14  


 


 08/07/18 01:21   87   183/50 H 








 Weight











Admit Weight                   194 lb 7.163 oz


 


Weight                         215 lb 2.738 oz











 Most Recent Monitor Data











Heart Rate from ECG            86


 


NIBP                           171/55


 


NIBP BP-Mean                   82


 


Respiration from ECG           17


 


SpO2                           99














I&O: 


 











 08/06/18 08/07/18 08/08/18





 06:59 06:59 06:59


 


Intake Total 2135 1765.1 100


 


Output Total 2056 1070 100


 


Balance 79 695.1 0











Result Diagrams: 


 08/07/18 05:00





 08/07/18 05:00


Additional Labs: 


 Accuchecks











  08/07/18 08/07/18 08/07/18





  12:45 05:02 00:05


 


POC Glucose  212 H  217 H  206 H














  08/06/18





  17:26


 


POC Glucose  232 H











Radiology Reviewed by me: Yes





Phys Exam





- Physical Examination


Constitutional: NAD


HEENT: PERRLA, moist MMs, sclera anicteric, oral pharynx no lesions


orally intubated


Neck: no nodes, no JVD, supple, full ROM


coarse bilateral breath sounds


Cardiovascular: RRR, no significant murmur, no rub


Gastrointestinal: positive bowel sounds


tense, distended


Musculoskeletal: edema present


Lymphatic: no nodes


Skin: no rash, normal turgor, cap refill <2 seconds





Dx/Plan


(1) Acute respiratory failure


Code(s): J96.00 - ACUTE RESPIRATORY FAILURE, UNSP W HYPOXIA OR HYPERCAPNIA   

Status: Acute   


Qualifiers: 


   Respiratory failure complication: hypoxia   Qualified Code(s): J96.01 - 

Acute respiratory failure with hypoxia   


Comment: on ventilator, pulm managing   





(2) CKD (chronic kidney disease) stage 5, GFR less than 15 ml/min


Code(s): N18.5 - CHRONIC KIDNEY DISEASE, STAGE 5   Status: Chronic   Comment: 

on HD at present   





(3) PEG tube malfunction


Code(s): K94.23 - GASTROSTOMY MALFUNCTION   Status: Acute   





(4) Thrombocytopenia


Code(s): D69.6 - THROMBOCYTOPENIA, UNSPECIFIED   Status: Acute   Comment: 

stable at 75K   





(5) Hypokalemia


Code(s): E87.6 - HYPOKALEMIA   Status: Resolved   





(6) NORA (acute kidney injury)


Code(s): N17.9 - ACUTE KIDNEY FAILURE, UNSPECIFIED   Status: Acute   Comment: 

stable   





(7) Acute encephalopathy


Code(s): G93.40 - ENCEPHALOPATHY, UNSPECIFIED   Status: Resolved   Comment: sec 

to massive cva - stable   





(8) Dysphagia


Code(s): R13.10 - DYSPHAGIA, UNSPECIFIED   Status: Acute   


Qualifiers: 


   Dysphagia type: unspecified   Qualified Code(s): R13.10 - Dysphagia, 

unspecified   


Comment: s/p peg   





(9) FTT (failure to thrive) in adult


Status: Chronic   





(10) Hemiplegia affecting left nondominant side


Code(s): G81.94 - HEMIPLEGIA, UNSPECIFIED AFFECTING LEFT NONDOMINANT SIDE   

Status: Chronic   


Qualifiers: 


   Hemiplegia type: flaccid 





(11) Metabolic acidosis


Code(s): E87.2 - ACIDOSIS   Status: Resolved   





(12) Diabetes


Code(s): E11.9 - TYPE 2 DIABETES MELLITUS WITHOUT COMPLICATIONS   Status: 

Chronic   


Qualifiers: 


   Diabetes mellitus type: type 2   Diabetes mellitus long term insulin use: 

without long term use   Diabetes mellitus complication status: with kidney 

complications   Diabetes mellitus complication detail: with chronic kidney 

disease   Chronic kidney disease stage: stage 4 (severe)   Qualified Code(s): 

E11.22 - Type 2 diabetes mellitus with diabetic chronic kidney disease; N18.4 - 

Chronic kidney disease, stage 4 (severe); N18.4 - Chronic kidney disease, stage 

4 (severe); N18.4 - Chronic kidney disease, stage 4 (severe); N18.4 - Chronic 

kidney disease, stage 4 (severe)   





(13) Hyperlipidemia


Code(s): E78.5 - HYPERLIPIDEMIA, UNSPECIFIED   Status: Chronic   


Qualifiers: 


   Hyperlipidemia type: unspecified   Qualified Code(s): E78.5 - Hyperlipidemia

, unspecified   





(14) Hypertension


Code(s): I10 - ESSENTIAL (PRIMARY) HYPERTENSION   Status: Chronic   


Qualifiers: 


   Hypertension type: essential hypertension   Qualified Code(s): I10 - 

Essential (primary) hypertension   





(15) Hypertensive urgency


Code(s): I16.0 - HYPERTENSIVE URGENCY   Status: Resolved   





- Plan





* .

## 2018-08-07 NOTE — PQF
DATE:   8-7-18                                                               
ATTN:  DR. ADARSH BRO



Please exercise your independent, professional judgment in responding to the 
clarification form. 

Clinical indicators are provided on the bottom of this form for your review



Please check appropriate box(s) to clarify if the following diagnosis has been 
ruled in or  ruled out:

____________SEPSIS_____________________________________________



[ x ] Ruled in diagnosis

     [ x ] Continue to treat        [  ] Resolved

[  ] Ruled out diagnosis

[  ] Other diagnosis ___________

[  ] Unable to determine



In addition, please specify:

Present on Admission (POA):  [ x ] Yes             [  ] No             [  ] 
Unable to determine



For continuity of documentation, please document condition throughout progress 
notes and discharge summary.  Thank You.



CLINICAL INDICATORS - SIGNS / SYMPTOMS / LABS



H&P:   PEG TUBE MALFUNCTION, POSSIBLE SEPSIS,  HYPOGLYCEMIA, HYPERNATREMIA, 
MODERATE TO SEVERE DEHYDRATION



PROGRESS NOTE DR. THOMPSON 8-1-18:    ACUTE METABOLIC ACIDOSIS,  ACUTE 
THROMBOCYTOPENIA, ABDOMINAL WOUND



WBC:  7-25-18:   13.6,   

           7-28-18:   11.2,  

           7-29-18:   11.3,  

           8-3-18:   12.7



BANDS;   7-28-18:   5

               8-4-18:   19

               8-5-18:   17

               8-6-18:   15

               8-7-18:   16



TEMP:  8-7-18:   99.7,  99.7



RISK FACTORS: H&P:  H/O CVA, DM 2,  HTN,  HX OF DYSPHAGIA WITH PEG TUBE FEEDING



TREATMENTS:   MAR:   MAXIPIME

                          7-28-18:   BC DRAWN

                          H&P:   SEPTIC WORKUP,  EMPIRICALLY ON ZOSYN



(This form is maintained as a part of the permanent medical record)

 2015 [employer].  All Rights Reserved

[email or web address]    Office:  [phone]

                                                              

 

HAYLEE

## 2018-08-07 NOTE — PRG
DATE OF SERVICE:  08/07/2018

 

SERVICE:  Renal Medicine.

 

SUBJECTIVE:  Ms. Purdy is a 73-year-old black female with known history of chronic renal failure, 
hemodialysis has been initiated due to volume overload.  I did review the chest x-ray today and it sh
ows increased lung markings.  She is currently on TPN.  Eventual plan is to have a tracheostomy in [facility] if she could not be weaned off.  She has been receiving daily dialysis.

 

No acute events.

 

OBJECTIVE:

VITAL SIGNS:  Blood pressure is currently noted at 140/47, O2 sat 97%, temperature 99.2, heart rate 8
5.

GENERAL EXAM:  Patient is sedated and intubated on ventilator support.

SKIN:  Adequate turgor.

HEENT:  Slightly pale conjunctivae.  Anicteric sclerae.

NECK:  No neck mass, no carotid bruits, no JVD.

LUNGS:  Decreased breath sounds.

HEART:  Normal sinus rhythm.  No murmur, no gallops, no rubs.

ABDOMEN:  Globular, soft, nontender.  Positive for wound VAC and PEG tube.

EXTREMITIES:  Positive for edema.

 

Medications of 08/07/2018 were reviewed.

 

LABORATORY DATA:  Laboratories of 08/07/2018, white count 7.7, hemoglobin 9.4.  Sodium 136, potassium
 3.1, chloride 102, carbon dioxide 24, BUN 29, creatinine 2.41, glucose 220, phosphorus 1.6, magnesiu
m 1.7, AST 42, ALT 48, albumin 2.5.

 

ASSESSMENT AND PLAN:

1.  Chronic renal failure - most likely the patient has end-stage renal disease.  Continue current he
modialysis regimen.  My plan is to do a 4-hour hemodialysis with this patient with maximal fluid rocael
ele as tolerated by the patient.

2.  Anemia - continuing weekly Epogen - p.r.n. blood transfusion.

3.  Hypokalemia - we will be using a 4-0 potassium bath with dialysis today.

4.  Mild hypophosphatemia - adjust TPN.  Consult pharmacy.

 

Overall, prognosis with the patient remains poor.  Continue supportive care.  Family still pursuing a
ggressive management.

## 2018-08-07 NOTE — PRG
DATE OF SERVICE:  08/07/2018 

 

Thirty-five minutes critical care time.

 

The patient remains intubated on mechanical ventilation.  I could not get her to wake up this morning
, but she was on a fentanyl drip.

 

PHYSICAL EXAMINATION:

VITAL SIGNS:  Her temperature is 99.2 with no fever overnight, pulse 85, blood pressure 140/47, O2 sa
t 97%.  Total intake for the last 24 hours 1765, output 1070 +3000 by dialysis.

HEENT:  Pupils are reactive.  Sclerae icteric.  Oropharynx; ET tube in place.

NECK:  No JVD.

LUNGS:  Clear without wheezing or rhonchi.

CARDIAC:  S1 and S2 regular.

ABDOMEN:  Soft, nontender.  PEG tube noted.  Wound VAC noted.

EXTREMITIES:  2+ edema throughout.

 

LABORATORY DATA:  White blood count 7.7, hematocrit 26.5, platelet count 75,000.  PH 7.44, pCO2 of 39
, pO2 of 74 that is on SIMV rate 10, tidal volume 500, PEEP 5, pressure 10, FiO2 25%.  Sodium 136, po
tassium 3.1, chloride 102, CO2 24, BUN 29, creatinine 2.4, glucose 220.

 

ASSESSMENT:

1.  Acute respiratory failure requiring mechanical ventilation - status post one failed extubation se
condary to inability to clear oropharyngeal secretions.

2.  Acute on chronic renal failure.

3.  Cerebrovascular accident.

4.  Hypertension.

 

PLAN:  Still have not run into the family.  I need to discussed with them what they want done in the 
event of another extubation failure.  I am very concerned the patient's neurologic status is such esha
t she will not tolerate extubation due to inability to clear secretions.

 

For the time being I am on putting her on CPAP pressure support ventilation.  We will hold her fentan
yl drip and just treat her with Ativan as needed for breakthrough agitation.  She is starting to rece
makenzie enteral tube feeds again.  Her prognosis is extremely poor.

## 2018-08-07 NOTE — RAD
CHEST ONE VIEW:

 

HISTORY:

Dyspnea.  Intubated.  Followup.

 

COMPARISON:

08/06/2018

 

FINDINGS:

The cardiac silhouette is magnified and enlarged.  The pulmonary vasculature remains engorged.  Patch
y bibasilar infiltrates, right greater than left, are similar in appearance to the previous exam.

 

The mediastinum is midline.  Lines and tubes appear unchanged in position.  Cardiac monitor leads ove
rly the chest.

 

IMPRESSION:

Stable radiographic appearance of the chest.

 

POS: MELISSA

## 2018-08-07 NOTE — PRG
DATE OF SERVICE:  08/07/2018

 

Simon Purdy is doing well today.  She is tolerating her tube feeds.  She remains on the ventilat
or.  Dr. Gates is talking to the family regarding long-term care.  She possible may need a tracheos
della.

 

PHYSICAL EXAMINATION:

LUNGS:  Clear to auscultation.

CARDIAC:  Regular rate and rhythm without murmur or gallop.

ABDOMEN:  Soft, plus bowel sounds.

 

LABORATORY DATA:  White count 7, hemoglobin 9.4.  Basic metabolic profile unremarkable.  Potassium 3.
1.

 

ASSESSMENT AND PLAN:

1.  Tolerating tube feedings.  Discontinue TPN.

2.  Respiratory failure, tracheostomy per pending discussion, Dr. Gates with family.

3.  Devastating stroke, long-term poor prognosis, LTAC destination in the future.

## 2018-08-08 LAB
ALBUMIN SERPL BCG-MCNC: 2.6 G/DL (ref 3.4–4.8)
ALP SERPL-CCNC: 212 U/L (ref 40–150)
ALT SERPL W P-5'-P-CCNC: 43 U/L (ref 8–55)
ANALYZER IN CARDIO: (no result)
ANION GAP SERPL CALC-SCNC: 11 MMOL/L (ref 10–20)
ANISOCYTOSIS BLD QL SMEAR: (no result) (100X)
AST SERPL-CCNC: 56 U/L (ref 5–34)
BASE EXCESS STD BLDA CALC-SCNC: 3.4 MEQ/L
BILIRUB SERPL-MCNC: 2 MG/DL (ref 0.2–1.2)
BUN SERPL-MCNC: 22 MG/DL (ref 9.8–20.1)
CA-I BLDA-SCNC: 1 MMOL/L (ref 1.12–1.3)
CALCIUM SERPL-MCNC: 8.2 MG/DL (ref 7.8–10.44)
CHLORIDE SERPL-SCNC: 100 MMOL/L (ref 98–107)
CO2 SERPL-SCNC: 29 MMOL/L (ref 23–31)
CREAT CL PREDICTED SERPL C-G-VRATE: 35 ML/MIN (ref 70–130)
GLOBULIN SER CALC-MCNC: 3 G/DL (ref 2.4–3.5)
GLUCOSE SERPL-MCNC: 231 MG/DL (ref 83–110)
HCO3 BLDA-SCNC: 26.6 MEQ/L (ref 22–28)
HCT VFR BLDA CALC: 25 % (ref 36–47)
HGB BLD-MCNC: 10 G/DL (ref 12–16)
HGB BLDA-MCNC: 8.6 G/DL (ref 12–16)
MAGNESIUM SERPL-MCNC: 1.7 MG/DL (ref 1.6–2.6)
MCH RBC QN AUTO: 27.4 PG (ref 27–31)
MCV RBC AUTO: 89.7 FL (ref 78–98)
MDIFF COMPLETE?: YES
O2 A-A PPRESDIFF RESPIRATORY: 56.3 MM[HG] (ref 0–20)
PCO2 BLDA: 34.8 MMHG (ref 35–45)
PH BLDA: 7.5 [PH] (ref 7.35–7.45)
PLATELET # BLD AUTO: 48 THOU/UL (ref 130–400)
PLATELET BLD QL SMEAR: (no result)
PO2 BLDA: 77.2 MMHG (ref 70–?)
POLYCHROMASIA BLD QL SMEAR: (no result) (100X)
POTASSIUM BLD-SCNC: 3.5 MMOL/L (ref 3.7–5.3)
POTASSIUM SERPL-SCNC: 3.6 MMOL/L (ref 3.5–5.1)
RBC # BLD AUTO: 3.66 MILL/UL (ref 4.2–5.4)
SODIUM SERPL-SCNC: 136 MMOL/L (ref 136–145)
SPECIMEN DRAWN FROM PATIENT: (no result)
WBC # BLD AUTO: 7.8 THOU/UL (ref 4.8–10.8)

## 2018-08-08 RX ADMIN — Medication SCH ML: at 09:47

## 2018-08-08 RX ADMIN — ALBUTEROL SULFATE SCH MG: 2.5 SOLUTION RESPIRATORY (INHALATION) at 07:40

## 2018-08-08 RX ADMIN — ALBUTEROL SULFATE SCH MG: 2.5 SOLUTION RESPIRATORY (INHALATION) at 12:51

## 2018-08-08 RX ADMIN — INSULIN LISPRO PRN UNIT: 100 INJECTION, SOLUTION INTRAVENOUS; SUBCUTANEOUS at 15:27

## 2018-08-08 RX ADMIN — INSULIN GLARGINE SCH MLS: 100 INJECTION, SOLUTION SUBCUTANEOUS at 21:38

## 2018-08-08 RX ADMIN — HYDROCORTISONE SCH: 10 CREAM TOPICAL at 09:50

## 2018-08-08 RX ADMIN — HYDROCORTISONE SCH: 10 CREAM TOPICAL at 21:09

## 2018-08-08 RX ADMIN — INSULIN LISPRO PRN UNIT: 100 INJECTION, SOLUTION INTRAVENOUS; SUBCUTANEOUS at 09:48

## 2018-08-08 RX ADMIN — INSULIN GLARGINE SCH MLS: 100 INJECTION, SOLUTION SUBCUTANEOUS at 09:47

## 2018-08-08 RX ADMIN — ALBUTEROL SULFATE SCH MG: 2.5 SOLUTION RESPIRATORY (INHALATION) at 18:38

## 2018-08-08 RX ADMIN — Medication SCH ML: at 21:09

## 2018-08-08 NOTE — RAD
PORTABLE CHEST:

 

Date:  08/08/18 

 

HISTORY:  

On ventilator. 

CCU follow-up. 

 

COMPARISON:  

08/07/18. 

 

FINDINGS/IMPRESSION: 

There is cardiomegaly and mild vascular congestion. There is hazy alveolar infiltrate throughout the 
right lung which could represent asymmetric edema or inflammatory process. I cannot exclude small eff
usions. 

 

 

POS: KEL

## 2018-08-08 NOTE — PRG
DATE OF SERVICE:  08/08/2018

 

Thirty-five minutes critical care time.

 

SUBJECTIVE:  The patient remains intubated on mechanical ventilation.  She is 
on basically no sedation at this time.  She will move around to stimulation, 
but does not follow commands consistently.

 

PHYSICAL EXAMINATION:

VITAL SIGNS:  On exam, temperature is 99.0 with a T-max of 100.1, pulse 90, 
blood pressure 168/51.  A 24-hour intake 1765, output 1070 including 4100 in 
dialysis removed yesterday.

HEENT EXAM:  Pupils 3 mm and reactive.  Sclerae icteric.  Oropharynx:  ET tube 
in place.

NECK:  No JVD.

CHEST:  Clear.

CARDIAC:  S1 and S2, regular.

ABDOMEN:  Soft, nontender.

EXTREMITIES:  Edematous throughout.

 

IMAGING:  Her chest x-ray shows no acute change.  She has pulmonary edema 
versus infiltrate, which is most prominent in the right chest.

 

LABORATORY DATA:  White blood cell count 7.8, hematocrit 32.8, platelet count 
48.  PH 7.50, pCO2 of 34, pO2 of 77 that is on CPAP 5, pressure support 10, 
FiO2 25%.  Sodium 136, potassium 3.6, chloride 100, CO2 of 29, BUN 22, 
creatinine 2.1, glucose 231.

 

ASSESSMENT:

1.  Acute respiratory failure, requiring mechanical ventilation.

2.  Renal insufficiency, requiring hemodialysis.

3.  Cerebrovascular accident.

4.  Hypertension.

 

PLAN:  I met with the patient's daughter yesterday.  I have told her that I had 
grave concerns that the patient would be able to maintain her airway once 
extubated, and based on that, she may end up needing a tracheostomy.  I was 
very direct with her regarding the future of the patient's care should she had 
to have a tracheostomy.  The patient wanted to discuss this with her family and 
get back with me.  I think that is reasonable.  In the meantime, we will 
continue present care, which is basically supportive in nature with mechanical 
ventilation and tube feedings.

 

The above encompassed 35 minutes critical care time.

 

30 minute meeting with daughter and son today. The son seems a little reluctant 
to withdraw care and says they need more time to make a family decision.
HAYLEE

## 2018-08-09 LAB
ALBUMIN SERPL BCG-MCNC: 2.7 G/DL (ref 3.4–4.8)
ALP SERPL-CCNC: 275 U/L (ref 40–150)
ALT SERPL W P-5'-P-CCNC: 43 U/L (ref 8–55)
ANALYZER IN CARDIO: (no result)
ANION GAP SERPL CALC-SCNC: 14 MMOL/L (ref 10–20)
AST SERPL-CCNC: 74 U/L (ref 5–34)
BASE EXCESS STD BLDA CALC-SCNC: 3.3 MEQ/L
BILIRUB SERPL-MCNC: 2.2 MG/DL (ref 0.2–1.2)
BUN SERPL-MCNC: 39 MG/DL (ref 9.8–20.1)
CA-I BLDA-SCNC: 1.1 MMOL/L (ref 1.12–1.3)
CALCIUM SERPL-MCNC: 8.6 MG/DL (ref 7.8–10.44)
CHLORIDE SERPL-SCNC: 99 MMOL/L (ref 98–107)
CO2 SERPL-SCNC: 27 MMOL/L (ref 23–31)
CREAT CL PREDICTED SERPL C-G-VRATE: 23 ML/MIN (ref 70–130)
GLOBULIN SER CALC-MCNC: 3.4 G/DL (ref 2.4–3.5)
GLUCOSE SERPL-MCNC: 198 MG/DL (ref 83–110)
HCO3 BLDA-SCNC: 26.8 MEQ/L (ref 22–28)
HCT VFR BLDA CALC: 26 % (ref 36–47)
HGB BLD-MCNC: 9.1 G/DL (ref 12–16)
HGB BLDA-MCNC: 8.8 G/DL (ref 12–16)
MAGNESIUM SERPL-MCNC: 1.7 MG/DL (ref 1.6–2.6)
MCH RBC QN AUTO: 31 PG (ref 27–31)
MCV RBC AUTO: 90.2 FL (ref 78–98)
MDIFF COMPLETE?: YES
PCO2 BLDA: 36 MMHG (ref 35–45)
PH BLDA: 7.49 [PH] (ref 7.35–7.45)
PLATELET # BLD AUTO: 58 THOU/UL (ref 130–400)
PLATELET BLD QL SMEAR: (no result)
PO2 BLDA: 86.3 MMHG (ref 70–?)
POTASSIUM BLD-SCNC: 3.7 MMOL/L (ref 3.7–5.3)
POTASSIUM SERPL-SCNC: 3.7 MMOL/L (ref 3.5–5.1)
RBC # BLD AUTO: 2.92 MILL/UL (ref 4.2–5.4)
SODIUM SERPL-SCNC: 136 MMOL/L (ref 136–145)
SPECIMEN DRAWN FROM PATIENT: (no result)
WBC # BLD AUTO: 7.6 THOU/UL (ref 4.8–10.8)

## 2018-08-09 RX ADMIN — HYDROCORTISONE SCH: 10 CREAM TOPICAL at 11:44

## 2018-08-09 RX ADMIN — INSULIN GLARGINE SCH MLS: 100 INJECTION, SOLUTION SUBCUTANEOUS at 12:54

## 2018-08-09 RX ADMIN — Medication SCH ML: at 12:56

## 2018-08-09 RX ADMIN — ALBUTEROL SULFATE SCH MG: 2.5 SOLUTION RESPIRATORY (INHALATION) at 19:15

## 2018-08-09 RX ADMIN — ALBUTEROL SULFATE SCH MG: 2.5 SOLUTION RESPIRATORY (INHALATION) at 00:26

## 2018-08-09 RX ADMIN — ALBUTEROL SULFATE SCH MG: 2.5 SOLUTION RESPIRATORY (INHALATION) at 07:13

## 2018-08-09 RX ADMIN — ALBUTEROL SULFATE SCH MG: 2.5 SOLUTION RESPIRATORY (INHALATION) at 12:32

## 2018-08-09 RX ADMIN — Medication SCH ML: at 21:21

## 2018-08-09 RX ADMIN — HYDROCORTISONE SCH: 10 CREAM TOPICAL at 20:11

## 2018-08-09 RX ADMIN — INSULIN LISPRO PRN UNIT: 100 INJECTION, SOLUTION INTRAVENOUS; SUBCUTANEOUS at 05:09

## 2018-08-09 RX ADMIN — INSULIN GLARGINE SCH MLS: 100 INJECTION, SOLUTION SUBCUTANEOUS at 20:11

## 2018-08-09 NOTE — PDOC.PN
- Subjective


Encounter Start Date: 08/09/18


Encounter Start Time: 11:30


-: non-verbal





Pt awake, no changes.  febrile to 100.9 earlier, down to 100.0 now





family meeting with PC RN now regarding trach, current plan is to 'decide by 

monday'





No acute events otherwise





ROS not obtainable





- Objective


Resuscitation Status: 


 











Resuscitation Status           FULL:Full Resuscitation














MAR Reviewed: Yes


Vital Signs & Weight: 


 Vital Signs (12 hours)











  Temp Pulse Resp BP


 


 08/09/18 12:32   95   162/50 H


 


 08/09/18 12:00    19 


 


 08/09/18 11:00  99.4 F   


 


 08/09/18 10:36   92   163/49 H


 


 08/09/18 10:00    21 H 


 


 08/09/18 08:00  100.0 F H  84  21 H 


 


 08/09/18 07:14   88   168/51 H


 


 08/09/18 06:00    22 H 


 


 08/09/18 05:08   92   171/53 H


 


 08/09/18 04:03   92  


 


 08/09/18 04:00  100.5 F H   


 


 08/09/18 03:53    24 H 


 


 08/09/18 02:00    27 H 








 Weight











Admit Weight                   194 lb 7.163 oz


 


Weight                         203 lb 0.732 oz











 Most Recent Monitor Data











Heart Rate from ECG            91


 


NIBP                           172/53


 


NIBP BP-Mean                   108


 


Respiration from ECG           16


 


SpO2                           97














I&O: 


 











 08/08/18 08/09/18 08/10/18





 06:59 06:59 06:59


 


Intake Total 1740.2 1065 


 


Output Total 


 


Balance 950.2 930 -1045











Result Diagrams: 


 08/09/18 05:10





 08/09/18 05:10


Additional Labs: 


 Accuchecks











  08/09/18 08/09/18 08/08/18





  05:01 00:11 15:26


 


POC Glucose  191 H  176 H  186 H














Phys Exam





- Physical Examination


Constitutional: NAD


HEENT: PERRLA, moist MMs, sclera anicteric, oral pharynx no lesions


orally intubated


Neck: no nodes, no JVD, supple, full ROM


Respiratory: no wheezing, no rhonchi, clear to auscultation bilateral


Cardiovascular: RRR, no significant murmur, no rub


Gastrointestinal: soft, non-tender, no distention, positive bowel sounds


Musculoskeletal: pulses present, edema present


Left hemiplegia and neglect - stable


Lymphatic: no nodes


Skin: no rash, normal turgor, cap refill <2 seconds





Dx/Plan


(1) Acute respiratory failure


Code(s): J96.00 - ACUTE RESPIRATORY FAILURE, UNSP W HYPOXIA OR HYPERCAPNIA   

Status: Acute   


Qualifiers: 


   Respiratory failure complication: hypoxia   Qualified Code(s): J96.01 - 

Acute respiratory failure with hypoxia   


Comment: on ventilator, pulm managing.  Not weaning   





(2) CKD (chronic kidney disease) stage 5, GFR less than 15 ml/min


Code(s): N18.5 - CHRONIC KIDNEY DISEASE, STAGE 5   Status: Chronic   Comment: 

on HD at present   





(3) PEG tube malfunction


Code(s): K94.23 - GASTROSTOMY MALFUNCTION   Status: Resolved   





(4) Thrombocytopenia


Code(s): D69.6 - THROMBOCYTOPENIA, UNSPECIFIED   Status: Acute   Comment: 

stable at 75K   





(5) Hypokalemia


Code(s): E87.6 - HYPOKALEMIA   Status: Resolved   





(6) NORA (acute kidney injury)


Code(s): N17.9 - ACUTE KIDNEY FAILURE, UNSPECIFIED   Status: Acute   Comment: 

stable   





(7) Acute encephalopathy


Code(s): G93.40 - ENCEPHALOPATHY, UNSPECIFIED   Status: Resolved   Comment: sec 

to massive cva - stable   





(8) Dysphagia


Code(s): R13.10 - DYSPHAGIA, UNSPECIFIED   Status: Acute   


Qualifiers: 


   Dysphagia type: unspecified   Qualified Code(s): R13.10 - Dysphagia, 

unspecified   


Comment: s/p peg   





(9) FTT (failure to thrive) in adult


Status: Chronic   





(10) Hemiplegia affecting left nondominant side


Code(s): G81.94 - HEMIPLEGIA, UNSPECIFIED AFFECTING LEFT NONDOMINANT SIDE   

Status: Chronic   


Qualifiers: 


   Hemiplegia type: flaccid 





(11) Metabolic acidosis


Code(s): E87.2 - ACIDOSIS   Status: Resolved   





(12) Diabetes


Code(s): E11.9 - TYPE 2 DIABETES MELLITUS WITHOUT COMPLICATIONS   Status: 

Chronic   


Qualifiers: 


   Diabetes mellitus type: type 2   Diabetes mellitus long term insulin use: 

without long term use   Diabetes mellitus complication status: with kidney 

complications   Diabetes mellitus complication detail: with chronic kidney 

disease   Chronic kidney disease stage: stage 4 (severe)   Qualified Code(s): 

E11.22 - Type 2 diabetes mellitus with diabetic chronic kidney disease; N18.4 - 

Chronic kidney disease, stage 4 (severe); N18.4 - Chronic kidney disease, stage 

4 (severe); N18.4 - Chronic kidney disease, stage 4 (severe); N18.4 - Chronic 

kidney disease, stage 4 (severe)   





(13) Hyperlipidemia


Code(s): E78.5 - HYPERLIPIDEMIA, UNSPECIFIED   Status: Chronic   


Qualifiers: 


   Hyperlipidemia type: unspecified   Qualified Code(s): E78.5 - Hyperlipidemia

, unspecified   





(14) Hypertension


Code(s): I10 - ESSENTIAL (PRIMARY) HYPERTENSION   Status: Chronic   


Qualifiers: 


   Hypertension type: essential hypertension   Qualified Code(s): I10 - 

Essential (primary) hypertension   





(15) Hypertensive urgency


Code(s): I16.0 - HYPERTENSIVE URGENCY   Status: Resolved   





- Plan





* .

## 2018-08-09 NOTE — RAD
PORTABLE CHEST:

 

History: Respiratory distress. 

 

Comparison: Prior day's exam. 

 

FINDINGS: 

Endotracheal tube is in satisfactory position. Central lines appear unchanged in position. Parenchyma
l changes show some improvement to the right basilar lung change. 

 

IMPRESSION: 

Slight improvement to the right basilar lung change. Otherwise, essentially stable chest. 

 

POS: MELISSA

## 2018-08-09 NOTE — PRG
DATE OF SERVICE:  08/09/2018

 

SERVICE:  Renal Medicine.

 

SUBJECTIVE:  Ms. Purdy is a 73-year-old black female with chronic renal failure/end-stage renal di
sease and currently being followed by the Renal Service for her maintenance hemodialysis.  I placed h
er on 3 times a week hemodialysis.  I am currently at the bedside supervising her dialysis.  She was 
noted to have a low grade fever to date.  Blood culture has been ordered.  No other acute events.  Th
e patient is still not a candidate for extubation.  Tracheostomy is being considered.

 

OBJECTIVE:

VITAL SIGNS:  Blood pressure is 159/46, heart rate 88, respiratory rate 20, temperature is 100, pulse
 ox 99%.

GENERAL:  The patient is arousable, intubated on ventilator support.

SKIN:  Adequate turgor.

HEENT:  She has slightly pale conjunctivae, anicteric sclerae.

NECK:  No neck mass, no carotid bruits, no JVD.

CHEST:  No deformities.

LUNGS:  Decreased breath sounds.

HEART:  Normal sinus rhythm.  No murmur, no gallops, no rubs.

ABDOMEN:  Globular, soft, nontender, no masses.  Positive for surgical wound plus wound VAC.

EXTREMITIES:  No edema.

 

MEDICATIONS:  Of 08/09/2018 was reviewed.

 

LABORATORY DATA:  Of 08/09/2018, white count 7.6, hemoglobin 9.1.  Sodium 136, potassium 3.7, chlorid
e 99, carbon dioxide 27, BUN 39, creatinine 3.27, glucose 190, magnesium 1.7, phosphorus 1.6.  Albumi
n is 2.7.

 

ASSESSMENT AND PLAN:

1.  Chronic renal failure/end-stage renal disease - Continue maintenance hemodialysis of Tuesday, Thu
rsday, and Saturday.  Again, fluid removal as tolerated.  I am at the bedside supervising her dialysi
s.  Attempting 3.5 liter of fluid removal.  Please note, the patient still on TPN.

2.  Mild hypophosphatemia, adjusting TPN.

3.  Anemia, continuing weekly Epogen.

4.  Status post cerebrovascular accident - left hemiplegia, supportive care.

5.  Acute respiratory failure - patient is being considered for tracheostomy.

6.  Fever, on cefepime.  Repeat blood culture x2.  Overall, prognosis remains guarded.

## 2018-08-09 NOTE — PRG
DATE OF SERVICE:  08/09/2018

 

This is 35 minutes critical care time.

 

Ms. Purdy remains intubated on mechanical ventilation.  Her family has not gotten back to me with 
a decision regarding potential tracheostomy placement.  The patient has been off sedation for quite s
ome time and I do not see any changes in her overall condition.

 

PHYSICAL EXAMINATION:

VITAL SIGNS:  Her temperature is 100.5 with a T-max of 100.9, pulse 86, blood pressure 163/50, 24-blas
r intake 1065, output 135.  Also, she had about 4100 taken out by dialysis yesterday.

HEENT:  Unremarkable.

NECK:  No JVD.

LUNGS:  Clear, but distant breath sounds.

CARDIAC:  S1 and S2 regular.

ABDOMEN:  Soft.

EXTREMITIES:  Edematous throughout.

 

LABORATORY DATA:  White blood cell count 7.6, hematocrit 26.4, platelet count 58, pH 7.49, pCO2 36, p
O2 of 86 on CPAP 5, pressure support 10.  Sodium 136, potassium 3.7, chloride 99, CO2 27, BUN 39, cre
atinine 3.2, glucose 198.

 

ASSESSMENT:

1.  Acute on chronic respiratory failure requiring mechanical ventilation.

2.  Severe neurologic deficit, status post stroke.

3.  Renal insufficiency requiring hemodialysis.

4.  Thrombocytopenia.

 

PLAN:  Again, I do not have any confidence that this patient can maintain her airway, extubated.  I t
hink the family is faced with the decision of either putting a tracheostomy in and enduring rehab and
 perhaps nursing home placement versus extubation and comfort care type measures.  I believe her long
-term prognosis is extremely poor with whatever choice the family makes.

## 2018-08-09 NOTE — PDOC.PN
- Subjective


Encounter Start Date: 08/08/18


Encounter Start Time: 09:00


-: non-verbal





remained intubated, arousable, following simple commands.





No fevers overnight, no N/V, no diarrhea.  no fmaily at bedside.





ROs not obtainable





- Objective


Resuscitation Status: 


 











Resuscitation Status           FULL:Full Resuscitation














MAR Reviewed: Yes


Vital Signs & Weight: 


 Vital Signs (12 hours)











  Temp Pulse Resp BP


 


 08/09/18 12:32   95   162/50 H


 


 08/09/18 12:00    19 


 


 08/09/18 11:00  99.4 F   


 


 08/09/18 10:36   92   163/49 H


 


 08/09/18 10:00    21 H 


 


 08/09/18 08:00  100.0 F H  84  21 H 


 


 08/09/18 07:14   88   168/51 H


 


 08/09/18 06:00    22 H 


 


 08/09/18 05:08   92   171/53 H


 


 08/09/18 04:03   92  


 


 08/09/18 04:00  100.5 F H   


 


 08/09/18 03:53    24 H 


 


 08/09/18 02:00    27 H 








 Weight











Admit Weight                   194 lb 7.163 oz


 


Weight                         203 lb 0.732 oz











 Most Recent Monitor Data











Heart Rate from ECG            91


 


NIBP                           172/53


 


NIBP BP-Mean                   108


 


Respiration from ECG           16


 


SpO2                           97














I&O: 


 











 08/08/18 08/09/18 08/10/18





 06:59 06:59 06:59


 


Intake Total 1740.2 1065 


 


Output Total 


 


Balance 950.2 930 -1045











Result Diagrams: 


 08/09/18 05:10





 08/09/18 05:10


Additional Labs: 


 Accuchecks











  08/09/18 08/09/18 08/08/18





  05:01 00:11 15:26


 


POC Glucose  191 H  176 H  186 H











Radiology Reviewed by me: Yes


EKG Reviewed by me: Yes





Phys Exam





- Physical Examination


Constitutional: NAD


HEENT: PERRLA, moist MMs, sclera anicteric, oral pharynx no lesions


orally intubated


Neck: no nodes, no JVD, supple, full ROM


Respiratory: no wheezing, no rhonchi


bibasilar crackles, no wheezes or rhonchi


Cardiovascular: RRR, no rub


Gastrointestinal: soft, non-tender, positive bowel sounds


distended and tympanitic


Musculoskeletal: edema present


Left hemiplegia, neglect


Lymphatic: no nodes


Skin: no rash, normal turgor, cap refill <2 seconds





Dx/Plan


(1) Acute respiratory failure


Code(s): J96.00 - ACUTE RESPIRATORY FAILURE, UNSP W HYPOXIA OR HYPERCAPNIA   

Status: Acute   


Qualifiers: 


   Respiratory failure complication: hypoxia   Qualified Code(s): J96.01 - 

Acute respiratory failure with hypoxia   


Comment: on ventilator, pulm managing.  Not weaning   





(2) CKD (chronic kidney disease) stage 5, GFR less than 15 ml/min


Code(s): N18.5 - CHRONIC KIDNEY DISEASE, STAGE 5   Status: Chronic   Comment: 

on HD at present   





(3) PEG tube malfunction


Code(s): K94.23 - GASTROSTOMY MALFUNCTION   Status: Resolved   





(4) Thrombocytopenia


Code(s): D69.6 - THROMBOCYTOPENIA, UNSPECIFIED   Status: Acute   Comment: 

stable at 75K   





(5) Hypokalemia


Code(s): E87.6 - HYPOKALEMIA   Status: Resolved   





(6) NORA (acute kidney injury)


Code(s): N17.9 - ACUTE KIDNEY FAILURE, UNSPECIFIED   Status: Acute   Comment: 

stable   





(7) Acute encephalopathy


Code(s): G93.40 - ENCEPHALOPATHY, UNSPECIFIED   Status: Resolved   Comment: sec 

to massive cva - stable   





(8) Dysphagia


Code(s): R13.10 - DYSPHAGIA, UNSPECIFIED   Status: Acute   


Qualifiers: 


   Dysphagia type: unspecified   Qualified Code(s): R13.10 - Dysphagia, 

unspecified   


Comment: s/p peg   





(9) FTT (failure to thrive) in adult


Status: Chronic   





(10) Hemiplegia affecting left nondominant side


Code(s): G81.94 - HEMIPLEGIA, UNSPECIFIED AFFECTING LEFT NONDOMINANT SIDE   

Status: Chronic   


Qualifiers: 


   Hemiplegia type: flaccid 





(11) Metabolic acidosis


Code(s): E87.2 - ACIDOSIS   Status: Resolved   





(12) Diabetes


Code(s): E11.9 - TYPE 2 DIABETES MELLITUS WITHOUT COMPLICATIONS   Status: 

Chronic   


Qualifiers: 


   Diabetes mellitus type: type 2   Diabetes mellitus long term insulin use: 

without long term use   Diabetes mellitus complication status: with kidney 

complications   Diabetes mellitus complication detail: with chronic kidney 

disease   Chronic kidney disease stage: stage 4 (severe)   Qualified Code(s): 

E11.22 - Type 2 diabetes mellitus with diabetic chronic kidney disease; N18.4 - 

Chronic kidney disease, stage 4 (severe); N18.4 - Chronic kidney disease, stage 

4 (severe); N18.4 - Chronic kidney disease, stage 4 (severe); N18.4 - Chronic 

kidney disease, stage 4 (severe)   





(13) Hyperlipidemia


Code(s): E78.5 - HYPERLIPIDEMIA, UNSPECIFIED   Status: Chronic   


Qualifiers: 


   Hyperlipidemia type: unspecified   Qualified Code(s): E78.5 - Hyperlipidemia

, unspecified   





(14) Hypertension


Code(s): I10 - ESSENTIAL (PRIMARY) HYPERTENSION   Status: Chronic   


Qualifiers: 


   Hypertension type: essential hypertension   Qualified Code(s): I10 - 

Essential (primary) hypertension   





(15) Hypertensive urgency


Code(s): I16.0 - HYPERTENSIVE URGENCY   Status: Resolved   





- Plan





* .

## 2018-08-10 LAB
ALBUMIN SERPL BCG-MCNC: 2.6 G/DL (ref 3.4–4.8)
ALP SERPL-CCNC: 267 U/L (ref 40–150)
ALT SERPL W P-5'-P-CCNC: 40 U/L (ref 8–55)
ANION GAP SERPL CALC-SCNC: 10 MMOL/L (ref 10–20)
APTT PPP: 38.3 SEC (ref 22.9–36.1)
AST SERPL-CCNC: 72 U/L (ref 5–34)
BILIRUB SERPL-MCNC: 2 MG/DL (ref 0.2–1.2)
BUN SERPL-MCNC: 35 MG/DL (ref 9.8–20.1)
CALCIUM SERPL-MCNC: 8.5 MG/DL (ref 7.8–10.44)
CHD RISK SERPL-RTO: 6.3 (ref ?–4.5)
CHLORIDE SERPL-SCNC: 99 MMOL/L (ref 98–107)
CHOLEST SERPL-MCNC: 95 MG/DL
CO2 SERPL-SCNC: 31 MMOL/L (ref 23–31)
CREAT CL PREDICTED SERPL C-G-VRATE: 26 ML/MIN (ref 70–130)
GLOBULIN SER CALC-MCNC: 3.2 G/DL (ref 2.4–3.5)
GLUCOSE SERPL-MCNC: 126 MG/DL (ref 83–110)
HDLC SERPL-MCNC: 15 MG/DL
HGB BLD-MCNC: 8.1 G/DL (ref 12–16)
INR PPP: 1.2
LDLC SERPL CALC-MCNC: 56 MG/DL
MAGNESIUM SERPL-MCNC: 1.5 MG/DL (ref 1.6–2.6)
MCH RBC QN AUTO: 31.1 PG (ref 27–31)
MCV RBC AUTO: 90.4 FL (ref 78–98)
MDIFF COMPLETE?: YES
PLATELET # BLD AUTO: 47 THOU/UL (ref 130–400)
PLATELET BLD QL SMEAR: (no result)
POTASSIUM SERPL-SCNC: 4.2 MMOL/L (ref 3.5–5.1)
PROTHROMBIN TIME: 15.1 SEC (ref 12–14.7)
RBC # BLD AUTO: 2.61 MILL/UL (ref 4.2–5.4)
SODIUM SERPL-SCNC: 136 MMOL/L (ref 136–145)
TRIGL SERPL-MCNC: 122 MG/DL (ref ?–150)
WBC # BLD AUTO: 6.7 THOU/UL (ref 4.8–10.8)

## 2018-08-10 RX ADMIN — INSULIN GLARGINE SCH MLS: 100 INJECTION, SOLUTION SUBCUTANEOUS at 21:30

## 2018-08-10 RX ADMIN — HYDROCORTISONE SCH: 10 CREAM TOPICAL at 21:30

## 2018-08-10 RX ADMIN — Medication SCH ML: at 09:07

## 2018-08-10 RX ADMIN — HYDROCORTISONE SCH: 10 CREAM TOPICAL at 09:07

## 2018-08-10 RX ADMIN — Medication SCH: at 21:42

## 2018-08-10 RX ADMIN — ALBUTEROL SULFATE SCH MG: 2.5 SOLUTION RESPIRATORY (INHALATION) at 23:44

## 2018-08-10 RX ADMIN — ALBUTEROL SULFATE SCH MG: 2.5 SOLUTION RESPIRATORY (INHALATION) at 01:50

## 2018-08-10 RX ADMIN — INSULIN GLARGINE SCH MLS: 100 INJECTION, SOLUTION SUBCUTANEOUS at 09:44

## 2018-08-10 RX ADMIN — ALBUTEROL SULFATE SCH MG: 2.5 SOLUTION RESPIRATORY (INHALATION) at 18:17

## 2018-08-10 RX ADMIN — ALBUTEROL SULFATE SCH MG: 2.5 SOLUTION RESPIRATORY (INHALATION) at 07:43

## 2018-08-10 RX ADMIN — ALBUTEROL SULFATE SCH MG: 2.5 SOLUTION RESPIRATORY (INHALATION) at 12:26

## 2018-08-10 NOTE — PDOC.PN
- Subjective


Encounter Start Date: 08/10/18


Encounter Start Time: 09:30


-: non-verbal





Pt orally intubated, awake and alert, denies and F/C, no CP or SOB, no other 

pain, no N/V/d/C.  TF off for possible trach this afternoon with Dr Petersen, Dr Petersen to meet with family this morning





No acute overnight events





ROS not obtainable





- Objective


Resuscitation Status: 


 











Resuscitation Status           FULL:Full Resuscitation














MAR Reviewed: Yes


Vital Signs & Weight: 


 Vital Signs (12 hours)











  Temp Pulse Resp BP Pulse Ox


 


 08/10/18 09:55   82   185/55 H 


 


 08/10/18 09:06   86   171/54 H 


 


 08/10/18 08:00  99.4 F  86  20   99


 


 08/10/18 07:44   86   171/54 H 


 


 08/10/18 06:00    18  


 


 08/10/18 04:34   88   184/56 H 


 


 08/10/18 04:00    22 H  


 


 08/10/18 02:03   88   


 


 08/10/18 01:50      99








 Weight











Admit Weight                   194 lb 7.163 oz


 


Weight                         201 lb 9.6 oz











 Most Recent Monitor Data











Heart Rate from ECG            81


 


NIBP                           158/50


 


NIBP BP-Mean                   118


 


Respiration from ECG           20


 


SpO2                           99














I&O: 


 











 08/09/18 08/10/18 08/11/18





 06:59 06:59 06:59


 


Intake Total 1065 1146 20


 


Output Total 135 1165 18


 


Balance 930 -19 2











Result Diagrams: 


 08/10/18 03:15





 08/10/18 03:15


Additional Labs: 


 Accuchecks











  08/09/18 08/09/18 08/09/18





  20:18 15:59 11:43


 


POC Glucose  137 H  164 H  147 H











Radiology Reviewed by me: Yes


EKG Reviewed by me: Yes





Phys Exam





- Physical Examination


Constitutional: NAD


HEENT: PERRLA, moist MMs, sclera anicteric, oral pharynx no lesions


Neck: no nodes, no JVD, supple, full ROM


Respiratory: no wheezing, no rales, no rhonchi, clear to auscultation bilateral


Cardiovascular: RRR, no significant murmur, no rub


Gastrointestinal: soft, non-tender, no distention, positive bowel sounds


PEG C/D/I


Musculoskeletal: pulses present, edema present


left hemiplegia


Lymphatic: no nodes


Psychiatric: normal affect


Skin: no rash, normal turgor, cap refill <2 seconds





Dx/Plan


(1) Acute respiratory failure


Code(s): J96.00 - ACUTE RESPIRATORY FAILURE, UNSP W HYPOXIA OR HYPERCAPNIA   

Status: Acute   


Qualifiers: 


   Respiratory failure complication: hypoxia   Qualified Code(s): J96.01 - 

Acute respiratory failure with hypoxia   


Comment: on ventilator, pulm managing.  Not weaning   





(2) CKD (chronic kidney disease) stage 5, GFR less than 15 ml/min


Code(s): N18.5 - CHRONIC KIDNEY DISEASE, STAGE 5   Status: Chronic   Comment: 

on HD at present   





(3) PEG tube malfunction


Code(s): K94.23 - GASTROSTOMY MALFUNCTION   Status: Resolved   





(4) Thrombocytopenia


Code(s): D69.6 - THROMBOCYTOPENIA, UNSPECIFIED   Status: Acute   Comment: 

stable at 75K   





(5) Hypokalemia


Code(s): E87.6 - HYPOKALEMIA   Status: Resolved   





(6) NORA (acute kidney injury)


Code(s): N17.9 - ACUTE KIDNEY FAILURE, UNSPECIFIED   Status: Acute   Comment: 

stable   





(7) Acute encephalopathy


Code(s): G93.40 - ENCEPHALOPATHY, UNSPECIFIED   Status: Resolved   Comment: sec 

to massive cva - stable   





(8) Dysphagia


Code(s): R13.10 - DYSPHAGIA, UNSPECIFIED   Status: Acute   


Qualifiers: 


   Dysphagia type: unspecified   Qualified Code(s): R13.10 - Dysphagia, 

unspecified   


Comment: s/p peg   





(9) FTT (failure to thrive) in adult


Status: Chronic   





(10) Hemiplegia affecting left nondominant side


Code(s): G81.94 - HEMIPLEGIA, UNSPECIFIED AFFECTING LEFT NONDOMINANT SIDE   

Status: Chronic   


Qualifiers: 


   Hemiplegia type: flaccid 





(11) Metabolic acidosis


Code(s): E87.2 - ACIDOSIS   Status: Resolved   





(12) Diabetes


Code(s): E11.9 - TYPE 2 DIABETES MELLITUS WITHOUT COMPLICATIONS   Status: 

Chronic   


Qualifiers: 


   Diabetes mellitus type: type 2   Diabetes mellitus long term insulin use: 

without long term use   Diabetes mellitus complication status: with kidney 

complications   Diabetes mellitus complication detail: with chronic kidney 

disease   Chronic kidney disease stage: stage 4 (severe)   Qualified Code(s): 

E11.22 - Type 2 diabetes mellitus with diabetic chronic kidney disease; N18.4 - 

Chronic kidney disease, stage 4 (severe); N18.4 - Chronic kidney disease, stage 

4 (severe); N18.4 - Chronic kidney disease, stage 4 (severe); N18.4 - Chronic 

kidney disease, stage 4 (severe)   





(13) Hyperlipidemia


Code(s): E78.5 - HYPERLIPIDEMIA, UNSPECIFIED   Status: Chronic   


Qualifiers: 


   Hyperlipidemia type: unspecified   Qualified Code(s): E78.5 - Hyperlipidemia

, unspecified   





(14) Hypertension


Code(s): I10 - ESSENTIAL (PRIMARY) HYPERTENSION   Status: Chronic   


Qualifiers: 


   Hypertension type: essential hypertension   Qualified Code(s): I10 - 

Essential (primary) hypertension   





(15) Hypertensive urgency


Code(s): I16.0 - HYPERTENSIVE URGENCY   Status: Resolved   





- Plan





* .

## 2018-08-10 NOTE — PRG
DATE OF SERVICE:  08/10/2018

 

RENAL MEDICINE

 

SUBJECTIVE:  Ms. Purdy is a 73-year-old black female with acute kidney injury/chronic renal failur
e and has been placed on maintenance hemodialysis due to severe volume overload.  No evidence of luis armando
l recovery.  She still could not be weaned from her ventilator.  The plan is to consider tracheostomy
.  Please note, she recently was status post cerebrovascular accident with left hemiplegia.

VITAL SIGNS:  Blood pressure 171/54, heart rate 86, respiratory rate 20, pulse ox 99%.

GENERAL:  The patient is awake.  Can follow simple commands, intubated on ventilator support.

SKIN:  Adequate turgor.

HEENT:  Slightly pale conjunctivae, anicteric sclerae.

NECK:  No neck mass, no carotid bruits, no JVD.

CHEST:  No deformities.

LUNGS:  Decreased breath sounds.

HEART:  Normal sinus rhythm.  No murmurs, no gallops, no rubs.

ABDOMEN:  Globular, soft, nontender, no masses.  Positive for PEG tube.

EXTREMITIES:  No edema.

 

MEDICATIONS:  Medications of 08/10/2018 was reviewed.

 

LABORATORY DATA:  Laboratories of 08/10/2018; white count 6.7, hemoglobin 8.1.  Sodium 136, potassium
 4.2, chloride 99, carbon dioxide 31, BUN 35, creatinine 2.85, glucose 126, magnesium 1.5, phosphorus
 2.2, alkaline phosphatase 267, albumin 2.6.

 

ASSESSMENT AND PLAN:

1.  Chronic renal failure/end-stage renal disease - patient most likely has reached end-stage renal d
isease.  Continue current 3 times a week hemodialysis regimen.  Again, fluid removal only as tolerate
d by the patient.  There is no indication for any emergent hemodialysis today.

2.  Acute respiratory failure.  The patient could not be weaned off.  The plan is to have a tracheost
evangelista if family will agree with this.

3.  Anemia, continuing weekly Epogen, p.r.n. blood transfusion.

## 2018-08-10 NOTE — RAD
CHEST 1 VIEW:

 

Date:  08/10/18 

 

HISTORY:  

Dyspnea. Respiratory distress. 

 

COMPARISON:  

08/09/18. 

 

FINDINGS:

Cardiac silhouette is magnified and enlarged. Pulmonary vasculature remains engorged. Opacity through
out the right chest has increased since the prior study. Mediastinum is midline. Lines and tubes appe
ar unchanged in position. No evidence of pneumothorax. 

 

IMPRESSION: 

1.  Opacification of the right hemithorax has reworsened since the previous exam. Possible right pleu
ral fluid. 

 

2.  Cardiomegaly and pulmonary vascular congestion are otherwise stable. 

 

 

POS: [facility]

## 2018-08-10 NOTE — PRG
DATE OF SERVICE:  08/10/2018

 

SUBJECTIVE:  Simon Purdy is doing well.

 

OBJECTIVE:

LUNGS:  Clear to auscultation.  She is awake on the ventilator.

CARDIAC:  Regular rate and rhythm without murmur or gallop.

ABDOMEN:  Soft.

 

I have discussed with Dr. Gates.  The patient is felt that she would not do well off the ventilator
 and family has not consented to tracheostomy nor they consented to a DNR or palliative care.

 

Today, I had a long discussion with the patient's daughter, Gerber, and she has another family member 
on the line that is dialysis nurse.  We had a long discussion regarding her care.  I recommended laz
tment options either tracheostomy with or without DNR, transferred to an LTAC versus DNR and extubati
on with agreement that she would not be intubated thus avoiding tracheostomy.  Family, however, does 
not want to make a decision until Monday (today is Friday).  I will be out of town next week.  Dr. Estephanie vines is covering for me.  If she needs tracheostomy, please call Dr. Reed.  Her gastrostomy tube 
is functioning well.

 

Her laboratories are acceptable.  Her renal function is improving.  BUN 35, creatinine 2.8, GFR 20.  
Urine output 165 for 24 hours.

 

ASSESSMENT:

1.  Oliguric renal failure with a past history of chronic kidney disease.  I doubt she will enjoy muc
h renal recovery and most likely is destined for long-term dialysis.

2.  Respiratory failure, on ventilator.  We will need tracheostomy most likely versus hospice care.

3.  Large stroke, nonambulatory, doubt that she will be ambulation in the future and have discussed w
ith family risk of decubitus, urinary tract infection, pneumonia associated with her immobility.

## 2018-08-10 NOTE — PRG
DATE OF SERVICE:  08/10/2018

 

Thirty-five minutes critical care time. 

 

The patient remains intubated on mechanical ventilation.  The family has not decided how they want to
 proceed with her care yet.

 

PHYSICAL EXAMINATION:

VITAL SIGNS:  Temperature is 98, pulse 85, blood pressure 175/52.  24 hour intake 1146, output 1165.

HEENT:  Unremarkable.

NECK:  No JVD.  Endotracheal tube in place.

LUNGS:  Coarse breath sounds.

CARDIAC:  S1 and S2 regular.

ABDOMEN:  Soft.

EXTREMITIES:  Trace edema.

 

LABORATORY DATA:  White blood cell count 6.7, hematocrit 23.6, platelet count 47.  INR 1.2.  Sodium 1
36, potassium 4.2, chloride 99, CO2 31, BUN 35, creatinine 2.8, glucose 126.

 

ASSESSMENT:

1.  Disabling stroke.

2.  Acute respiratory failure with 1 failed extubation.

3.  Severe neurologic deficit after the stroke.

4.  Renal insufficiency requiring hemodialysis.

5.  Thrombocytopenia.

 

PLAN:   We are still trying to get the family to give us guidance as to what direction they want to g
o in terms of tracheostomy versus extubation and comfort measures.  In the meantime we are continuing
 supportive care.

## 2018-08-11 LAB
ANION GAP SERPL CALC-SCNC: 11 MMOL/L (ref 10–20)
BUN SERPL-MCNC: 51 MG/DL (ref 9.8–20.1)
CALCIUM SERPL-MCNC: 8.4 MG/DL (ref 7.8–10.44)
CHLORIDE SERPL-SCNC: 98 MMOL/L (ref 98–107)
CO2 SERPL-SCNC: 29 MMOL/L (ref 23–31)
CREAT CL PREDICTED SERPL C-G-VRATE: 19 ML/MIN (ref 70–130)
GLUCOSE SERPL-MCNC: 73 MG/DL (ref 83–110)
HGB BLD-MCNC: 8.1 G/DL (ref 12–16)
MCH RBC QN AUTO: 31.4 PG (ref 27–31)
MCV RBC AUTO: 90.7 FL (ref 78–98)
MDIFF COMPLETE?: YES
PLATELET # BLD AUTO: 31 THOU/UL (ref 130–400)
PLATELET BLD QL SMEAR: (no result)
POTASSIUM SERPL-SCNC: 4.4 MMOL/L (ref 3.5–5.1)
RBC # BLD AUTO: 2.56 MILL/UL (ref 4.2–5.4)
SODIUM SERPL-SCNC: 134 MMOL/L (ref 136–145)
TARGETS BLD QL SMEAR: (no result) (100X)
WBC # BLD AUTO: 6.7 THOU/UL (ref 4.8–10.8)

## 2018-08-11 RX ADMIN — INSULIN GLARGINE SCH MLS: 100 INJECTION, SOLUTION SUBCUTANEOUS at 20:14

## 2018-08-11 RX ADMIN — HYDROCORTISONE SCH: 10 CREAM TOPICAL at 20:25

## 2018-08-11 RX ADMIN — ALBUTEROL SULFATE SCH MG: 2.5 SOLUTION RESPIRATORY (INHALATION) at 18:46

## 2018-08-11 RX ADMIN — ALBUTEROL SULFATE SCH MG: 2.5 SOLUTION RESPIRATORY (INHALATION) at 13:07

## 2018-08-11 RX ADMIN — Medication SCH ML: at 20:25

## 2018-08-11 RX ADMIN — Medication SCH ML: at 09:09

## 2018-08-11 RX ADMIN — INSULIN GLARGINE SCH: 100 INJECTION, SOLUTION SUBCUTANEOUS at 09:41

## 2018-08-11 RX ADMIN — HYDROCORTISONE SCH: 10 CREAM TOPICAL at 09:08

## 2018-08-11 RX ADMIN — ALBUTEROL SULFATE SCH MG: 2.5 SOLUTION RESPIRATORY (INHALATION) at 07:38

## 2018-08-11 RX ADMIN — CLONIDINE SCH MG: 0.3 PATCH TRANSDERMAL at 20:13

## 2018-08-11 NOTE — PDOC.PN
- Subjective


Encounter Start Date: 08/11/18


Encounter Start Time: 08:55


-: non-verbal





Intubated.





Dr Petersen spoke to family yestrerday, it was too soon for them to make a 

deciison, treach decision postponed until MONDAy








febrile to 100.8 today.  arousable, no other complaints.  nochange in left 

hemiplegia





ROS not obtainalbe





- Objective


Resuscitation Status: 


 











Resuscitation Status           FULL:Full Resuscitation














MAR Reviewed: Yes


Vital Signs & Weight: 


 Vital Signs (12 hours)











  Temp Pulse Resp BP Pulse Ox


 


 08/11/18 13:08   86   


 


 08/11/18 12:00  100.1 F H   20  


 


 08/11/18 10:00    14  


 


 08/11/18 09:54   81   


 


 08/11/18 09:07   81   159/53 H 


 


 08/11/18 08:00  100.8 F H  82  20   99


 


 08/11/18 07:34   81   


 


 08/11/18 07:00  100.8 F H    


 


 08/11/18 06:00    22 H  


 


 08/11/18 04:00  100.2 F H   18  


 


 08/11/18 02:00    24 H  








 Weight











Admit Weight                   194 lb 7.163 oz


 


Weight                         200 lb 9.6 oz











 Most Recent Monitor Data











Heart Rate from ECG            81


 


NIBP                           167/56


 


NIBP BP-Mean                   125


 


Respiration from ECG           19


 


SpO2                           99














I&O: 


 











 08/10/18 08/11/18 08/12/18





 06:59 06:59 06:59


 


Intake Total 1146 1293 


 


Output Total 1165 1046 140


 


Balance -19 247 -140











Result Diagrams: 


 08/11/18 05:10





 08/11/18 05:10


Additional Labs: 


 Accuchecks











  08/11/18 08/10/18 08/10/18





  00:44 21:29 16:45


 


POC Glucose  96  110  107














Phys Exam





- Physical Examination


Constitutional: NAD


HEENT: PERRLA, moist MMs, sclera anicteric, oral pharynx no lesions


Neck: no nodes, no JVD, supple, full ROM


Respiratory: no rales, clear to auscultation bilateral


Cardiovascular: RRR, no significant murmur, no rub


Gastrointestinal: soft, non-tender, positive bowel sounds


Musculoskeletal: edema present


left hemiplegia and neglect


Lymphatic: no nodes


Skin: no rash, normal turgor, cap refill <2 seconds





Dx/Plan


(1) Acute respiratory failure


Code(s): J96.00 - ACUTE RESPIRATORY FAILURE, UNSP W HYPOXIA OR HYPERCAPNIA   

Status: Acute   


Qualifiers: 


   Respiratory failure complication: hypoxia   Qualified Code(s): J96.01 - 

Acute respiratory failure with hypoxia   


Comment: on ventilator, pulm managing.  Not weaning   





(2) CKD (chronic kidney disease) stage 5, GFR less than 15 ml/min


Code(s): N18.5 - CHRONIC KIDNEY DISEASE, STAGE 5   Status: Chronic   Comment: 

on HD at present   





(3) PEG tube malfunction


Code(s): K94.23 - GASTROSTOMY MALFUNCTION   Status: Resolved   





(4) Thrombocytopenia


Code(s): D69.6 - THROMBOCYTOPENIA, UNSPECIFIED   Status: Acute   Comment: 

stable at 75K   





(5) Hypokalemia


Code(s): E87.6 - HYPOKALEMIA   Status: Resolved   





(6) NORA (acute kidney injury)


Code(s): N17.9 - ACUTE KIDNEY FAILURE, UNSPECIFIED   Status: Acute   Comment: 

stable   





(7) Acute encephalopathy


Code(s): G93.40 - ENCEPHALOPATHY, UNSPECIFIED   Status: Resolved   Comment: sec 

to massive cva - stable   





(8) Dysphagia


Code(s): R13.10 - DYSPHAGIA, UNSPECIFIED   Status: Acute   


Qualifiers: 


   Dysphagia type: unspecified   Qualified Code(s): R13.10 - Dysphagia, 

unspecified   


Comment: s/p peg   





(9) FTT (failure to thrive) in adult


Status: Chronic   





(10) Hemiplegia affecting left nondominant side


Code(s): G81.94 - HEMIPLEGIA, UNSPECIFIED AFFECTING LEFT NONDOMINANT SIDE   

Status: Chronic   


Qualifiers: 


   Hemiplegia type: flaccid 





(11) Metabolic acidosis


Code(s): E87.2 - ACIDOSIS   Status: Resolved   





(12) Diabetes


Code(s): E11.9 - TYPE 2 DIABETES MELLITUS WITHOUT COMPLICATIONS   Status: 

Chronic   


Qualifiers: 


   Diabetes mellitus type: type 2   Diabetes mellitus long term insulin use: 

without long term use   Diabetes mellitus complication status: with kidney 

complications   Diabetes mellitus complication detail: with chronic kidney 

disease   Chronic kidney disease stage: stage 4 (severe)   Qualified Code(s): 

E11.22 - Type 2 diabetes mellitus with diabetic chronic kidney disease; N18.4 - 

Chronic kidney disease, stage 4 (severe); N18.4 - Chronic kidney disease, stage 

4 (severe); N18.4 - Chronic kidney disease, stage 4 (severe); N18.4 - Chronic 

kidney disease, stage 4 (severe)   





(13) Hyperlipidemia


Code(s): E78.5 - HYPERLIPIDEMIA, UNSPECIFIED   Status: Chronic   


Qualifiers: 


   Hyperlipidemia type: unspecified   Qualified Code(s): E78.5 - Hyperlipidemia

, unspecified   





(14) Hypertension


Code(s): I10 - ESSENTIAL (PRIMARY) HYPERTENSION   Status: Chronic   


Qualifiers: 


   Hypertension type: essential hypertension   Qualified Code(s): I10 - 

Essential (primary) hypertension   





(15) Hypertensive urgency


Code(s): I16.0 - HYPERTENSIVE URGENCY   Status: Resolved   





- Plan





* .

## 2018-08-11 NOTE — PRG
DATE OF SERVICE:  08/11/2018

 

SUBJECTIVE:  Ms. Purdy is a 73-year-old black female who  seen by the [facility] for her chroni
c renal failure.  She has been initiated on hemodialysis due to volume overload and progressive worse
meggan of the renal dysfunction.  She is currently on maintenance hemodialysis Tuesday, Thursday, and S
aturday.  I have scheduled her for regular dialysis today.  No acute events.  Family still contemplat
ing whether to proceed with a tracheostomy or not.  She has not been able to be weaned off from her r
espirator.

 

No acute events.

 

OBJECTIVE:

VITAL SIGNS:  Blood pressure is 155/52, heart rate 82, respiratory rate 20, temperature 100.8, pulse 
ox 99%.

GENERAL:  Noted to be sleepy, but arousable, not in distress.  The patient is intubated, on ventilato
r support.

HEENT:  Slightly pale conjunctivae, anicteric sclerae.

NECK:  No neck mass, no carotid bruits, no JVD.

CHEST:  No deformities.

LUNGS:  Decreased breath sounds.

HEART:  Normal sinus rhythm.  No murmur, no gallops, no rubs.

ABDOMEN:  Globular, soft, nontender, no masses.  Positive for PEG tube.

EXTREMITIES:  No edema.

 

MEDICATIONS:  Of 08/11/2018, was reviewed.

 

LABORATORY DATA:  Of 08/11/2018, white count 6.7, hemoglobin 8.1.  Sodium 134, potassium 4.4, chlorid
e 98, carbon dioxide 29, BUN 51, creatinine 3.91, GFR 14 mL per minute, glucose 73, calcium 8.4.

 

ASSESSMENT AND PLAN:

1.  Chronic renal failure - currently on maintenance hemodialysis.  Continuing 3 times a week hemodia
lysis regimen with this patient.  Fluid removal only as tolerated.

2.  Anemia, currently on weekly Epogen, p.r.n. blood transfusion.

3.  Status post cerebrovascular accident - left hemiplegia.  Continue supportive care.

4.  Acute respiratory failure - patient is unable to wean off from the ventilator.  Consideration for
 tracheostomy is being made.  Family still undecided.

 

Agree with supportive care.  Overall, prognosis remains poor.

## 2018-08-11 NOTE — PRG
DATE OF SERVICE:  08/11/2018

 

SUBJECTIVE:  This patient remains on mechanical ventilation.  We are still awaiting decision from the
 family on how to proceed.

 

OBJECTIVE:

VITAL SIGNS:  Temperature 100.8, pulse 81, blood pressure 115/53, O2 sat 99%, intake for 24 hours 129
3, output 1046.

HEENT:  Unremarkable.

NECK:  No JVD.

LUNGS:  Fairly clear anteriorly.

CARDIOVASCULAR:  S1, S2 regular.

ABDOMEN:  Soft.

EXTREMITIES:  Edematous throughout.

 

LABORATORY DATA:  White blood cell count 6.7, hematocrit 23.2, platelet count 31.  Sodium 134, potass
ium 4.4, chloride 98, CO2 29, BUN 51, creatinine 3.9, glucose 173.

 

ASSESSMENT:

1.  Status post massive left-sided stroke resulting in aphagia, odynophagia and right-sided hemipares
is.

2.  Acute respiratory failure with one failed extubation secondary to retained secretions.

3.  Renal insufficiency, requiring hemodialysis.

4.  Thrombocytopenia.

 

PLAN:

1.  Continue supportive care until family makes a decision on how they want to proceed.  I have spent
 a great deal of time with him in the last week, going over the options and they still seem confused 
as to how to proceed.

2.  Continue antibiotics for the time being, but probably need to consider stopping those in the next
 day or two as she remains thrombocytopenic and source of infection has not been found.

## 2018-08-12 LAB
ANION GAP SERPL CALC-SCNC: 14 MMOL/L (ref 10–20)
BUN SERPL-MCNC: 38 MG/DL (ref 9.8–20.1)
CALCIUM SERPL-MCNC: 8.3 MG/DL (ref 7.8–10.44)
CHLORIDE SERPL-SCNC: 99 MMOL/L (ref 98–107)
CO2 SERPL-SCNC: 27 MMOL/L (ref 23–31)
CREAT CL PREDICTED SERPL C-G-VRATE: 25 ML/MIN (ref 70–130)
GLUCOSE SERPL-MCNC: 169 MG/DL (ref 83–110)
HGB BLD-MCNC: 8.3 G/DL (ref 12–16)
MCH RBC QN AUTO: 30.9 PG (ref 27–31)
MCV RBC AUTO: 91.5 FL (ref 78–98)
MDIFF COMPLETE?: YES
PLATELET # BLD AUTO: 24 THOU/UL (ref 130–400)
PLATELET BLD QL SMEAR: (no result)
POTASSIUM SERPL-SCNC: 4.3 MMOL/L (ref 3.5–5.1)
RBC # BLD AUTO: 2.68 MILL/UL (ref 4.2–5.4)
SODIUM SERPL-SCNC: 136 MMOL/L (ref 136–145)
TARGETS BLD QL SMEAR: (no result) (100X)
WBC # BLD AUTO: 8.1 THOU/UL (ref 4.8–10.8)

## 2018-08-12 RX ADMIN — ALBUTEROL SULFATE SCH MG: 2.5 SOLUTION RESPIRATORY (INHALATION) at 13:04

## 2018-08-12 RX ADMIN — ALBUTEROL SULFATE SCH MG: 2.5 SOLUTION RESPIRATORY (INHALATION) at 07:19

## 2018-08-12 RX ADMIN — ALBUTEROL SULFATE SCH MG: 2.5 SOLUTION RESPIRATORY (INHALATION) at 18:33

## 2018-08-12 RX ADMIN — INSULIN LISPRO PRN UNIT: 100 INJECTION, SOLUTION INTRAVENOUS; SUBCUTANEOUS at 06:09

## 2018-08-12 RX ADMIN — INSULIN GLARGINE SCH MLS: 100 INJECTION, SOLUTION SUBCUTANEOUS at 20:18

## 2018-08-12 RX ADMIN — HYDROCORTISONE SCH: 10 CREAM TOPICAL at 20:27

## 2018-08-12 RX ADMIN — INSULIN LISPRO PRN UNIT: 100 INJECTION, SOLUTION INTRAVENOUS; SUBCUTANEOUS at 13:01

## 2018-08-12 RX ADMIN — Medication SCH ML: at 20:18

## 2018-08-12 RX ADMIN — Medication SCH: at 10:35

## 2018-08-12 RX ADMIN — HYDROCORTISONE SCH: 10 CREAM TOPICAL at 08:14

## 2018-08-12 RX ADMIN — ERYTHROPOIETIN SCH UNITS: 20000 INJECTION, SOLUTION INTRAVENOUS; SUBCUTANEOUS at 12:42

## 2018-08-12 RX ADMIN — ALBUTEROL SULFATE SCH MG: 2.5 SOLUTION RESPIRATORY (INHALATION) at 00:13

## 2018-08-12 RX ADMIN — INSULIN LISPRO PRN UNIT: 100 INJECTION, SOLUTION INTRAVENOUS; SUBCUTANEOUS at 18:21

## 2018-08-12 NOTE — PRG
DATE OF SERVICE:  08/12/2018

 

35 minutes of critical care time.

 

SUBJECTIVE:  The patient remains intubated on mechanical ventilation.  There 
have been no acute changes.

 

PHYSICAL EXAMINATION:

VITAL SIGNS:  Temperature is 101.0, pulse 84, blood pressure 154/51.  24-hour 
intake 1647, output 440.

HEENT:  Unremarkable.

NECK:  No adenopathy, no JVD.

LUNGS:  Clear.

CARDIAC:  S1 and S2 regular.

ABDOMEN:  Soft.

EXTREMITIES:  No edema.

 

LABORATORY DATA:  White blood cell count 8.1, hematocrit 24.5, platelet count 24
,000.  Sodium 136, potassium 4.3, chloride 99, CO2 27, BUN 38, creatinine 2.8, 
glucose 169.

 

ASSESSMENT:  There has been no change in the patient's clinical status other 
than the fever and developing thrombocytopenia.

 

PLAN:

1.  The patient's family wants to meet again discussing the trach.  I think we 
need to stop the cefepime and reculture the patient.  The cefepime could be 
leading to the thrombocytopenia.

2.  Check for heparin-induced antibodies.

3.  Consider discontinuation of central line.

 

MTDD

## 2018-08-12 NOTE — PDOC.PN
- Subjective


Encounter Start Date: 08/12/18


Encounter Start Time: 11:00





orally tintubated,a rousable.  per nursing, family had made DNR.  Planning for 

trach on tuesday, then to LTAC.





Febrile this Am to 101.0.  BCx sent, no change otherwise





ROS not obtainable, no family in the room





- Objective


Resuscitation Status: 


 











Resuscitation Status           DNR:Do Not Resuscitate














MAR Reviewed: Yes


Vital Signs & Weight: 


 Vital Signs (12 hours)











  Temp Pulse Resp Pulse Ox


 


 08/12/18 14:00    23 H 


 


 08/12/18 13:05   86  


 


 08/12/18 12:00    16 


 


 08/12/18 11:00  99.8 F H   


 


 08/12/18 10:01   80  


 


 08/12/18 10:00    22 H 


 


 08/12/18 08:14   82  


 


 08/12/18 08:00  101.0 F H  82  24 H  98


 


 08/12/18 07:20   86  


 


 08/12/18 07:00  101.0 F H   


 


 08/12/18 06:00    27 H 


 


 08/12/18 04:00  99.9 F H   29 H 


 


 08/12/18 03:12   86  








 Weight











Admit Weight                   194 lb 7.163 oz


 


Weight                         189 lb 6.033 oz











 Most Recent Monitor Data











Heart Rate from ECG            82


 


NIBP                           166/61


 


NIBP BP-Mean                   128


 


Respiration from ECG           7


 


SpO2                           98














I&O: 


 











 08/11/18 08/12/18 08/13/18





 06:59 06:59 06:59


 


Intake Total 1293 1674 260


 


Output Total 1046 440 120


 


Balance 247 1234 140











Result Diagrams: 


 08/12/18 05:00





 08/12/18 05:00


Additional Labs: 


 Accuchecks











  08/12/18 08/12/18 08/12/18





  12:41 06:05 00:48


 


POC Glucose  211 H  170 H  145 H














  08/11/18





  18:10


 


POC Glucose  120 H














Phys Exam





- Physical Examination


Constitutional: NAD


HEENT: PERRLA, moist MMs, sclera anicteric, oral pharynx no lesions


Neck: no nodes, no JVD, supple


Respiratory: no rales, no rhonchi, clear to auscultation bilateral


Cardiovascular: RRR, no significant murmur, no rub


Gastrointestinal: soft, non-tender, no distention, positive bowel sounds


Musculoskeletal: edema present


left hemiplegia


Lymphatic: no nodes


Skin: no rash, normal turgor, cap refill <2 seconds





Dx/Plan


(1) Acute respiratory failure


Code(s): J96.00 - ACUTE RESPIRATORY FAILURE, UNSP W HYPOXIA OR HYPERCAPNIA   

Status: Acute   


Qualifiers: 


   Respiratory failure complication: hypoxia   Qualified Code(s): J96.01 - 

Acute respiratory failure with hypoxia   


Comment: on ventilator, pulm managing.  Not weaning.  trach Tuesday   





(2) CKD (chronic kidney disease) stage 5, GFR less than 15 ml/min


Code(s): N18.5 - CHRONIC KIDNEY DISEASE, STAGE 5   Status: Chronic   Comment: 

on HD at present   





(3) PEG tube malfunction


Code(s): K94.23 - GASTROSTOMY MALFUNCTION   Status: Resolved   





(4) Thrombocytopenia


Code(s): D69.6 - THROMBOCYTOPENIA, UNSPECIFIED   Status: Acute   Comment: down 

to 24K.  no signs of bleeding   





(5) Hypokalemia


Code(s): E87.6 - HYPOKALEMIA   Status: Resolved   





(6) NORA (acute kidney injury)


Code(s): N17.9 - ACUTE KIDNEY FAILURE, UNSPECIFIED   Status: Acute   Comment: 

stable   





(7) Acute encephalopathy


Code(s): G93.40 - ENCEPHALOPATHY, UNSPECIFIED   Status: Resolved   Comment: sec 

to massive cva - stable   





(8) Dysphagia


Code(s): R13.10 - DYSPHAGIA, UNSPECIFIED   Status: Acute   


Qualifiers: 


   Dysphagia type: unspecified   Qualified Code(s): R13.10 - Dysphagia, 

unspecified   


Comment: s/p peg   





(9) FTT (failure to thrive) in adult


Status: Chronic   





(10) Hemiplegia affecting left nondominant side


Code(s): G81.94 - HEMIPLEGIA, UNSPECIFIED AFFECTING LEFT NONDOMINANT SIDE   

Status: Chronic   


Qualifiers: 


   Hemiplegia type: flaccid 





(11) Metabolic acidosis


Code(s): E87.2 - ACIDOSIS   Status: Resolved   





(12) Diabetes


Code(s): E11.9 - TYPE 2 DIABETES MELLITUS WITHOUT COMPLICATIONS   Status: 

Chronic   


Qualifiers: 


   Diabetes mellitus type: type 2   Diabetes mellitus long term insulin use: 

without long term use   Diabetes mellitus complication status: with kidney 

complications   Diabetes mellitus complication detail: with chronic kidney 

disease   Chronic kidney disease stage: stage 4 (severe)   Qualified Code(s): 

E11.22 - Type 2 diabetes mellitus with diabetic chronic kidney disease; N18.4 - 

Chronic kidney disease, stage 4 (severe); N18.4 - Chronic kidney disease, stage 

4 (severe); N18.4 - Chronic kidney disease, stage 4 (severe); N18.4 - Chronic 

kidney disease, stage 4 (severe)   





(13) Hyperlipidemia


Code(s): E78.5 - HYPERLIPIDEMIA, UNSPECIFIED   Status: Chronic   


Qualifiers: 


   Hyperlipidemia type: unspecified   Qualified Code(s): E78.5 - Hyperlipidemia

, unspecified   





(14) Hypertension


Code(s): I10 - ESSENTIAL (PRIMARY) HYPERTENSION   Status: Chronic   


Qualifiers: 


   Hypertension type: essential hypertension   Qualified Code(s): I10 - 

Essential (primary) hypertension   





(15) Hypertensive urgency


Code(s): I16.0 - HYPERTENSIVE URGENCY   Status: Resolved   





- Plan





* .

## 2018-08-13 LAB
ANION GAP SERPL CALC-SCNC: 14 MMOL/L (ref 10–20)
BUN SERPL-MCNC: 66 MG/DL (ref 9.8–20.1)
CALCIUM SERPL-MCNC: 8.2 MG/DL (ref 7.8–10.44)
CHLORIDE SERPL-SCNC: 97 MMOL/L (ref 98–107)
CO2 SERPL-SCNC: 26 MMOL/L (ref 23–31)
CREAT CL PREDICTED SERPL C-G-VRATE: 17 ML/MIN (ref 70–130)
GLUCOSE SERPL-MCNC: 172 MG/DL (ref 83–110)
HGB BLD-MCNC: 8 G/DL (ref 12–16)
MCH RBC QN AUTO: 30.3 PG (ref 27–31)
MCV RBC AUTO: 90.7 FL (ref 78–98)
MDIFF COMPLETE?: YES
PLATELET # BLD AUTO: 21 THOU/UL (ref 130–400)
PLATELET BLD QL SMEAR: (no result)
POTASSIUM SERPL-SCNC: 4.3 MMOL/L (ref 3.5–5.1)
RBC # BLD AUTO: 2.63 MILL/UL (ref 4.2–5.4)
SODIUM SERPL-SCNC: 133 MMOL/L (ref 136–145)
WBC # BLD AUTO: 9.2 THOU/UL (ref 4.8–10.8)

## 2018-08-13 RX ADMIN — ALBUTEROL SULFATE SCH MG: 2.5 SOLUTION RESPIRATORY (INHALATION) at 23:29

## 2018-08-13 RX ADMIN — ALBUTEROL SULFATE SCH MG: 2.5 SOLUTION RESPIRATORY (INHALATION) at 00:38

## 2018-08-13 RX ADMIN — HYDROCORTISONE SCH: 10 CREAM TOPICAL at 10:52

## 2018-08-13 RX ADMIN — ALBUTEROL SULFATE SCH MG: 2.5 SOLUTION RESPIRATORY (INHALATION) at 18:31

## 2018-08-13 RX ADMIN — INSULIN LISPRO PRN UNIT: 100 INJECTION, SOLUTION INTRAVENOUS; SUBCUTANEOUS at 00:11

## 2018-08-13 RX ADMIN — INSULIN LISPRO PRN UNIT: 100 INJECTION, SOLUTION INTRAVENOUS; SUBCUTANEOUS at 05:51

## 2018-08-13 RX ADMIN — ALBUTEROL SULFATE SCH MG: 2.5 SOLUTION RESPIRATORY (INHALATION) at 07:36

## 2018-08-13 RX ADMIN — INSULIN GLARGINE SCH MLS: 100 INJECTION, SOLUTION SUBCUTANEOUS at 20:45

## 2018-08-13 RX ADMIN — ALBUTEROL SULFATE SCH MG: 2.5 SOLUTION RESPIRATORY (INHALATION) at 13:33

## 2018-08-13 RX ADMIN — INSULIN LISPRO PRN UNIT: 100 INJECTION, SOLUTION INTRAVENOUS; SUBCUTANEOUS at 15:03

## 2018-08-13 RX ADMIN — Medication SCH ML: at 10:36

## 2018-08-13 RX ADMIN — Medication SCH: at 22:33

## 2018-08-13 RX ADMIN — HYDROCORTISONE SCH: 10 CREAM TOPICAL at 19:40

## 2018-08-13 RX ADMIN — INSULIN LISPRO PRN UNIT: 100 INJECTION, SOLUTION INTRAVENOUS; SUBCUTANEOUS at 11:57

## 2018-08-13 NOTE — PRG
DATE OF SERVICE:  08/13/2018

 

Thirty-five minutes critical care time.

 

The patient remains intubated on mechanical ventilation.  She is much more awake today than she has b
een in the last week.

 

PHYSICAL EXAMINATION:

VITAL SIGNS:  Her temperature is 100.1 with a T-max of 101 yesterday, pulse 91, blood pressure 166/63
, 24-hour intake 1365, output 1315.

HEENT:  Unremarkable.

NECK:  No adenopathy or JVD.

LUNGS:  Fairly clear.

CARDIOVASCULAR:  S1, S2 regular.

ABDOMEN:  Soft.

EXTREMITIES:  Edematous throughout.

 

LABORATORY DATA:  White blood cell count 9.2, hematocrit 23.9, platelet count 21,000.  Sodium 133, po
tassium 4.3, chloride 97, CO2 26, BUN 66, creatinine 4.0, glucose 172.

 

ASSESSMENT:

1.  Acute respiratory failure requiring mechanical ventilation - failed extubation secondary to retai
lindsay secretions.

2.  Status post stroke.

3.  Thrombocytopenia.

4.  Fever.

 

PLAN:

1.  Stop hydralazine.

2.  Discontinue central line and start peripheral IV if possible.

3.  The patient will likely need platelets before the percutaneous trach tomorrow.  I have asked Dr. Banks to do that for us.

4.  I spoke with the patient's daughter, daughter-in-law and son yesterday.

## 2018-08-13 NOTE — PRG
DATE OF SERVICE:  08/13/2018

 

SUBJECTIVE:  The patient is intubated, but she is awake.  She is still fairly somnolent, but awakens 
easily.  She shakes her head.  Denies any specific pains or complaints.

 

OBJECTIVE:

VITAL SIGNS:  Temperature was 99.1, pulse 88, /64, O2 sat 100%.

GENERAL APPEARANCE:  The patient is intubated, but drowsy, but easily awakened.

HEART:  Regular without murmur.

LUNGS:  Clear, not breathing above the vent.

ABDOMEN:  Soft, nontender.

EXTREMITIES:  Edematous with left-sided sarcopenia.

 

LABORATORY DATA:  White count is 9.2, hemoglobin 23.9, platelets 21,000.  BUN 66, creatinine 4.0.

 

IMPRESSION AND PLAN:

1.  Acute respiratory failure.  The patient remains on the ventilator and unable to wean.  Plan for t
shubham tomorrow.

2.  Kidney disease.  The patient has advanced kidney disease.  Followed by Dr. Leahy.  Continue with he
modialysis.

3.  Thrombocytopenia, unclear etiology.  She is to get some platelets prior to the trach procedure.

4.  Encephalopathy.  The patient has been somewhat encephalopathic since her initial large right tien
spheric stroke with left hemiplegia.

5.  Dysphagia.  The patient has PEG tube in place.

6.  Left hemiplegia.

7.  Metabolic acidosis, resolved.

8.  Diabetes mellitus.  Continue with sliding scale as needed.

9.  Hypertension.  The patient continues to have some elevations of blood pressure.  Continuing to ma
nage it with p.r.n. medications.

10.  Disposition.  This patient has a generally poor prognosis given that she has had a large right h
emispheric cerebrovascular accident with left hemiplegia.  She has had a complicated hospital course 
this time including some rupture of the bowel requiring intervention with surgery.  She is now vent d
ependent requiring a trach and remains PEG dependent as well.  Hopefully, once the patient has the tr
ach in place, she may be stabilized again and potentially able to go to a long-term nursing facility.

## 2018-08-13 NOTE — PRG
DATE OF SERVICE:  08/12/2018

 

TIME OF EVALUATION:  8:30 a.m.

 

SUBJECTIVE:  Ms. Purdy is a 73-year-old black female with ESRD and being followed by the [facility] for her maintenance hemodialysis.  She underwent dialysis yesterday without any difficulty.  We 
continue to place her on a Tuesday, Thursday, and Saturday dialysis regimen.  In addition a planned t
racheostomy is being entertained by the family.  

 

PHYSICAL EXAMINATION:

VITAL SIGNS:  Blood pressure was 154/51 with heart rate 84, respiratory rate is 10, pulse ox 99%.

GENERAL:  Noted to be arousable, but sleepy, not in distress, intubated on ventilator support, HEENT:
  She has slightly pale conjunctivae, anicteric sclerae.

LUNGS:  No neck mass.  No carotid bruits, no JVD.

CHEST:  No deformities.

LUNGS:  Clear breath sounds.

HEART:  Normal sinus rhythm.  No murmur, no gallops or rubs.

ABDOMEN:  Globular, soft.  Positive for a PEG tube.

EXTREMITIES:  No edema, no deformities.

 

MEDICATIONS:  08/12/2018 - Reviewed.

 

LABORATORIES:  08/12/2018 - White count 8.1, hemoglobin 8.3, hematocrit 24.5, platelet count is 24,00
0.  Sodium was 136, potassium 4.3, chloride 99, carbon dioxide 27, BUN 38, creatinine 2.89, glucose 1
69, calcium 8.3.

 

ASSESSMENT AND PLAN:  

1.  End-stage renal disease - stable.  No indication for any dialytic intervention.  She underwent he
modialysis yesterday 08/11/2018 without any difficulty.  

2.  Acute respiratory failure - for a planned tracheostomy - unable to wean off the patient from the 
ventilator.

3.  Status post cerebrovascular accident with left hemiplegia - supportive care.

4.  Anemia, continuing weekly Epogen.  P.r.n. blood transfusion.  

 

Overall, prognosis with this patient remains guarded.

## 2018-08-13 NOTE — PRG
DATE OF SERVICE:  08/13/2018

 

SUBJECTIVE:  Ms. Purdy is a 73-year-old black female with known history of ESRD and currently on m
aintenance hemodialysis.  Patient has been evaluated by Dr. Banks for placement of a percutaneous tra
cheostomy.  She is unable to be weaned off from the ventilator.

 

OBJECTIVE:

VITAL SIGNS:  Blood pressure is 175/57, heart rate 88, respiratory rate is 14, pulse ox 99%.

GENERAL:  Noted to be arousable and follows simple commands, intubated on ventilator support.

HEENT:  Has slightly pale conjunctivae, anicteric sclerae.

NECK:  No neck mass, no carotid bruits, no JVD.

CHEST:  No deformities.

LUNGS:  Decreased breath sounds.

HEART:  Normal sinus rhythm.  No murmur, no gallops, no rubs.

ABDOMEN:  Globular, soft, nontender, no masses.

EXTREMITIES:  No edema, no deformities.

 

MEDICATIONS:  08/13/2018 was reviewed.

 

LABORATORY DATA:  08/13/2018 reviewed.

 

Please note white count is 9.2, hemoglobin is 8.  Sodium was 133, potassium 4.3, BUN 66, creatinine 4
.03 with a calcium 8.2.

 

ASSESSMENT AND PLAN:

1.  End-stage renal disease, stable.  No indication for any emergent hemodialysis.  I have scheduled 
her back for hemodialysis tomorrow.  Fluid removal only as tolerated.

2.  Anemia, continuing weekly Epogen, p.r.n. blood transfusion.

3.  Acute respiratory failure - unable to be weaned off.  Surgical consult with Dr. Banks has been do
ne for placement of a tracheostomy.

4.  Status post cerebrovascular accident with left hemiplegia.  Continue supportive care.  Overall, p
rognosis remains guarded.

## 2018-08-14 LAB
ANION GAP SERPL CALC-SCNC: 17 MMOL/L (ref 10–20)
BUN SERPL-MCNC: 88 MG/DL (ref 9.8–20.1)
CALCIUM SERPL-MCNC: 8.7 MG/DL (ref 7.8–10.44)
CHLORIDE SERPL-SCNC: 96 MMOL/L (ref 98–107)
CO2 SERPL-SCNC: 23 MMOL/L (ref 23–31)
CREAT CL PREDICTED SERPL C-G-VRATE: 14 ML/MIN (ref 70–130)
GLUCOSE SERPL-MCNC: 177 MG/DL (ref 83–110)
HGB BLD-MCNC: 8.4 G/DL (ref 12–16)
MCH RBC QN AUTO: 30.5 PG (ref 27–31)
MCV RBC AUTO: 89.2 FL (ref 78–98)
MDIFF COMPLETE?: YES
PLATELET # BLD AUTO: 41 THOU/UL (ref 130–400)
PLATELET BLD QL SMEAR: (no result)
POTASSIUM SERPL-SCNC: 4.7 MMOL/L (ref 3.5–5.1)
RBC # BLD AUTO: 2.76 MILL/UL (ref 4.2–5.4)
SODIUM SERPL-SCNC: 131 MMOL/L (ref 136–145)
VANCOMYCIN SERPL-MCNC: 17.5 UG/ML
WBC # BLD AUTO: 8.9 THOU/UL (ref 4.8–10.8)

## 2018-08-14 PROCEDURE — 0B113F4 BYPASS TRACHEA TO CUTANEOUS WITH TRACHEOSTOMY DEVICE, PERCUTANEOUS APPROACH: ICD-10-PCS | Performed by: SURGERY

## 2018-08-14 RX ADMIN — ALBUTEROL SULFATE SCH MG: 2.5 SOLUTION RESPIRATORY (INHALATION) at 18:59

## 2018-08-14 RX ADMIN — Medication SCH ML: at 20:02

## 2018-08-14 RX ADMIN — HYDROCORTISONE SCH: 10 CREAM TOPICAL at 07:32

## 2018-08-14 RX ADMIN — ALBUTEROL SULFATE SCH MG: 2.5 SOLUTION RESPIRATORY (INHALATION) at 13:14

## 2018-08-14 RX ADMIN — INSULIN GLARGINE SCH MLS: 100 INJECTION, SOLUTION SUBCUTANEOUS at 20:02

## 2018-08-14 RX ADMIN — HYDROCORTISONE SCH: 10 CREAM TOPICAL at 20:01

## 2018-08-14 RX ADMIN — Medication SCH ML: at 07:40

## 2018-08-14 RX ADMIN — ALBUTEROL SULFATE SCH MG: 2.5 SOLUTION RESPIRATORY (INHALATION) at 07:48

## 2018-08-14 NOTE — PRG
DATE OF SERVICE:  08/14/2018

 

The patient is about the same, there have been no acute changes overnight.

 

PHYSICAL EXAMINATION:

VITAL SIGNS:  Temperature is 99.7 with a T-max of 100.4, pulse 81, blood pressure 133/40, O2 sat 100%
.  Total intake for 24 hours 1371.  Urine output 370.

HEENT:  Unremarkable.

NECK:  No JVD.

CHEST:  Clear.

CARDIAC:  S1 and S2 regular.

ABDOMEN:  Soft.

EXTREMITIES:  No edema.

 

Cultures show no growth to date.

 

ASSESSMENT:

1.  Respiratory failure requiring mechanical ventilation.

2.  Large left middle cerebral artery distribution stroke.

3.  Thrombocytopenia which has improved somewhat overnight.

4.  Generalized failure to thrive.

 

PLAN:  Percutaneous tracheostomy planned for today.  Continuing vancomycin for the time being.

## 2018-08-14 NOTE — PRG
DATE OF SERVICE:  08/14/2018

 

SUBJECTIVE:  The patient is nonverbal.

 

PHYSICAL EXAMINATION:

VITAL SIGNS:  Temperature was 99, pulse 85, respirations 18, O2 sat 100%, blood pressure 151/59.

GENERAL APPEARANCE:  The patient was intubated and ventilated.

HEART:  Regular rate and rhythm.

LUNGS:  Clear bilaterally.

ABDOMEN:  Soft, nontender, nondistended with positive bowel sounds.

EXTREMITIES:  Warm and dry.  Left-sided paralysis.

 

LABORATORY DATA:  White count 8.9, hemoglobin 8.4, platelets 41,000.  Sodium 131, potassium 4.7, chlo
ride 96, BUN 88, creatinine 4.87, glucose 177.

 

IMPRESSION AND PLAN:

1.  Respiratory failure requiring ventilator support.  The patient is to have trach placed today for 
failure to wean.

2.  Kidney disease.  Followed by Dr. Leahy.  Continue with scheduled hemodialysis.

3.  Thrombocytopenia, somewhat better.  Was able to get trach scheduled for today.

4.  Encephalopathy secondary to the stroke.

5.  Dysphagia, addressed with PEG tube.

6.  Left hemiplegia, stable.

7.  Diabetes mellitus.  Continue with sliding scale.

8.  Hypertension.  Blood pressure is somewhat improved.

9.  Disposition.  Now, the patient has a PEG tube in place and will get the trach tube stabilized.  S
he may be stable for transfer to a facility where her ventilator can be managed.

## 2018-08-14 NOTE — PRG
DATE OF SERVICE:  08/14/2018

 

SERVICE:  Renal Medicine.

 

SUBJECTIVE:  Ms. Purdy is a 73-year-old black female being followed up for her chronic renal failu
re and currently on maintenance hemodialysis.  She will be undergoing dialysis later this afternoon. 
 Now the plan is for her to undergo percutaneous tracheostomy due to the inability to wean her off fr
om her ventilator.  No acute events.

 

OBJECTIVE:

VITAL SIGNS:  Blood pressure is 158/48, heart rate 82, respiratory rate 22, pulse ox 100%.

GENERAL:  The patient is sedated, responsive to my verbal stimuli this morning, but yesterday she was
 up and awake and can follow commands.

HEENT:  Slightly pale conjunctivae, anicteric sclerae.

NECK:  No neck mass, no carotid bruits, no JVD.

CHEST:  No deformities.

LUNGS:  Clear breath sounds.  No wheezing, no crackles.

HEART:  Normal sinus rhythm.  No murmur, no gallop, no rub.

ABDOMEN:  Globular, soft.  Positive for PEG.

EXTREMITIES:  No edema.

 

MEDICATIONS:  Of 08/14/2018 was reviewed.

 

LABORATORY DATA:  Of 08/14/2018, white count 8.9, hemoglobin 8.4, sodium 131, potassium 4.7, chloride
 96, carbon dioxide 23, BUN 88, creatinine 4.87, glucose 177, calcium 8.7.

 

ASSESSMENT AND PLAN:

1.  Chronic renal failure - currently on maintenance hemodialysis.  We will schedule for another 4 ho
ur hemodialysis regimen.  Fluid removal as tolerated by the patient.

2.  Anemia, p.r.n. blood transfusion.  Continue weekly Epogen 10,000 units subcutaneously every week.


3.  Acute respiratory failure, unable to wean off the ventilator.  The patient is planned for percuta
neous tracheostomy at bedside today.

4.  Status post cerebrovascular accident with left hemiplegia.  Continue supportive care.

## 2018-08-14 NOTE — OP
DATE OF PROCEDURE:  08/14/2018.

 

PREOPERATIVE DIAGNOSES:

1.  Acute respiratory failure.

2.  Status post cerebrovascular accident.

 

POSTOPERATIVE DIAGNOSES:

1.  Acute respiratory failure.

2.  Status post cerebrovascular accident.

 

PROCEDURES PERFORMED:  Placement of a percutaneous tracheostomy tube.

 

SURGEON:  Shawn Banks D.O.

 

ANESTHESIA:  Deep sedation and local.

 

INDICATIONS FOR PROCEDURE:  A 73-year-old -American woman who is status post cerebrovascular a
ccident.  The patient has developed acute respiratory failure, difficult to wean from mechanical vent
ilator support.  I was asked to place a percutaneous tracheostomy tube to facilitate ventilatory wean
 in anticipation of likely prolonged ventilator support.

 

DESCRIPTION OF PROCEDURE:  Informed consent was obtained from the patient's daughter, power of attorn
olivia.  The patient was placed in spine position.  She was given aliquots of midazolam and fentanyl to a
chieve adequate sedation.  She was then given vecuronium 10 mg intravenously.  Mechanical ventilator 
support was set at full support with FIO2 100%.  The anterior neck is sterilely prepped and draped in
 usual fashion.  Two fingerbreadths above the suprasternal notch was anesthetized in midline using 1%
 lidocaine with epinephrine.  A 1 cm vertical incision is made here using 15 scalpel.  An introducer 
needle was inserted through this incision, advanced through the anterior tracheal wall.  Through this
, a guidewire was passed and advanced into distal tracheal lumen under bronchoscopy.  Needle was with
drawn over the guidewire.  Anterior tracheal wall is sterilely dilated over the guidewire.  A size 8 
tracheostomy tube was then advanced over the guidewire with a stylet.  The tracheostomy tube was left
 in the distal tracheal lumen.  A dilator and guidewire were removed as a unit leaving the tracheosto
my tube in place.  The cuff was inflated and the patient is connected to mechanical ventilator suppor
t via newly placed tracheostomy tube.  Good tidal volume is returned.  Tracheostomy is secured to ant
erior neck using old silk suture at 2 points.  Trach dressings and tie were applied.  Bronchoscope wa
s withdrawn with the endotracheal tube as a unit visualizing the tracheostomy site from above with go
od hemostasis.  The bronchoscope was then reintroduced into the newly placed tracheostomy tube and ad
vanced to visualize the josé luis.  The scope was then withdrawn, visualizing the tracheostomy site from
 below with good hemostasis.  The patient tolerated the operation without any apparent complication a
nd remains hemodynamically stable following completion of the procedure.  Oxygen saturation was 100% 
at all times.

## 2018-08-15 LAB
ANION GAP SERPL CALC-SCNC: 16 MMOL/L (ref 10–20)
BUN SERPL-MCNC: 48 MG/DL (ref 9.8–20.1)
CALCIUM SERPL-MCNC: 8.4 MG/DL (ref 7.8–10.44)
CHLORIDE SERPL-SCNC: 96 MMOL/L (ref 98–107)
CO2 SERPL-SCNC: 25 MMOL/L (ref 23–31)
CREAT CL PREDICTED SERPL C-G-VRATE: 22 ML/MIN (ref 70–130)
GLUCOSE SERPL-MCNC: 145 MG/DL (ref 83–110)
HGB BLD-MCNC: 8.2 G/DL (ref 12–16)
MCH RBC QN AUTO: 31.2 PG (ref 27–31)
MCV RBC AUTO: 90.2 FL (ref 78–98)
MDIFF COMPLETE?: YES
PLATELET # BLD AUTO: 50 THOU/UL (ref 130–400)
PLATELET BLD QL SMEAR: (no result)
POTASSIUM SERPL-SCNC: 4 MMOL/L (ref 3.5–5.1)
RBC # BLD AUTO: 2.64 MILL/UL (ref 4.2–5.4)
SODIUM SERPL-SCNC: 133 MMOL/L (ref 136–145)
WBC # BLD AUTO: 6.7 THOU/UL (ref 4.8–10.8)

## 2018-08-15 RX ADMIN — INSULIN LISPRO PRN UNIT: 100 INJECTION, SOLUTION INTRAVENOUS; SUBCUTANEOUS at 23:22

## 2018-08-15 RX ADMIN — ALBUTEROL SULFATE SCH MG: 2.5 SOLUTION RESPIRATORY (INHALATION) at 07:09

## 2018-08-15 RX ADMIN — INSULIN LISPRO PRN UNIT: 100 INJECTION, SOLUTION INTRAVENOUS; SUBCUTANEOUS at 05:15

## 2018-08-15 RX ADMIN — ALBUTEROL SULFATE SCH MG: 2.5 SOLUTION RESPIRATORY (INHALATION) at 00:33

## 2018-08-15 RX ADMIN — FLUCONAZOLE SCH MLS: 2 INJECTION, SOLUTION INTRAVENOUS at 08:43

## 2018-08-15 RX ADMIN — ALBUTEROL SULFATE SCH MG: 2.5 SOLUTION RESPIRATORY (INHALATION) at 13:35

## 2018-08-15 RX ADMIN — HYDROCORTISONE SCH: 10 CREAM TOPICAL at 20:03

## 2018-08-15 RX ADMIN — Medication SCH ML: at 20:03

## 2018-08-15 RX ADMIN — ALBUTEROL SULFATE SCH MG: 2.5 SOLUTION RESPIRATORY (INHALATION) at 18:36

## 2018-08-15 RX ADMIN — HYDROCORTISONE SCH: 10 CREAM TOPICAL at 14:52

## 2018-08-15 RX ADMIN — INSULIN GLARGINE SCH MLS: 100 INJECTION, SOLUTION SUBCUTANEOUS at 20:04

## 2018-08-15 RX ADMIN — Medication SCH ML: at 08:39

## 2018-08-15 NOTE — PRG
DATE OF SERVICE:  08/15/2018

 

SUBJECTIVE:  Ms. Purdy is a 73-year-old black female with ESRD/chronic renal failure, being follow
ed by [facility] for her maintenance hemodialysis.  She underwent hemodialysis yesterday without a
ny difficulty.  In addition, a percutaneous tracheostomy was done by Dr. Banks.  This morning, no oth
er complaints.  She is arousable and comfortable.

 

PHYSICAL EXAMINATION:

VITAL SIGNS:  Blood pressure is 167/82 with heart rate of 82, respiratory rate 4, pulse ox 100%.

GENERAL:  Awake, can follow simple commands, not in distress.

HEENT:  Slightly pale conjunctivae, anicteric sclerae.

NECK:  No neck mass, no carotid bruits, no JVD.  Positive for tracheostomy.

CHEST:  No deformities.

LUNGS:  Decreased breath sounds.

HEART:  Normal sinus rhythm.  No murmur, no gallops, no rubs.

ABDOMEN:  Globular, soft, nontender, no masses.  Positive for PEG tube.

EXTREMITIES:  No edema, no deformities.

 

MEDICATIONS:  Medications of 08/15/2018 was reviewed.

 

LABORATORY DATA:  Laboratories of 08/15/2018; white count 6.7, hemoglobin 8.2.  Sodium 133, potassium
 4, chloride 96, carbon dioxide 25, BUN 48, creatinine 3, glucose 145, calcium 8.4.

 

ASSESSMENT AND PLAN:

1.  Chronic renal failure/end-stage renal disease, stable.  Continuing 3 times a week hemodialysis re
gimen.  Fluid removal is being tolerated.  Continuing weekly Epogen, p.r.n. blood transfusion.

2.  Acute respiratory failure - patient is extubated and is now with a tracheostomy.  Continue suppor
tive care.

3.  Status post cerebrovascular accident with left hemiplegia.  Continue supportive care.

 

Overall, prognosis remains guarded with this patient.  Patient will probably need LTAC placement.

## 2018-08-15 NOTE — PRG
DATE OF SERVICE:  08/15/2018

 

SUBJECTIVE:  The patient is sitting up in chair and awake.  She has mother and daughter-in-law in the
 room with her.  She herself denies any pain.  She is nonverbal, but shakes her head.  Her daughter-i
n-law requests some suctioning because she has some secretions at the trach itself.

 

OBJECTIVE:

VITAL SIGNS:  Temperature max was 100.9, pulse 82, /48, respirations 16-18, O2 sat 100% on trac
h collar.

GENERAL APPEARANCE:  The patient is sitting up in the chair.  She has a trach in place and a Bennett in
 place.  She is awake and alert, makes eye contact, does shake her head typically to questions that a
re straight forward.

HEENT:  Pupils are reactive.

HEART:  Regular without murmurs.

LUNGS:  Clear to auscultation bilaterally anteriorly.

ABDOMEN:  Soft, slightly protuberant, slightly hypertympanic, but nontender to palpation.  Good bowel
 sounds present.

EXTREMITIES:  Warm and dry without significant edema.

 

LABORATORY DATA:  White count 6.7, hemoglobin 8.2, platelets 50,000.  Sodium 133, potassium 4, chlori
de 96, BUN 48, creatinine is 3.0, glucose 145.

 

IMPRESSION AND PLAN:

1.  Chronic respiratory failure, status post trach placement for failure to wean.  Appears to be stab
le.  Still getting some support at night.  Continue to consider LTAC for further weaning.

2.  Chronic renal failure, on hemodialysis.

3.  Chronic anemia, likely secondary to chronic disease.

4.  Thrombocytopenia of unclear etiology.  This appears to be improving.

5.  Urinary tract infection with yeast.  May account for the low-grade fevers.  She has Diflucan now 
added to her regimen.  We will continue to monitor.  She does not have significant leukocytosis.

6.  The patient has a bit of a distended abdomen and some increased residuals on her PEG feeds.  Regl
an has been added.  We will continue to monitor.

7.  Discussed the situation with the patient's daughter-in-law and her mother.  At this point, she ha
s much happening, but everything appears to be relatively stable, although she does need some continu
ed work and therefore looking at potential LTAC placement.  They are amenable to this.

8.  Hypertension.  We will be changing the patient's IV Lopressor to a PEG dose, which is about 5 mg 
per day higher.  We will continue to monitor.

## 2018-08-15 NOTE — PRG
DATE OF SERVICE:  08/15/2018

 

This is 35 minutes critical care time.

 

The patient underwent percutaneous tracheostomy by Dr. Banks yesterday.  She is awake and alert, tano
cates that she is not having any difficulty this morning.

 

PHYSICAL EXAMINATION:

VITAL SIGNS:  Temperature is 100.5.  She has been running these low-grade temperatures for several da
ys.  Pulse 84, blood pressure 182/60, 24 hour intake 1361, output 237 by urine, 3000 by dialysis.

HEENT:  Pupils react.  Sclerae  anicteric.  Oropharynx clear.

NECK:  Trach in good position.

LUNGS:  Clear anteriorly.

CARDIOVASCULAR:  S1, S2 regular, without murmur.

ABDOMEN:  Soft, nontender.  PEG tube noted.

EXTREMITIES:  No clubbing, cyanosis, but she has diffuse edema throughout.

 

LABORATORY DATA:  White blood cell count 6.7, hemoglobin 8.2, hematocrit 23.8, platelet count 50.  So
dium 133, potassium 4, chloride 96, CO2 of 25, BUN 48, creatinine 3.0, glucose 145.

 

ASSESSMENT:

1.  Acute on chronic respiratory failure requiring mechanical ventilation and now tracheostomy.

2.  Chronic renal failure requiring hemodialysis.

3.  Anemia.

4.  Thrombocytopenia.

5.  Yeast in urine.

 

PLAN:

1.  Add Diflucan for 7 days.

2.  Trach collar trials.

3.  Up in chair as tolerated.

4.  Add low dose Reglan for elevated gastric residuals.

5.  At this point, I would consider LTAC placement.

## 2018-08-16 LAB
ANION GAP SERPL CALC-SCNC: 18 MMOL/L (ref 10–20)
BUN SERPL-MCNC: 73 MG/DL (ref 9.8–20.1)
CALCIUM SERPL-MCNC: 8.4 MG/DL (ref 7.8–10.44)
CHLORIDE SERPL-SCNC: 95 MMOL/L (ref 98–107)
CO2 SERPL-SCNC: 24 MMOL/L (ref 23–31)
CREAT CL PREDICTED SERPL C-G-VRATE: 16 ML/MIN (ref 70–130)
GLUCOSE SERPL-MCNC: 120 MG/DL (ref 83–110)
HGB BLD-MCNC: 8.6 G/DL (ref 12–16)
MCH RBC QN AUTO: 32.1 PG (ref 27–31)
MCV RBC AUTO: 90.2 FL (ref 78–98)
MDIFF COMPLETE?: YES
PLATELET # BLD AUTO: 62 THOU/UL (ref 130–400)
PLATELET BLD QL SMEAR: (no result)
POTASSIUM SERPL-SCNC: 4.2 MMOL/L (ref 3.5–5.1)
RBC # BLD AUTO: 2.67 MILL/UL (ref 4.2–5.4)
SODIUM SERPL-SCNC: 133 MMOL/L (ref 136–145)
WBC # BLD AUTO: 7.1 THOU/UL (ref 4.8–10.8)

## 2018-08-16 RX ADMIN — Medication SCH ML: at 20:51

## 2018-08-16 RX ADMIN — ALBUTEROL SULFATE SCH MG: 2.5 SOLUTION RESPIRATORY (INHALATION) at 00:08

## 2018-08-16 RX ADMIN — ALBUTEROL SULFATE SCH MG: 2.5 SOLUTION RESPIRATORY (INHALATION) at 07:05

## 2018-08-16 RX ADMIN — ALBUTEROL SULFATE SCH MG: 2.5 SOLUTION RESPIRATORY (INHALATION) at 18:09

## 2018-08-16 RX ADMIN — FLUCONAZOLE SCH MLS: 2 INJECTION, SOLUTION INTRAVENOUS at 09:05

## 2018-08-16 RX ADMIN — ALBUTEROL SULFATE SCH MG: 2.5 SOLUTION RESPIRATORY (INHALATION) at 23:21

## 2018-08-16 RX ADMIN — ALBUTEROL SULFATE SCH MG: 2.5 SOLUTION RESPIRATORY (INHALATION) at 12:51

## 2018-08-16 RX ADMIN — HYDROCORTISONE SCH: 10 CREAM TOPICAL at 09:20

## 2018-08-16 RX ADMIN — Medication SCH ML: at 09:05

## 2018-08-16 RX ADMIN — HYDROCORTISONE SCH: 10 CREAM TOPICAL at 20:51

## 2018-08-16 RX ADMIN — INSULIN GLARGINE SCH MLS: 100 INJECTION, SOLUTION SUBCUTANEOUS at 21:10

## 2018-08-16 NOTE — PRG
DATE OF SERVICE:  08/16/2018

 

SERVICE:  Renal Medicine.

 

SUBJECTIVE:  Ms. Purdy is a 73-year-old black female followed by the Renal Service for her mainten
ance hemodialysis.  She has been in dialysis for the last several weeks.  She is tolerating the said 
treatment.  My plan is to do her on a Tuesday, Thursday, and Saturday dialysis using a 4-hour time du
ration.  We are currently using a 3-0 potassium bath.  No acute events noted last night.  In the last
 few days, a percutaneous tracheostomy has been done on the patient.  She is now on a trach collar.  
She is off the ventilator.

 

OBJECTIVE:

VITAL SIGNS:  Blood pressure is 172/54, heart rate is 88, respiratory rate 16, pulse ox 100%.

GENERAL:  Sleepy, less arousable today.

SKIN:  Adequate turgor.

HEENT:  Slightly pale conjunctivae, anicteric sclerae.

NECK:  No neck mass, no carotid bruits, no JVD.  Positive for a tracheostomy.

LUNGS:  Clear breath sounds.  No wheezing, no crackles.

HEART:  Normal sinus rhythm.  No murmur, no gallops or rubs.

ABDOMEN:  Globular, soft, nontender, no masses.  Positive for PEG tube.

EXTREMITIES:  No edema, no deformities.

 

MEDICATIONS:  Of 08/16/2018 was reviewed.

 

LABORATORY DATA:  Of 08/16/2018, white count 7.1, hemoglobin 8.6.  Sodium 133, potassium 4.2, chlorid
e 95, carbon dioxide 24, BUN 73, creatinine 4.19, glucose 120, calcium 8.4.

 

ASSESSMENT AND PLAN:

1.  End-stage renal disease/chronic renal failure - patient will be dialyzed today.  I am at the beds
g[facility] supervising her dialysis.  We will do a 4-hour hemodialysis in attempt between 3 and 3.5 liter of
 fluid removal as tolerated.  We will be using a potassium bath of 3.0.

2.  Status post cerebrovascular accident with left hemiplegia.  Continue supportive care.

3.  Acute respiratory failure, off the ventilator.  Patient currently has a tracheostomy.  Doing well
.  

 

The patient will be subsequently referred for LTAC placement.  Overall, agree with current management
.

## 2018-08-16 NOTE — PRG
DATE OF SERVICE:  08/16/2018

 

The patient has been off the ventilator for 24 hours without much difficulty at all.

 

PHYSICAL EXAMINATION:

VITAL SIGNS:  Temperature 98.8, pulse 79, pressure 164/54, O2 sat 100%.  Total intake 1361, output 73
7.  She had 3000 removed by dialysis yesterday.

HEENT:  Unremarkable.

NECK:  Trach in good position, minimal secretions.

CARDIAC:  S1 and S2 regular.

ABDOMEN:  Soft, nontender.

EXTREMITIES:  No edema.

 

LABORATORY DATA:  White blood cell count 7.1, hematocrit 24.1, platelet count 62.  Sodium 133, potass
ium 4.2, chloride 95, CO2 24, BUN 73, creatinine 4.1, glucose 120.

 

ASSESSMENT:

1.  Acute respiratory failure requiring mechanical ventilation and tracheostomy placement.

2.  Status post stroke.

3.  Yeast in urine.

4.  Thrombocytopenia, which is slightly improved.

5.  Anemia. 

6.  Chronic renal failure requiring hemodialysis.

 

PLAN:

1.  Continue the Diflucan.

2.  Continue trach collar and pulmonary toilet.

3.  Can move to [facility] pending LTAC placement.

## 2018-08-17 LAB
ANION GAP SERPL CALC-SCNC: 14 MMOL/L (ref 10–20)
ANISOCYTOSIS BLD QL SMEAR: (no result) (100X)
BUN SERPL-MCNC: 42 MG/DL (ref 9.8–20.1)
CALCIUM SERPL-MCNC: 8.4 MG/DL (ref 7.8–10.44)
CHLORIDE SERPL-SCNC: 97 MMOL/L (ref 98–107)
CO2 SERPL-SCNC: 28 MMOL/L (ref 23–31)
CREAT CL PREDICTED SERPL C-G-VRATE: 23 ML/MIN (ref 70–130)
GLUCOSE SERPL-MCNC: 209 MG/DL (ref 83–110)
HGB BLD-MCNC: 7.8 G/DL (ref 12–16)
MCH RBC QN AUTO: 31.1 PG (ref 27–31)
MCV RBC AUTO: 89.5 FL (ref 78–98)
MDIFF COMPLETE?: YES
PLATELET # BLD AUTO: 80 THOU/UL (ref 130–400)
PLATELET BLD QL SMEAR: (no result)
POTASSIUM SERPL-SCNC: 3.7 MMOL/L (ref 3.5–5.1)
RBC # BLD AUTO: 2.52 MILL/UL (ref 4.2–5.4)
SODIUM SERPL-SCNC: 135 MMOL/L (ref 136–145)
WBC # BLD AUTO: 7.2 THOU/UL (ref 4.8–10.8)

## 2018-08-17 RX ADMIN — INSULIN GLARGINE SCH MLS: 100 INJECTION, SOLUTION SUBCUTANEOUS at 20:04

## 2018-08-17 RX ADMIN — ALBUTEROL SULFATE SCH MG: 2.5 SOLUTION RESPIRATORY (INHALATION) at 18:24

## 2018-08-17 RX ADMIN — FLUCONAZOLE SCH MLS: 2 INJECTION, SOLUTION INTRAVENOUS at 09:35

## 2018-08-17 RX ADMIN — INSULIN LISPRO PRN UNIT: 100 INJECTION, SOLUTION INTRAVENOUS; SUBCUTANEOUS at 13:57

## 2018-08-17 RX ADMIN — Medication SCH ML: at 20:26

## 2018-08-17 RX ADMIN — INSULIN LISPRO PRN UNIT: 100 INJECTION, SOLUTION INTRAVENOUS; SUBCUTANEOUS at 06:23

## 2018-08-17 RX ADMIN — ALBUTEROL SULFATE SCH MG: 2.5 SOLUTION RESPIRATORY (INHALATION) at 13:45

## 2018-08-17 RX ADMIN — Medication SCH ML: at 09:36

## 2018-08-17 RX ADMIN — INSULIN LISPRO PRN UNIT: 100 INJECTION, SOLUTION INTRAVENOUS; SUBCUTANEOUS at 20:07

## 2018-08-17 RX ADMIN — ALBUTEROL SULFATE SCH MG: 2.5 SOLUTION RESPIRATORY (INHALATION) at 07:54

## 2018-08-17 RX ADMIN — ALBUTEROL SULFATE SCH MG: 2.5 SOLUTION RESPIRATORY (INHALATION) at 23:45

## 2018-08-17 NOTE — PRG
DATE OF SERVICE:  08/16/2018

 

SUBJECTIVE:  Patient is nonverbal.  She is able to indicate she is having no 
pain and no specific complaints.

 

OBJECTIVE:

VITAL SIGNS:  Temperature 98.4, BP is 117/54, pulse nml.

GENITOURINARY:  She has Bennett in place and she has trach in place and PEG tube 
in place.

GENERAL:  Patient is comfortable.  She is in no distress.

HEART:  Regular rate and rhythm.

LUNGS:  Clear.

ABDOMEN:  Soft, nontender, positive bowel sounds.

EXTREMITIES:  Warm and dry, persistent left hemiplegia.

 

LABORATORY DATA:  White count 7.1, hemoglobin 8.6, platelets 62.  Sodium 133, 
potassium 4.2, chloride 95, BUN 73, creatinine 4.19.

 

IMPRESSION AND PLAN:

1.  Chronic respiratory failure, status post trach placement.  She is doing 
well and was able to stay off vent support overnight.

2.  Chronic renal failure, stable on dialysis.

3.  Anemia of chronic disease.

4.  Thrombocytopenia, improving.

5.  Urinary tract infection with use, she is on Diflucan.

6.  Patient had slightly distended abdomen yesterday.  She has had several 
bowel movements on liquid, but now her abdomen appears much more cool.

7.  Hypertension.  Stable with transition over to PEG medications.  We will 
continue to monitor and make adjustments to the dosing as needed.

 

DISPOSITION:  The patient is awaiting [facility].

 

[facility]

## 2018-08-17 NOTE — PDOC.PN
- Subjective


Encounter Start Date: 08/17/18


Encounter Start Time: 12:45





Non-verbal.





- Objective


Resuscitation Status: 


 











Resuscitation Status           DNR:Do Not Resuscitate














Vital Signs & Weight: 


 Vital Signs (12 hours)











  Temp Pulse Pulse Pulse Resp BP BP


 


 08/17/18 20:05   101 H     207/62 H 


 


 08/17/18 20:00  98.0 F  101 H    20  


 


 08/17/18 19:00  98.0 F  99    20  


 


 08/17/18 18:24   97    20  


 


 08/17/18 16:49  97.7 F  98    20  


 


 08/17/18 14:45    91  91    168/54 H


 


 08/17/18 13:45   88    19  














  BP BP Pulse Ox


 


 08/17/18 20:05   


 


 08/17/18 20:00    99


 


 08/17/18 19:00   204/63 H  99


 


 08/17/18 18:24   


 


 08/17/18 16:49   168/54 H  99


 


 08/17/18 14:45  187/57 H  


 


 08/17/18 13:45   








 Weight











Admit Weight                   209 lb 7.026 oz


 


Weight                         182 lb 8.684 oz











 Most Recent Monitor Data











Heart Rate from ECG            85


 


NIBP                           166/60


 


NIBP BP-Mean                   104


 


Respiration from ECG           16


 


SpO2                           100














I&O: 


 











 08/16/18 08/17/18 08/18/18





 06:59 06:59 06:59


 


Intake Total 958 1375 300


 


Output Total 141 3834 60


 


Balance 817 -2459 240











Result Diagrams: 


 08/17/18 05:00





 08/17/18 05:00


Additional Labs: 


 Accuchecks











  08/17/18 08/17/18 08/17/18





  16:31 10:34 05:49


 


POC Glucose  192 H  185 H  203 H














  08/17/18 08/17/18





  04:29 00:19


 


POC Glucose  185 H  200 H














Phys Exam





- Physical Examination


Constitutional: NAD


Respiratory: no wheezing, no rales, no rhonchi


Cardiovascular: RRR, no significant murmur


Gastrointestinal: soft, non-tender, no distention


PEG in place


Left hemiplegia.  Non-verbal.  Does answer with nods.





Dx/Plan


(1) Acute respiratory failure


Code(s): J96.00 - ACUTE RESPIRATORY FAILURE, UNSP W HYPOXIA OR HYPERCAPNIA   

Status: Acute   


Qualifiers: 


   Respiratory failure complication: hypoxia   Qualified Code(s): J96.01 - 

Acute respiratory failure with hypoxia   


Comment: on ventilator, pulm managing.  Not weaning.  trach Tuesday   





(2) Dysphagia


Code(s): R13.10 - DYSPHAGIA, UNSPECIFIED   Status: Acute   


Qualifiers: 


   Dysphagia type: unspecified   Qualified Code(s): R13.10 - Dysphagia, 

unspecified   


Comment: s/p peg   





(3) Hemiplegia affecting left nondominant side


Code(s): G81.94 - HEMIPLEGIA, UNSPECIFIED AFFECTING LEFT NONDOMINANT SIDE   

Status: Chronic   


Qualifiers: 


   Hemiplegia type: flaccid 





- Plan





* Awaiting placement.

## 2018-08-17 NOTE — PRG
DATE OF SERVICE:  08/17/2018

 

SUBJECTIVE:  The patient was transferred to the Intermediate Care Unit yesterday.  She has done well.
  She continues on trach collar.

 

PHYSICAL EXAMINATION: 

VITAL SIGNS:  Temperature is 98.8, pulse 87, respiratory rate 17, O2 sat 99%, blood pressure 173/60.

HEENT:  Unremarkable.

NECK:  No JVD.  Trach in good position, minimal secretions.

CARDIAC:  S1 and S2 regular.

ABDOMEN:  Soft, nontender.

EXTREMITIES:  No clubbing, cyanosis, or edema.

 

LABORATORY DATA:  Sodium 135, potassium 3.7, chloride 97, CO2 28, BUN 42, creatinine 2.8, glucose 209
.  White blood cell count 7.2, hematocrit 22.5, platelet count 80.

 

ASSESSMENT:  

1.  Yeast in urine.  

2.  Acute respiratory failure requiring mechanical ventilation and tracheostomy placement and ____, s
uccessfully weaned:

3.  Status post left middle cerebral artery distribution stroke.

4.  Thrombocytopenia, which appears to be improved after elimination of her cephalosporins and treatm
ent of her yeast with Candida.

5.  Generalized failure to thrive.

 

PLAN:

1.  This is mainly a placement issue at this point.  She will need nursing home capable of taking car
e of tracheostomy and transporting her for dialysis.  An LTAC may be required in the interim.  2.  Co
ntinue sliding scale insulin for elevated blood sugars.

3.  Continue Diflucan and finish a total of 7 days.

4.  I will stop her daily labs.  They can probably be rechecked on Monday.

## 2018-08-18 RX ADMIN — CLONIDINE SCH MG: 0.3 PATCH TRANSDERMAL at 20:30

## 2018-08-18 RX ADMIN — INSULIN LISPRO PRN UNIT: 100 INJECTION, SOLUTION INTRAVENOUS; SUBCUTANEOUS at 11:21

## 2018-08-18 RX ADMIN — INSULIN LISPRO PRN UNIT: 100 INJECTION, SOLUTION INTRAVENOUS; SUBCUTANEOUS at 00:04

## 2018-08-18 RX ADMIN — INSULIN LISPRO PRN UNIT: 100 INJECTION, SOLUTION INTRAVENOUS; SUBCUTANEOUS at 16:50

## 2018-08-18 RX ADMIN — Medication SCH ML: at 08:34

## 2018-08-18 RX ADMIN — INSULIN GLARGINE SCH MLS: 100 INJECTION, SOLUTION SUBCUTANEOUS at 20:32

## 2018-08-18 RX ADMIN — ALBUTEROL SULFATE SCH MG: 2.5 SOLUTION RESPIRATORY (INHALATION) at 07:33

## 2018-08-18 RX ADMIN — ALBUTEROL SULFATE SCH MG: 2.5 SOLUTION RESPIRATORY (INHALATION) at 12:38

## 2018-08-18 RX ADMIN — Medication SCH ML: at 20:32

## 2018-08-18 RX ADMIN — ALBUTEROL SULFATE SCH MG: 2.5 SOLUTION RESPIRATORY (INHALATION) at 18:43

## 2018-08-18 RX ADMIN — FLUCONAZOLE SCH MLS: 2 INJECTION, SOLUTION INTRAVENOUS at 08:32

## 2018-08-18 NOTE — PRG
DATE OF SERVICE:  08/18/2018

 

SUBJECTIVE:  Ms. Purdy is a 73-year-old black female who is status post CVA with left hemiplegia a
nd being followed by Renal Service for her chronic renal failure.  She is currently on maintenance he
modialysis.  I am at the bedside supervising her dialysis.  Attempting about 3-liter fluid removal as
 tolerated by the patient.  No new acute events.

 

OBJECTIVE:

VITAL SIGNS:  Blood pressure is noted at 132/57 with a heart rate of 94, respiratory rate 18, tempera
ture 99.2, pulse oximetry 99%.

GENERAL:  Awake, alert, comfortable.  Can follow simple commands.

HEENT:  Slightly pale conjunctivae, anicteric sclerae.

NECK:  No neck mass, no carotid bruits, no JVD.  Positive for tracheostomy.

LUNGS:  Decreased breath sounds.

HEART:  Normal sinus rhythm.  No murmur, no gallops, no rubs.

ABDOMEN:  Globular, soft, nontender, no masses.

EXTREMITIES:  No edema, no deformities.  Positive for a PEG tube in the belly.

 

MEDICATIONS:  Medications of 08/17/2018 was reviewed.

 

LABORATORY DATA:   Labs of 08/17/2018, white count 7.2, hemoglobin 7.8; 08/17/2018, sodium 135, potas
sium 3.7, chloride 97, carbon dioxide 28, BUN 42, creatinine 2.85, glucose 209, calcium 8.4.

 

ASSESSMENT AND PLAN:

1.  Chronic renal failure -- currently on maintenance hemodialysis.  No evidence of renal recovery, c
ontinue 3 times a week hemodialysis, three-liter fluid removal as tolerated by the patient.  We will 
do a 4-hour hemodialysis today.

2.  Anemia, weekly Epogen, p.r.n. blood transfusion.

3.  Status post cerebrovascular accident with left hemiplegia.  Continue supportive care.  Awaiting L
TAC placement.

 

Agree with current management.

## 2018-08-18 NOTE — PRG
DATE OF SERVICE:  08/18/2018

 

SERVICE:  Pulmonary Medicine.

 

INTERVAL HISTORY:  The patient is doing fine from a respiratory standpoint.  She remains in static en
cephalopathy.  She cannot really provide any elements of the history.  She appears to be comfortable.
  Nursing reports no events overnight.

 

PHYSICAL EXAMINATION:

VITAL SIGNS:  Afebrile, pulse 87, blood pressure 178/57, respirations 20, and saturation 99% on room 
air.

GENERAL:  The patient is awake, alert, in no apparent distress.

LUNGS:  Decent air entry.  There is rhonchi present, which are extensive.  There is no prolonged expi
ratory phase or wheezing.

HEART:  Normal rate, regular.

ABDOMEN:  Soft, nontender, nondistended.  Bowel sounds are positive.

MUSCULOSKELETAL:  No cyanosis or clubbing.  There is no pitting in the bilateral lower extremities.

NEUROLOGIC:  Grossly nonfocal.

 

LABORATORY DATA:  WBC 7.2, hemoglobin 7.8, platelets 80,000, and continuing to trend upwards.  INR 1.
2.  Blood sugars ranged from 185-203.  Platelet factor 4 was unremarkable.  All culture results remai
n negative except for urine culture growing yeast species.

 

ASSESSMENT:

1.  Urinary tract infection secondary to yeast.

2.  Thrombocytopenia, resolving following discontinuation of cefepime, and initiation of treatment fo
r yeast urinary tract infection.

3.  Middle cerebral artery distribution, cerebrovascular accident.

4.  Deconditioning and debility, severe, status post tracheostomy and PEG tube placement.

 

DISCUSSION AND PLAN:  The patient is stable for transition out of the hospital.  Diflucan will be dis
continued after total duration of 7 days.  I will provide the patient with a lab holiday starting logan
orrow morning.  Pulmonary Critical Care will continue to follow.

## 2018-08-18 NOTE — RAD
AP VIEW CHEST:

 

Date:  08/18/18

 

HISTORY:  

Chronic respiratory failure. 

 

FINDINGS:

 

Comparison made to previous exam from 08/10/18. 

 

AP view of chest demonstrates a right jugular dialysis catheter in place. Tracheostomy tube is in yasmeen
ce. 

 

Mild cardiomegaly and pulmonary vascular congestion seen. No evidence of effusions seen. 

 

IMPRESSION: 

Cardiomegaly and mild pulmonary vascular congestion. No other acute intrathoracic abnormality seen. 

 

 

POS: [facility]

## 2018-08-19 LAB
BACTERIA UR QL AUTO: (no result) HPF
CRYSTALS URNS QL MICRO: (no result) HPF
HYALINE CASTS #/AREA URNS LPF: (no result) LPF
PROT UR STRIP.AUTO-MCNC: (no result) MG/DL
RBC UR QL AUTO: (no result) HPF (ref 0–3)
SP GR UR STRIP: 1.02 (ref 1–1.03)
WBC UR QL AUTO: (no result) HPF (ref 0–3)
YEAST FLD HPF-#/AREA: (no result) HPF

## 2018-08-19 RX ADMIN — ALBUTEROL SULFATE SCH MG: 2.5 SOLUTION RESPIRATORY (INHALATION) at 18:47

## 2018-08-19 RX ADMIN — ERYTHROPOIETIN SCH UNITS: 20000 INJECTION, SOLUTION INTRAVENOUS; SUBCUTANEOUS at 14:19

## 2018-08-19 RX ADMIN — INSULIN GLARGINE SCH MLS: 100 INJECTION, SOLUTION SUBCUTANEOUS at 21:05

## 2018-08-19 RX ADMIN — Medication SCH ML: at 07:44

## 2018-08-19 RX ADMIN — INSULIN LISPRO PRN UNIT: 100 INJECTION, SOLUTION INTRAVENOUS; SUBCUTANEOUS at 11:58

## 2018-08-19 RX ADMIN — Medication SCH ML: at 21:04

## 2018-08-19 RX ADMIN — FLUCONAZOLE SCH MLS: 2 INJECTION, SOLUTION INTRAVENOUS at 09:25

## 2018-08-19 RX ADMIN — ALBUTEROL SULFATE SCH MG: 2.5 SOLUTION RESPIRATORY (INHALATION) at 13:02

## 2018-08-19 RX ADMIN — INSULIN LISPRO PRN UNIT: 100 INJECTION, SOLUTION INTRAVENOUS; SUBCUTANEOUS at 02:40

## 2018-08-19 RX ADMIN — INSULIN LISPRO PRN UNIT: 100 INJECTION, SOLUTION INTRAVENOUS; SUBCUTANEOUS at 06:25

## 2018-08-19 RX ADMIN — ALBUTEROL SULFATE SCH MG: 2.5 SOLUTION RESPIRATORY (INHALATION) at 06:13

## 2018-08-19 RX ADMIN — ALBUTEROL SULFATE SCH MG: 2.5 SOLUTION RESPIRATORY (INHALATION) at 02:42

## 2018-08-19 RX ADMIN — INSULIN LISPRO PRN UNIT: 100 INJECTION, SOLUTION INTRAVENOUS; SUBCUTANEOUS at 17:50

## 2018-08-19 NOTE — PRG
DATE OF SERVICE:  08/19/2018

 

RENAL MEDICINE

 

SUBJECTIVE:  Ms. Purdy is a 73-year-old black female with ESRD/chronic renal failure - status post
 cerebrovascular accident with left hemiplegia and being followed by the Renal Service for her mainte
nance hemodialysis.  She underwent hemodialysis yesterday.  She tolerated the said fluid removal.  Ou
r plan is again to continue her Tuesday, Thursday, and Saturday dialysis regimen.

 

No acute events noted.

 

OBJECTIVE:

VITAL SIGNS:  Blood pressure is 165/61, heart rate 90, respiratory rate 22, temperature 101.2, and O2
 sat 99%.

GENERAL:  Sleepy, but arousable, comfortable, not in distress.

SKIN:  Adequate turgor.

HEENT:  Slightly pale conjunctivae, anicteric sclerae.

NECK:  No neck mass, no carotid bruits, no JVD.

CHEST:  No deformities.

LUNGS:  Decreased breath sounds.

HEART:  Normal sinus rhythm.  No murmur, no gallops, no rubs.

ABDOMEN:  Globular, soft, nontender.  Positive for PEG.

EXTREMITIES:  No edema, no deformities.

 

MEDICATIONS:  Of 08/19/2018, was reviewed.

 

LABORATORY DATA:  Of 08/17/2018, white count 7.2, hemoglobin 7.8.  08/19/2018, glucose 180.  08/17/20
18, sodium 135, potassium 3.7, chloride 97, carbon dioxide 28, BUN 42, creatinine 2.85, glucose 209, 
calcium 8.4.

 

ASSESSMENT AND PLAN:

1.  Chronic renal failure/end-stage renal disease - stable.  We will continue current - Tuesday, Thur
sday, and Saturday dialysis regimen.  Again, fluid removal only as tolerated.  No evidence of renal r
ecovery.  We will check base met tomorrow.

2.  Anemia, on weekly Epogen.  Recheck CBC again.

3.  Fever - the patient currently on an antifungal regimen.

4.  We will agree with current management.

## 2018-08-19 NOTE — PRG
DATE OF SERVICE:  08/19/2018

 

SERVICE:  Pulmonary Medicine.

 

INTERVAL HISTORY:  The patient is doing fine from a respiratory standpoint.  
There has been no interval change to his condition.  He is not able to provide 
any elements of the history.  Otherwise, we are awaiting on placement.  Nursing 
reports no overnight events.

 

PHYSICAL EXAMINATION:

VITAL SIGNS:  Currently, febrile with a T-max of 101.2, pulse 90, blood 
pressure 165/61, respirations 22, saturation 99% on 5 liter via T-collar.

HEENT:  Normocephalic, atraumatic.  Sclerae are white, conjunctivae pink.  Oral 
and nasal mucosa moist without lesions.  Tracheostomy is in place.

LUNGS:  Extensive rhonchi are present throughout bilateral lung fields.  No 
wheezing or crackles are appreciated.

HEART:  Normal rate, regular.

ABDOMEN:  Soft, nontender, nondistended.  Bowel sounds are positive.

MUSCULOSKELETAL:  No cyanosis or clubbing.  There is no pitting in the 
bilateral lower extremities.

NEUROLOGIC:  Grossly nonfocal.

 

ASSESSMENT:

1.  Urinary tract infection, secondary to yeast.

2.  Thrombocytopenia, previously resolving after discontinuation of cefepime 
and initiation of treatment for yeast.

3.  Middle cerebral artery cerebrovascular accident.

4.  Deconditioning and debility, severe, status post tracheostomy and PEG tube 
placement.

5.  Sepsis, recrudesce.  

 

DISCUSSION AND PLAN:  We will pan culture the patient once again.  We will 
continue our empiric antibiotics for the time being.  Return to broad-spectrum 
antibiotics will be directed by culture results.  Pulmonary Critical Care will 
continue to follow while the patient remains in the IMCU.  We should probably 
hold transfer until Tuesday or Wednesday of this week until the patient's new 
sepsis profile starts to settle back down once again.  I will repeat 
laboratories tomorrow morning.

 

HAYLEE

## 2018-08-19 NOTE — PDOC.PN
- Subjective


Encounter Start Date: 08/19/18


Encounter Start Time: 09:05


-: non-verbal





- Objective


Resuscitation Status: 


 











Resuscitation Status           DNR:Do Not Resuscitate














Vital Signs & Weight: 


 Vital Signs (12 hours)











  Temp Pulse Pulse Pulse Resp BP BP


 


 08/19/18 13:02   82    18  


 


 08/19/18 11:00  99.5 F  84    24 H  


 


 08/19/18 08:56   91     165/61 H 


 


 08/19/18 08:50    88  90    186/52 H


 


 08/19/18 08:00  101.2 F H  90    22 H  


 


 08/19/18 07:00  101.2 F H  90    22 H  


 


 08/19/18 06:13   92    16  


 


 08/19/18 04:00  98.6 F  90    20  


 


 08/19/18 02:42   91    20  














  BP BP Pulse Ox Pulse Ox Pulse Ox


 


 08/19/18 13:02    100  


 


 08/19/18 11:00   172/56 H  100  


 


 08/19/18 08:56     


 


 08/19/18 08:50  165/61 H    99  98


 


 08/19/18 08:00    99  


 


 08/19/18 07:00   170/52 H  99  


 


 08/19/18 06:13    97  


 


 08/19/18 04:00   145/45 H  99  


 


 08/19/18 02:42    99  








 Weight











Admit Weight                   209 lb 7.026 oz


 


Weight                         182 lb 8.684 oz











 Most Recent Monitor Data











Heart Rate from ECG            85


 


NIBP                           166/60


 


NIBP BP-Mean                   104


 


Respiration from ECG           16


 


SpO2                           100














I&O: 


 











 08/18/18 08/19/18 08/20/18





 06:59 06:59 06:59


 


Intake Total 910 630 160


 


Output Total 80 100 30


 


Balance 830 530 130











Result Diagrams: 


 08/17/18 05:00





 08/17/18 05:00


Additional Labs: 


 Accuchecks











  08/19/18 08/19/18 08/19/18





  11:57 06:06 01:17


 


POC Glucose  197 H  180 H  227 H














  08/18/18





  16:43


 


POC Glucose  197 H














Phys Exam





- Physical Examination


Constitutional: NAD


Respiratory: no wheezing, no rales, no rhonchi, clear to auscultation bilateral


Cardiovascular: RRR, no significant murmur


Gastrointestinal: soft, non-tender, no distention


Musculoskeletal: no edema





Dx/Plan


(1) Acute respiratory failure


Code(s): J96.00 - ACUTE RESPIRATORY FAILURE, UNSP W HYPOXIA OR HYPERCAPNIA   

Status: Acute   


Qualifiers: 


   Respiratory failure complication: hypoxia   Qualified Code(s): J96.01 - 

Acute respiratory failure with hypoxia   


Comment: Trach in place.  Maturing well.   





(2) Dysphagia


Code(s): R13.10 - DYSPHAGIA, UNSPECIFIED   Status: Acute   


Qualifiers: 


   Dysphagia type: unspecified   Qualified Code(s): R13.10 - Dysphagia, 

unspecified   


Comment: s/p peg   





(3) Hemiplegia affecting left nondominant side


Code(s): G81.94 - HEMIPLEGIA, UNSPECIFIED AFFECTING LEFT NONDOMINANT SIDE   

Status: Chronic   


Qualifiers: 


   Hemiplegia type: flaccid 


Comment: Awaiting placement.     





(4) Candidal UTI (urinary tract infection)


Code(s): B37.49 - OTHER UROGENITAL CANDIDIASIS   Status: Acute   Comment: On 

Diflucan   





(5) Fever


Code(s): R50.9 - FEVER, UNSPECIFIED   Status: Acute   Comment: CXR was negative 

yesterday.  Will get blood cultures if it occurs again.  Check UA and DC Bennett.

   





- Plan





* Stable.  Consider removing Bennett.  Very oliguric.


* Awaiting placement.

## 2018-08-20 LAB
ANION GAP SERPL CALC-SCNC: 15 MMOL/L (ref 10–20)
BASOPHILS # BLD AUTO: 0.1 THOU/UL (ref 0–0.2)
BASOPHILS NFR BLD AUTO: 0.8 % (ref 0–1)
BUN SERPL-MCNC: 56 MG/DL (ref 9.8–20.1)
CALCIUM SERPL-MCNC: 8.9 MG/DL (ref 7.8–10.44)
CHLORIDE SERPL-SCNC: 95 MMOL/L (ref 98–107)
CO2 SERPL-SCNC: 27 MMOL/L (ref 23–31)
CREAT CL PREDICTED SERPL C-G-VRATE: 15 ML/MIN (ref 70–130)
EOSINOPHIL # BLD AUTO: 0.1 THOU/UL (ref 0–0.7)
EOSINOPHIL NFR BLD AUTO: 1.2 % (ref 0–10)
GLUCOSE SERPL-MCNC: 262 MG/DL (ref 83–110)
HGB BLD-MCNC: 7.8 G/DL (ref 12–16)
LYMPHOCYTES # BLD: 1.8 THOU/UL (ref 1.2–3.4)
LYMPHOCYTES NFR BLD AUTO: 22.2 % (ref 21–51)
MAGNESIUM SERPL-MCNC: 1.9 MG/DL (ref 1.6–2.6)
MCH RBC QN AUTO: 30.6 PG (ref 27–31)
MCV RBC AUTO: 89.7 FL (ref 78–98)
MONOCYTES # BLD AUTO: 0.6 THOU/UL (ref 0.11–0.59)
MONOCYTES NFR BLD AUTO: 7.7 % (ref 0–10)
NEUTROPHILS # BLD AUTO: 5.5 THOU/UL (ref 1.4–6.5)
NEUTROPHILS NFR BLD AUTO: 68.1 % (ref 42–75)
PLATELET # BLD AUTO: 97 THOU/UL (ref 130–400)
POTASSIUM SERPL-SCNC: 4 MMOL/L (ref 3.5–5.1)
RBC # BLD AUTO: 2.56 MILL/UL (ref 4.2–5.4)
SODIUM SERPL-SCNC: 133 MMOL/L (ref 136–145)
WBC # BLD AUTO: 8.1 THOU/UL (ref 4.8–10.8)

## 2018-08-20 RX ADMIN — Medication SCH ML: at 08:58

## 2018-08-20 RX ADMIN — INSULIN LISPRO PRN UNIT: 100 INJECTION, SOLUTION INTRAVENOUS; SUBCUTANEOUS at 18:18

## 2018-08-20 RX ADMIN — ALBUTEROL SULFATE SCH MG: 2.5 SOLUTION RESPIRATORY (INHALATION) at 01:04

## 2018-08-20 RX ADMIN — INSULIN LISPRO PRN UNIT: 100 INJECTION, SOLUTION INTRAVENOUS; SUBCUTANEOUS at 12:26

## 2018-08-20 RX ADMIN — INSULIN LISPRO PRN UNIT: 100 INJECTION, SOLUTION INTRAVENOUS; SUBCUTANEOUS at 00:43

## 2018-08-20 RX ADMIN — INSULIN LISPRO PRN UNIT: 100 INJECTION, SOLUTION INTRAVENOUS; SUBCUTANEOUS at 06:09

## 2018-08-20 RX ADMIN — ALBUTEROL SULFATE SCH MG: 2.5 SOLUTION RESPIRATORY (INHALATION) at 14:00

## 2018-08-20 RX ADMIN — FLUCONAZOLE SCH MLS: 2 INJECTION, SOLUTION INTRAVENOUS at 08:45

## 2018-08-20 RX ADMIN — ALBUTEROL SULFATE SCH MG: 2.5 SOLUTION RESPIRATORY (INHALATION) at 08:01

## 2018-08-20 RX ADMIN — INSULIN GLARGINE SCH MLS: 100 INJECTION, SOLUTION SUBCUTANEOUS at 20:46

## 2018-08-20 RX ADMIN — ALBUTEROL SULFATE SCH MG: 2.5 SOLUTION RESPIRATORY (INHALATION) at 19:27

## 2018-08-20 RX ADMIN — Medication SCH ML: at 20:46

## 2018-08-20 NOTE — PDOC.PN
- Subjective


Encounter Start Date: 08/20/18


Encounter Start Time: 14:17


Subjective: awake, no appropriatee responce





- Objective


Resuscitation Status: 


 











Resuscitation Status           DNR:Do Not Resuscitate














MAR Reviewed: Yes


Vital Signs & Weight: 


 Vital Signs (12 hours)











  Temp Pulse Pulse Pulse Resp BP BP


 


 08/20/18 14:00   87    20  


 


 08/20/18 11:50  98.2 F  83    20  


 


 08/20/18 10:40       


 


 08/20/18 10:35    81  80    175/62 H


 


 08/20/18 08:55  99.4 F  92    20  


 


 08/20/18 08:45   92     175/82 H 


 


 08/20/18 08:42   92     175/82 H 


 


 08/20/18 08:03       


 


 08/20/18 08:01   89    20  


 


 08/20/18 07:44  99.4 F  89    28 H  


 


 08/20/18 06:07   94     


 


 08/20/18 04:44  97.4 F L  91    25 H  














  BP BP Pulse Ox Pulse Ox Pulse Ox


 


 08/20/18 14:00    99  


 


 08/20/18 11:50   173/55 H  100  


 


 08/20/18 10:40   160/54 H   


 


 08/20/18 10:35  160/54 H    100  100


 


 08/20/18 08:55    99  


 


 08/20/18 08:45     


 


 08/20/18 08:42     


 


 08/20/18 08:03    99  


 


 08/20/18 08:01    99  


 


 08/20/18 07:44   172/54 H  100  


 


 08/20/18 06:07   186/56 H   


 


 08/20/18 04:44   173/51 H  100  








 Weight











Admit Weight                   209 lb 7.026 oz


 


Weight                         182 lb 8.684 oz











 Most Recent Monitor Data











Heart Rate from ECG            85


 


NIBP                           166/60


 


NIBP BP-Mean                   104


 


Respiration from ECG           16


 


SpO2                           100














I&O: 


 











 08/19/18 08/20/18 08/21/18





 06:59 06:59 06:59


 


Intake Total 630 1038 100


 


Output Total 100 30 


 


Balance 530 1008 100











Result Diagrams: 


 08/20/18 08:53





 08/20/18 08:53


Additional Labs: 


 Accuchecks











  08/20/18 08/20/18 08/20/18





  12:25 05:50 00:32


 


POC Glucose  284 H  249 H  251 H














  08/19/18





  17:49


 


POC Glucose  222 H














Phys Exam





- Physical Examination


Neck: no JVD


Respiratory: clear to auscultation bilateral


Cardiovascular: RRR, no significant murmur


Gastrointestinal: soft, positive bowel sounds


Musculoskeletal: edema present


L hemiplegia





Dx/Plan


(1) Acute respiratory failure


Code(s): J96.00 - ACUTE RESPIRATORY FAILURE, UNSP W HYPOXIA OR HYPERCAPNIA   

Status: Acute   


Qualifiers: 


   Respiratory failure complication: hypoxia   Qualified Code(s): J96.01 - 

Acute respiratory failure with hypoxia   


Comment: Trach in place.  Maturing well.   





(2) Candidal UTI (urinary tract infection)


Code(s): B37.49 - OTHER UROGENITAL CANDIDIASIS   Status: Acute   Comment: On 

Diflucan   





(3) CKD (chronic kidney disease) stage 5, GFR less than 15 ml/min


Code(s): N18.5 - CHRONIC KIDNEY DISEASE, STAGE 5   Status: Chronic   Comment: 

on HD at present   





(4) Hemiplegia affecting left nondominant side


Code(s): G81.94 - HEMIPLEGIA, UNSPECIFIED AFFECTING LEFT NONDOMINANT SIDE   

Status: Chronic   


Qualifiers: 


   Hemiplegia type: flaccid 


Comment: Awaiting placement.     





(5) Hyperlipidemia


Code(s): E78.5 - HYPERLIPIDEMIA, UNSPECIFIED   Status: Chronic   


Qualifiers: 


   Hyperlipidemia type: unspecified   Qualified Code(s): E78.5 - Hyperlipidemia

, unspecified   





(6) Hypertension


Code(s): I10 - ESSENTIAL (PRIMARY) HYPERTENSION   Status: Chronic   


Qualifiers: 


   Hypertension type: essential hypertension   Qualified Code(s): I10 - 

Essential (primary) hypertension   





(7) Acute encephalopathy


Code(s): G93.40 - ENCEPHALOPATHY, UNSPECIFIED   Status: Resolved   Comment: sec 

to massive cva - stable   





- Plan


on vent, non-weanable


-: nutrition, meds per PEG


-: LTAC referral





* .

## 2018-08-20 NOTE — PRG
DATE OF SERVICE:  08/20/2018

 

Ms. Purdy is lying in bed, does not look much different than she has looked the past week.

 

PHYSICAL EXAMINATION:

VITAL SIGNS:  Temperature is 99.4, pulse 89, respirations 20, O2 sat 100%, blood pressure 172/54.

HEENT:  Remarkable for bitemporal wasting.

NECK:  Trach in good position, minimal secretions.

CARDIAC:  S1 and S2 regular, without murmur.

LUNGS:  Clear without wheezing or rhonchi.

ABDOMEN:  Soft, nontender.

EXTREMITIES:  Generalized edema throughout.

 

LABORATORY DATA:  No new labs were done today.

 

ASSESSMENT:  

1.  She had fever yesterday morning, she was recultured and we are waiting for full results from that
.

2.  Status post tracheostomy and PEG tube placement for stroke and aspiration.

 

PLAN:  

1.  Trend CBC tomorrow.    

2.  Continue Diflucan for UTI until she has completed 7 days.

3.  Proceed with LTAC placement whenever possible.

## 2018-08-21 LAB
ANION GAP SERPL CALC-SCNC: 15 MMOL/L (ref 10–20)
BASOPHILS # BLD AUTO: 0 THOU/UL (ref 0–0.2)
BASOPHILS NFR BLD AUTO: 0.2 % (ref 0–1)
BUN SERPL-MCNC: 71 MG/DL (ref 9.8–20.1)
CALCIUM SERPL-MCNC: 9.1 MG/DL (ref 7.8–10.44)
CHLORIDE SERPL-SCNC: 94 MMOL/L (ref 98–107)
CO2 SERPL-SCNC: 26 MMOL/L (ref 23–31)
CREAT CL PREDICTED SERPL C-G-VRATE: 12 ML/MIN (ref 70–130)
EOSINOPHIL # BLD AUTO: 0.2 THOU/UL (ref 0–0.7)
EOSINOPHIL NFR BLD AUTO: 1.7 % (ref 0–10)
GLUCOSE SERPL-MCNC: 264 MG/DL (ref 83–110)
HGB BLD-MCNC: 7.8 G/DL (ref 12–16)
LYMPHOCYTES # BLD: 1.6 THOU/UL (ref 1.2–3.4)
LYMPHOCYTES NFR BLD AUTO: 18.2 % (ref 21–51)
MCH RBC QN AUTO: 31.6 PG (ref 27–31)
MCV RBC AUTO: 89 FL (ref 78–98)
MONOCYTES # BLD AUTO: 0.7 THOU/UL (ref 0.11–0.59)
MONOCYTES NFR BLD AUTO: 7.6 % (ref 0–10)
NEUTROPHILS # BLD AUTO: 6.4 THOU/UL (ref 1.4–6.5)
NEUTROPHILS NFR BLD AUTO: 72.2 % (ref 42–75)
PLATELET # BLD AUTO: 105 THOU/UL (ref 130–400)
POTASSIUM SERPL-SCNC: 4.1 MMOL/L (ref 3.5–5.1)
RBC # BLD AUTO: 2.48 MILL/UL (ref 4.2–5.4)
SODIUM SERPL-SCNC: 131 MMOL/L (ref 136–145)
WBC # BLD AUTO: 8.9 THOU/UL (ref 4.8–10.8)

## 2018-08-21 RX ADMIN — INSULIN GLARGINE SCH MLS: 100 INJECTION, SOLUTION SUBCUTANEOUS at 20:57

## 2018-08-21 RX ADMIN — FLUCONAZOLE SCH: 2 INJECTION, SOLUTION INTRAVENOUS at 12:35

## 2018-08-21 RX ADMIN — FLUCONAZOLE SCH MLS: 2 INJECTION, SOLUTION INTRAVENOUS at 14:07

## 2018-08-21 RX ADMIN — ALBUTEROL SULFATE SCH MG: 2.5 SOLUTION RESPIRATORY (INHALATION) at 07:16

## 2018-08-21 RX ADMIN — INSULIN LISPRO PRN UNIT: 100 INJECTION, SOLUTION INTRAVENOUS; SUBCUTANEOUS at 16:31

## 2018-08-21 RX ADMIN — Medication SCH ML: at 12:20

## 2018-08-21 RX ADMIN — Medication SCH ML: at 20:42

## 2018-08-21 RX ADMIN — ALBUTEROL SULFATE SCH MG: 2.5 SOLUTION RESPIRATORY (INHALATION) at 14:19

## 2018-08-21 RX ADMIN — INSULIN LISPRO PRN UNIT: 100 INJECTION, SOLUTION INTRAVENOUS; SUBCUTANEOUS at 06:13

## 2018-08-21 RX ADMIN — ALBUTEROL SULFATE SCH MG: 2.5 SOLUTION RESPIRATORY (INHALATION) at 18:56

## 2018-08-21 RX ADMIN — INSULIN LISPRO PRN UNIT: 100 INJECTION, SOLUTION INTRAVENOUS; SUBCUTANEOUS at 00:32

## 2018-08-21 RX ADMIN — ALBUTEROL SULFATE SCH MG: 2.5 SOLUTION RESPIRATORY (INHALATION) at 00:10

## 2018-08-21 NOTE — PDOC.PN
- Subjective


Encounter Start Date: 08/21/18


Encounter Start Time: 07:43


Subjective: non-verbal, nods to verbal stimuli





- Objective


Resuscitation Status: 


 











Resuscitation Status           DNR:Do Not Resuscitate














MAR Reviewed: Yes


Vital Signs & Weight: 


 Vital Signs (12 hours)











  Temp Pulse Resp BP BP Pulse Ox


 


 08/21/18 07:17       99


 


 08/21/18 07:16   87  20    99


 


 08/21/18 04:00  98.5 F  90  20   178/67 H  100


 


 08/21/18 02:42       98


 


 08/21/18 00:10   89  16    100


 


 08/21/18 00:00  98.2 F  89  18   163/50 H  100


 


 08/20/18 20:45   89   167/52 H  


 


 08/20/18 20:42   89   167/52 H  


 


 08/20/18 20:00  99.2 F  86  20    100








 Weight











Admit Weight                   209 lb 7.026 oz


 


Weight                         182 lb 8.684 oz











 Most Recent Monitor Data











Heart Rate from ECG            85


 


NIBP                           166/60


 


NIBP BP-Mean                   104


 


Respiration from ECG           16


 


SpO2                           100














I&O: 


 











 08/20/18 08/21/18 08/22/18





 06:59 06:59 06:59


 


Intake Total 1038 1028 


 


Output Total 30  


 


Balance 1008 1028 











Result Diagrams: 


 08/21/18 03:37





 08/21/18 03:37


Additional Labs: 


 Accuchecks











  08/21/18 08/21/18 08/20/18





  06:03 00:31 18:18


 


POC Glucose  276 H  285 H  284 H














  08/20/18





  12:25


 


POC Glucose  284 H














Phys Exam





- Physical Examination


Neck: no JVD


Respiratory: clear to auscultation bilateral


Cardiovascular: RRR, no significant murmur


Gastrointestinal: soft, positive bowel sounds


Musculoskeletal: edema present





Dx/Plan


(1) Acute respiratory failure


Code(s): J96.00 - ACUTE RESPIRATORY FAILURE, UNSP W HYPOXIA OR HYPERCAPNIA   

Status: Acute   


Qualifiers: 


   Respiratory failure complication: hypoxia   Qualified Code(s): J96.01 - 

Acute respiratory failure with hypoxia   


Comment: Trach in place.  Maturing well.   





(2) Candidal UTI (urinary tract infection)


Code(s): B37.49 - OTHER UROGENITAL CANDIDIASIS   Status: Acute   Comment: On 

Diflucan   





(3) CKD (chronic kidney disease) stage 5, GFR less than 15 ml/min


Code(s): N18.5 - CHRONIC KIDNEY DISEASE, STAGE 5   Status: Chronic   Comment: 

on HD at present   





(4) Hemiplegia affecting left nondominant side


Code(s): G81.94 - HEMIPLEGIA, UNSPECIFIED AFFECTING LEFT NONDOMINANT SIDE   

Status: Chronic   


Qualifiers: 


   Hemiplegia type: flaccid 


Comment: Awaiting placement.     





(5) Hyperlipidemia


Code(s): E78.5 - HYPERLIPIDEMIA, UNSPECIFIED   Status: Chronic   


Qualifiers: 


   Hyperlipidemia type: unspecified   Qualified Code(s): E78.5 - Hyperlipidemia

, unspecified   





(6) Hypertension


Code(s): I10 - ESSENTIAL (PRIMARY) HYPERTENSION   Status: Chronic   


Qualifiers: 


   Hypertension type: essential hypertension   Qualified Code(s): I10 - 

Essential (primary) hypertension   





- Plan


resp failure- vent dependent


-: on 3x weekly HD


-: nutrition , meds per PEG


-: LTAC referral





* .

## 2018-08-21 NOTE — PRG
DATE OF SERVICE:  08/21/2018

 

SUBJECTIVE:  The patient will nod and shake her head to questions, but no answer is really reliable t
hat I can tell.

 

PHYSICAL EXAMINATION:

VITAL SIGNS:  Temperature 98.5, pulse 87, respirations 20, O2 sat 99%, blood pressure 178/67.

HEENT:  Unremarkable.  Trach in good position, midline, very faint secretions.

CARDIAC:  S1 and S2 regular.

LUNGS:  Clear without wheezing or rhonchi.

ABDOMEN:  Soft, nontender.

EXTREMITIES:  Trace edema throughout.

 

LABORATORY DATA:  White blood cell count 8.9, hematocrit 22.0, platelet count 105.  Sodium 131, potas
sium 4.1, chloride 94, CO2 26, BUN 71, creatinine 5.4, glucose 264.

 

ASSESSMENT:

1.  Status post acute respiratory failure, which required tracheostomy placement.

2.  Acute on chronic renal failure requiring hemodialysis.

3.  Improved thrombocytopenia.

4.  Fungal urinary tract infection.

 

PLAN:  We are still awaiting placement.  LTAC referrals have been made.  This is mainly a rehabilitat
makenzie and placement issue.  I have not seen the family in quite some time, but I will be happy to updat
e them when they come around.

## 2018-08-21 NOTE — PRG
DATE OF SERVICE:  08/21/2018

 

SUBJECTIVE:  Ms. Purdy is a 73-year-old black female being followed by [facility] for her m
aintenance hemodialysis.  She is currently now on a regular maintenance dialysis.  She was initially 
admitted for a CVA with left hemiplegia with multiple complications including displacement of her PEG
 tube.  Overall, doing well.  She is currently undergoing dialysis today.  I am at the bedside superv
ising her dialysis.  Attempting between 2 and 3 liter fluid removal as tolerated.

 

OBJECTIVE:

VITAL SIGNS:  Blood pressure 184/63, heart rate 89, respiratory rate 33, pulse ox 100%, temperature 9
8.7.

GENERAL:  Awake, alert, comfortable, can follow simple commands.  She has a positive tracheostomy on 
tracheal mask.

LUNGS:  Decreased breath sounds.

HEART:  Normal sinus rhythm.  No murmur, no gallops, no rubs.

ABDOMEN:  Globular, soft, nontender, no masses.  Positive for PEG tube.

EXTREMITIES:  No edema, no deformities.

NEUROLOGIC:  Awake and follows simple commands, left hemiplegia.

 

MEDICATIONS:  08/21/2018 - Reviewed.

 

LABORATORY DATA:  08/21/2018 - White count 8.9, hemoglobin 7.8, hematocrit 22.  Sodium 131, potassium
 4.9, chloride 94, carbon dioxide 26, BUN 71, creatinine 5.43, glucose 264, calcium 9.1.

 

ASSESSMENT AND PLAN:  

1.  End-stage renal disease/chronic renal failure, currently undergoing hemodialysis.  Continue Tuesd
ay, Thursday, and Saturday dialysis regimen.  Fluid removal as tolerated.  No changes will be made wi
th the current dialysis regimen.

2.  Anemia - currently on weekly Epogen.  My bias is to increase now the Epogen to 10,000 units subcu
taneously every week.

3.  Status post cerebrovascular accident with left hemiplegia.  Continue supportive care.  We are cur
rently awaiting LTAC placement.

## 2018-08-22 RX ADMIN — ALBUTEROL SULFATE SCH MG: 2.5 SOLUTION RESPIRATORY (INHALATION) at 06:18

## 2018-08-22 RX ADMIN — Medication SCH ML: at 09:00

## 2018-08-22 RX ADMIN — ALBUTEROL SULFATE SCH MG: 2.5 SOLUTION RESPIRATORY (INHALATION) at 18:57

## 2018-08-22 RX ADMIN — Medication SCH ML: at 21:38

## 2018-08-22 RX ADMIN — INSULIN LISPRO PRN UNIT: 100 INJECTION, SOLUTION INTRAVENOUS; SUBCUTANEOUS at 13:07

## 2018-08-22 RX ADMIN — INSULIN LISPRO PRN UNIT: 100 INJECTION, SOLUTION INTRAVENOUS; SUBCUTANEOUS at 17:29

## 2018-08-22 RX ADMIN — ALBUTEROL SULFATE SCH MG: 2.5 SOLUTION RESPIRATORY (INHALATION) at 12:04

## 2018-08-22 RX ADMIN — INSULIN GLARGINE SCH MLS: 100 INJECTION, SOLUTION SUBCUTANEOUS at 21:55

## 2018-08-22 RX ADMIN — INSULIN LISPRO PRN UNIT: 100 INJECTION, SOLUTION INTRAVENOUS; SUBCUTANEOUS at 06:21

## 2018-08-22 RX ADMIN — FLUCONAZOLE SCH MLS: 2 INJECTION, SOLUTION INTRAVENOUS at 13:11

## 2018-08-22 RX ADMIN — INSULIN LISPRO PRN UNIT: 100 INJECTION, SOLUTION INTRAVENOUS; SUBCUTANEOUS at 01:39

## 2018-08-22 RX ADMIN — ALBUTEROL SULFATE SCH MG: 2.5 SOLUTION RESPIRATORY (INHALATION) at 00:04

## 2018-08-22 NOTE — PRG
DATE OF SERVICE:  08/22/2018

 

She appears to be about the same.  She is not communicative this morning.

 

PHYSICAL EXAMINATION:

VITAL SIGNS:  Temperature 98.8, pulse 91, respirations 19, O2 sat 100%, blood pressure 168/49.

HEENT:  Unremarkable.

NECK:  Trach in good position.  No excess secretions.

LUNGS:  Clear.

CARDIAC:  S1 and S2 regular.

ABDOMEN:  Soft.

EXTREMITIES:  Generalized edema throughout.

 

LABORATORY DATA:  No labs were obtained today.

 

ASSESSMENT:

1.  Status post tracheostomy for persistent respiratory failure.

2.  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  

3.  Acute on chronic renal failure requiring hemodialysis.

4.  Improved thrombocytopenia.

5.  Fungal urinary tract infection.

 

PLAN:  Awaiting long-term acute care hospital placement.  The patient's fluconazole can be discontinu
ed after today's dose.

## 2018-08-22 NOTE — PDOC.PN
- Subjective


Encounter Start Date: 08/22/18


Encounter Start Time: 10:30


-: non-verbal


Subjective: no overnite events





- Objective


Resuscitation Status: 


 











Resuscitation Status           DNR:Do Not Resuscitate














Vital Signs & Weight: 


 Vital Signs (12 hours)











  Temp Pulse Resp BP BP Pulse Ox


 


 08/22/18 08:59   86   166/50 H  


 


 08/22/18 07:27  98.8 F  91  19   168/49 H  100


 


 08/22/18 06:18   93  18    100


 


 08/22/18 04:09  98.8 F  82  20   159/47 H  100


 


 08/22/18 00:04   88  18    100


 


 08/22/18 00:00  99.0 F  93  22 H   164/51 H  100








 Weight











Admit Weight                   209 lb 7.026 oz


 


Weight                         182 lb 8.684 oz











 Most Recent Monitor Data











Heart Rate from ECG            85


 


NIBP                           166/60


 


NIBP BP-Mean                   104


 


Respiration from ECG           16


 


SpO2                           100














I&O: 


 











 08/21/18 08/22/18 08/23/18





 06:59 06:59 06:59


 


Intake Total 1028 760 


 


Balance 1028 760 











Result Diagrams: 


 08/21/18 03:37





 08/21/18 03:37


Additional Labs: 


 Accuchecks











  08/22/18 08/22/18 08/21/18





  05:54 00:22 18:57


 


POC Glucose  256 H  286 H  243 H














  08/21/18





  15:29


 


POC Glucose  269 H














Phys Exam





- Physical Examination


Neck: no JVD


Respiratory: clear to auscultation bilateral


Cardiovascular: RRR, no significant murmur


Gastrointestinal: soft, positive bowel sounds


PEG


Musculoskeletal: no edema





Dx/Plan


(1) Acute respiratory failure


Code(s): J96.00 - ACUTE RESPIRATORY FAILURE, UNSP W HYPOXIA OR HYPERCAPNIA   

Status: Acute   


Qualifiers: 


   Respiratory failure complication: hypoxia   Qualified Code(s): J96.01 - 

Acute respiratory failure with hypoxia   


Comment: Trach in place.  Maturing well.   





(2) Candidal UTI (urinary tract infection)


Code(s): B37.49 - OTHER UROGENITAL CANDIDIASIS   Status: Acute   Comment: On 

Diflucan   





(3) CKD (chronic kidney disease) stage 5, GFR less than 15 ml/min


Code(s): N18.5 - CHRONIC KIDNEY DISEASE, STAGE 5   Status: Chronic   Comment: 

on HD at present   





(4) Hemiplegia affecting left nondominant side


Code(s): G81.94 - HEMIPLEGIA, UNSPECIFIED AFFECTING LEFT NONDOMINANT SIDE   

Status: Chronic   


Qualifiers: 


   Hemiplegia type: flaccid 


Comment: Awaiting placement.     





(5) Hyperlipidemia


Code(s): E78.5 - HYPERLIPIDEMIA, UNSPECIFIED   Status: Chronic   


Qualifiers: 


   Hyperlipidemia type: unspecified   Qualified Code(s): E78.5 - Hyperlipidemia

, unspecified   





(6) Hypertension


Code(s): I10 - ESSENTIAL (PRIMARY) HYPERTENSION   Status: Chronic   


Qualifiers: 


   Hypertension type: essential hypertension   Qualified Code(s): I10 - 

Essential (primary) hypertension   





- Plan


vent dependent


-: PEG nutrition and meds


-: cont accu/ss/ LA insulin





* .

## 2018-08-23 RX ADMIN — ALBUTEROL SULFATE SCH MG: 2.5 SOLUTION RESPIRATORY (INHALATION) at 18:25

## 2018-08-23 RX ADMIN — INSULIN GLARGINE SCH MLS: 100 INJECTION, SOLUTION SUBCUTANEOUS at 21:25

## 2018-08-23 RX ADMIN — Medication SCH ML: at 21:19

## 2018-08-23 RX ADMIN — ALBUTEROL SULFATE SCH MG: 2.5 SOLUTION RESPIRATORY (INHALATION) at 01:00

## 2018-08-23 RX ADMIN — INSULIN LISPRO PRN UNIT: 100 INJECTION, SOLUTION INTRAVENOUS; SUBCUTANEOUS at 16:48

## 2018-08-23 RX ADMIN — Medication SCH ML: at 08:57

## 2018-08-23 RX ADMIN — INSULIN LISPRO PRN UNIT: 100 INJECTION, SOLUTION INTRAVENOUS; SUBCUTANEOUS at 01:18

## 2018-08-23 RX ADMIN — ALBUTEROL SULFATE SCH MG: 2.5 SOLUTION RESPIRATORY (INHALATION) at 12:20

## 2018-08-23 RX ADMIN — INSULIN LISPRO PRN UNIT: 100 INJECTION, SOLUTION INTRAVENOUS; SUBCUTANEOUS at 06:14

## 2018-08-23 RX ADMIN — ALBUTEROL SULFATE SCH MG: 2.5 SOLUTION RESPIRATORY (INHALATION) at 06:50

## 2018-08-23 NOTE — PRG
DATE OF SERVICE:  08/23/2018

 

Ms. Purdy is doing well.  She is tolerating her tube feedings.  She is undergoing hemodialysis.  S
he has acute superimposed on chronic renal failure and probably will need long term dialysis.  Ultras
ound vein mapping both arms revealed veins to be of poor quality.  If we did initiate long-term dialy
sis access, she most likely will require prosthetic graft.  The patient's wound healing is poor.  The
 wound from the old gastrostomy tube has not granulated well, but overall looks better.  There is les
s drainage from it.  

 

Today long term acute care is in progress of being arranged.  Today, I talked to the patient's family
 per telephone.  Her daughter is a dialysis nurse.  I was asked to establish long-term dialysis acces
s, but I have recommended to the family that that not be done.  Due to her overall multiple medical p
roblems are poor wound healing, I would not want to make any new incisions and not place a prosthetic
 graft which is probably what she would need for long-term access.  At this time, I would wait until 
she has better wound healing and see how she does clinically.  Long-term prognosis is poor.  I would 
recommend that she be on hemodialysis via hemodialysis catheter and if unexpectantly she does better 
then I can provide dialysis access in the future when she returned to this area.  At this point, I wi
ll see her as needed.  Please call if necessary.  The patient is stable to go to a long-term acute ca
re.

## 2018-08-23 NOTE — PDOC.PN
- Subjective


Encounter Start Date: 08/23/18


Encounter Start Time: 08:14


-: non-verbal


Subjective: no overnite events





- Objective


Resuscitation Status: 


 











Resuscitation Status           DNR:Do Not Resuscitate














MAR Reviewed: Yes


Vital Signs & Weight: 


 Vital Signs (12 hours)











  Temp Pulse Resp BP BP Pulse Ox


 


 08/23/18 07:38  98.5 F  89  26 H   168/48 H  100


 


 08/23/18 06:50   87  16    100


 


 08/23/18 04:00  98.4 F  89  24 H   154/50 H  100


 


 08/23/18 01:00   79  16    100


 


 08/23/18 00:00  99.2 F  83  20   158/47 H  100


 


 08/22/18 21:38   85   162/53 H  


 


 08/22/18 21:37   86   162/53 H  








 Weight











Admit Weight                   209 lb 7.026 oz


 


Weight                         182 lb 8.684 oz











 Most Recent Monitor Data











Heart Rate from ECG            85


 


NIBP                           166/60


 


NIBP BP-Mean                   104


 


Respiration from ECG           16


 


SpO2                           100














I&O: 


 











 08/22/18 08/23/18 08/24/18





 06:59 06:59 06:59


 


Intake Total 760 760 


 


Balance 760 760 











Result Diagrams: 


 08/21/18 03:37





 08/21/18 03:37


Additional Labs: 


 Accuchecks











  08/23/18 08/23/18 08/22/18





  06:00 00:12 16:38


 


POC Glucose  266 H  284 H  242 H














  08/22/18





  10:46


 


POC Glucose  296 H














Phys Exam





- Physical Examination


Constitutional: NAD


Neck: no JVD


Respiratory: clear to auscultation bilateral


Cardiovascular: RRR, no significant murmur


Gastrointestinal: soft, non-tender, positive bowel sounds


Musculoskeletal: no edema





Dx/Plan


(1) Acute respiratory failure


Code(s): J96.00 - ACUTE RESPIRATORY FAILURE, UNSP W HYPOXIA OR HYPERCAPNIA   

Status: Acute   


Qualifiers: 


   Respiratory failure complication: hypoxia   Qualified Code(s): J96.01 - 

Acute respiratory failure with hypoxia   


Comment: Trach in place.  Maturing well.   





(2) Candidal UTI (urinary tract infection)


Code(s): B37.49 - OTHER UROGENITAL CANDIDIASIS   Status: Acute   Comment: On 

Diflucan   





(3) CKD (chronic kidney disease) stage 5, GFR less than 15 ml/min


Code(s): N18.5 - CHRONIC KIDNEY DISEASE, STAGE 5   Status: Chronic   Comment: 

on HD at present   





(4) Hemiplegia affecting left nondominant side


Code(s): G81.94 - HEMIPLEGIA, UNSPECIFIED AFFECTING LEFT NONDOMINANT SIDE   

Status: Chronic   


Qualifiers: 


   Hemiplegia type: flaccid 


Comment: Awaiting placement.     





(5) Hyperlipidemia


Code(s): E78.5 - HYPERLIPIDEMIA, UNSPECIFIED   Status: Chronic   


Qualifiers: 


   Hyperlipidemia type: unspecified   Qualified Code(s): E78.5 - Hyperlipidemia

, unspecified   





(6) Hypertension


Code(s): I10 - ESSENTIAL (PRIMARY) HYPERTENSION   Status: Chronic   


Qualifiers: 


   Hypertension type: essential hypertension   Qualified Code(s): I10 - 

Essential (primary) hypertension   





- Plan


vent dependent, has trach


-: HD dependent


-: PEG for nutrition, meds





* .

## 2018-08-23 NOTE — PRG
DATE OF SERVICE:  08/23/2018

 

SUBJECTIVE:  The patient remains in the IMCU.  She is awaiting placement in LTAC.

 

PHYSICAL EXAMINATION:

VITAL SIGNS:  Temperature is 98.5 with no fever in the last 48 hours, pulse 89, respirations 26, O2 s
at 100%, blood pressure 146/48.

HEENT:  Unremarkable.

NECK:  No JVD.  Trach in good position, minimal secretions.

LUNGS:  Clear.

CARDIOVASCULAR:  S1, S2 regular.

ABDOMEN:  Soft.

EXTREMITIES:  No edema.

 

LABORATORY DATA:  Accu-Chek 266.

 

ASSESSMENT:

1.  Generalized failure to thrive.

2.  Status post tracheostomy for persistent respiratory failure.

3.  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

4.  Acute on chronic renal failure requiring hemodialysis.

5.  Fungal urinary tract infection.

 

PLAN:  We will go ahead and discontinue the Diflucan as she has had 7 days of treatment.  Otherwise, 
continue supportive care with tube feeds and hemodialysis.  The tracheostomy rate remains in place in
 order for suctioning to be performed.  This patient's prognosis continues to be quite poor.

## 2018-08-23 NOTE — PRG
DATE OF SERVICE:  08/23/2018

 

SUBJECTIVE:  Ms. Purdy is a 73-year-old black female with status post cerebrovascular accident wit
h left hemiplegia and being followed up for her chronic renal failure.  She has now been placed on 
ron hemodialysis.  We are doing 3 times a week dialysis.  I am currently dialyzing at the bedside. 
 Attempting 2.5 liter fluid removal as tolerated.  No acute events noted.

 

OBJECTIVE:

VITAL SIGNS:  Blood pressure 164/53, heart rate 83, respiratory rate 26, temperature 98.5, pulse ox 1
00%.

GENERAL:  She is sleepy but arousable.  She has a trach collar.

HEENT:  Slightly pale conjunctivae, anicteric sclerae.

NECK:  No neck mass, no carotid bruits, no JVD.

CHEST:  No deformities.

LUNGS:  Clear breath sounds.  No wheezing.

HEART:  Normal sinus rhythm.  No murmur, no gallops, no rubs.

ABDOMEN:  Globular, soft.  Positive for PEG tube.

EXTREMITIES:  No edema.

NEUROLOGIC:  Decreased mentation, left hemiplegia.

 

MEDICATIONS:  Medications of 08/23/2018 was reviewed.

 

LABORATORY DATA:  Laboratories of 08/21/2018; white count 8.9, hemoglobin 7.8.

 

On 08/21/2018; sodium 131, potassium 4.1, chloride 94, carbon dioxide 26, BUN 71, creatinine 5.43, ca
lcium 9.1.

 

ASSESSMENT AND PLAN:

1.  Chronic renal failure/acute kidney injury - most likely the patient is now ESRD.  Continuing 3 ti
mes a week hemodialysis.  Again, fluid removal only as tolerated.

2.  Status post cerebrovascular accident.  Supportive care.  Awaiting LTAC placement.

3.  Anemia, on weekly Epogen of 10,000 units.

## 2018-08-24 RX ADMIN — ALBUTEROL SULFATE SCH MG: 2.5 SOLUTION RESPIRATORY (INHALATION) at 15:13

## 2018-08-24 RX ADMIN — INSULIN LISPRO PRN UNIT: 100 INJECTION, SOLUTION INTRAVENOUS; SUBCUTANEOUS at 06:31

## 2018-08-24 RX ADMIN — ALBUTEROL SULFATE SCH: 2.5 SOLUTION RESPIRATORY (INHALATION) at 08:14

## 2018-08-24 RX ADMIN — ALBUTEROL SULFATE SCH MG: 2.5 SOLUTION RESPIRATORY (INHALATION) at 18:26

## 2018-08-24 RX ADMIN — ALBUTEROL SULFATE SCH MG: 2.5 SOLUTION RESPIRATORY (INHALATION) at 00:07

## 2018-08-24 RX ADMIN — INSULIN LISPRO PRN UNIT: 100 INJECTION, SOLUTION INTRAVENOUS; SUBCUTANEOUS at 12:24

## 2018-08-24 RX ADMIN — INSULIN LISPRO PRN UNIT: 100 INJECTION, SOLUTION INTRAVENOUS; SUBCUTANEOUS at 18:33

## 2018-08-24 RX ADMIN — Medication SCH ML: at 21:13

## 2018-08-24 RX ADMIN — INSULIN LISPRO PRN UNIT: 100 INJECTION, SOLUTION INTRAVENOUS; SUBCUTANEOUS at 01:07

## 2018-08-24 RX ADMIN — INSULIN GLARGINE SCH MLS: 100 INJECTION, SOLUTION SUBCUTANEOUS at 21:13

## 2018-08-24 RX ADMIN — Medication SCH ML: at 08:06

## 2018-08-24 NOTE — PRG
DATE OF SERVICE:  08/24/2018

 

The patient is about the same.  There have been no acute changes.

 

PHYSICAL EXAMINATION:

VITAL SIGNS:  Temperature is 98.8, pulse 89, respirations 18, O2 sat 100%, blood pressure 182/53.

HEENT:  Unremarkable.

NECK:  Trach in good position.

LUNGS:  Clear but distant breath sounds.

CARDIAC:  S1 and S2 regular.

ABDOMEN:  Soft.

EXTREMITIES:  Generalized mild edema throughout.

 

ASSESSMENT:  

1.  Status post trach and PEG.  

2.  Status post massive stroke with severe debilitation.

 

PLAN:   We are basically hoping for transfer to LTAC.  I think her prognosis is very poor.  She is co
ntinuing on dialysis.  I think the family may need to rethink end of life measures as her chances for
 recovery seemed almost zero.

## 2018-08-25 LAB
ANION GAP SERPL CALC-SCNC: 17 MMOL/L (ref 10–20)
BASOPHILS # BLD AUTO: 0 THOU/UL (ref 0–0.2)
BASOPHILS NFR BLD AUTO: 0.3 % (ref 0–1)
BUN SERPL-MCNC: 56 MG/DL (ref 9.8–20.1)
CALCIUM SERPL-MCNC: 9.2 MG/DL (ref 7.8–10.44)
CHLORIDE SERPL-SCNC: 94 MMOL/L (ref 98–107)
CO2 SERPL-SCNC: 26 MMOL/L (ref 23–31)
CREAT CL PREDICTED SERPL C-G-VRATE: 15 ML/MIN (ref 70–130)
EOSINOPHIL # BLD AUTO: 0.1 THOU/UL (ref 0–0.7)
EOSINOPHIL NFR BLD AUTO: 1 % (ref 0–10)
GLUCOSE SERPL-MCNC: 274 MG/DL (ref 83–110)
HGB BLD-MCNC: 8.2 G/DL (ref 12–16)
LYMPHOCYTES # BLD: 2 THOU/UL (ref 1.2–3.4)
LYMPHOCYTES NFR BLD AUTO: 17.1 % (ref 21–51)
MCH RBC QN AUTO: 31.1 PG (ref 27–31)
MCV RBC AUTO: 89.3 FL (ref 78–98)
MONOCYTES # BLD AUTO: 0.9 THOU/UL (ref 0.11–0.59)
MONOCYTES NFR BLD AUTO: 7.3 % (ref 0–10)
NEUTROPHILS # BLD AUTO: 8.7 THOU/UL (ref 1.4–6.5)
NEUTROPHILS NFR BLD AUTO: 74.3 % (ref 42–75)
PLATELET # BLD AUTO: 190 THOU/UL (ref 130–400)
POTASSIUM SERPL-SCNC: 3.7 MMOL/L (ref 3.5–5.1)
RBC # BLD AUTO: 2.62 MILL/UL (ref 4.2–5.4)
SODIUM SERPL-SCNC: 133 MMOL/L (ref 136–145)
WBC # BLD AUTO: 11.7 THOU/UL (ref 4.8–10.8)

## 2018-08-25 RX ADMIN — ALBUTEROL SULFATE SCH MG: 2.5 SOLUTION RESPIRATORY (INHALATION) at 14:58

## 2018-08-25 RX ADMIN — CLONIDINE SCH MG: 0.3 PATCH TRANSDERMAL at 20:59

## 2018-08-25 RX ADMIN — ALBUTEROL SULFATE SCH MG: 2.5 SOLUTION RESPIRATORY (INHALATION) at 18:26

## 2018-08-25 RX ADMIN — INSULIN LISPRO PRN UNIT: 100 INJECTION, SOLUTION INTRAVENOUS; SUBCUTANEOUS at 00:41

## 2018-08-25 RX ADMIN — ALBUTEROL SULFATE SCH MG: 2.5 SOLUTION RESPIRATORY (INHALATION) at 00:03

## 2018-08-25 RX ADMIN — INSULIN LISPRO PRN UNIT: 100 INJECTION, SOLUTION INTRAVENOUS; SUBCUTANEOUS at 11:49

## 2018-08-25 RX ADMIN — INSULIN LISPRO PRN UNIT: 100 INJECTION, SOLUTION INTRAVENOUS; SUBCUTANEOUS at 06:24

## 2018-08-25 RX ADMIN — Medication SCH ML: at 09:31

## 2018-08-25 RX ADMIN — Medication SCH ML: at 21:02

## 2018-08-25 RX ADMIN — ALBUTEROL SULFATE SCH MG: 2.5 SOLUTION RESPIRATORY (INHALATION) at 07:39

## 2018-08-25 NOTE — PDOC.PN
- Subjective


Encounter Start Date: 08/25/18


Encounter Start Time: 11:30





Patient is alert, Discussed with brother at Bedside. Plan to transfer to LTAC 

when ready. She remains very sick.





- Objective


Resuscitation Status: 


 











Resuscitation Status           DNR:Do Not Resuscitate














MAR Reviewed: Yes


Vital Signs & Weight: 


 Vital Signs (12 hours)











  Temp Pulse Pulse Pulse Resp BP BP


 


 08/25/18 11:04  97.8 F  85    19  


 


 08/25/18 09:30   89     161/57 H 


 


 08/25/18 09:29   89     161/57 H 


 


 08/25/18 09:00  98.3 F  89    18  


 


 08/25/18 08:48    89  86    161/57 H


 


 08/25/18 07:41       


 


 08/25/18 07:39   89    20  


 


 08/25/18 07:31  98.3 F  83    18  


 


 08/25/18 04:49  97.8 F  81    21 H  














  BP BP Pulse Ox Pulse Ox Pulse Ox


 


 08/25/18 11:04   164/53 H  100  


 


 08/25/18 09:30     


 


 08/25/18 09:29     


 


 08/25/18 09:00    99  


 


 08/25/18 08:48  161/55 H    100  100


 


 08/25/18 07:41    98  


 


 08/25/18 07:39    98  


 


 08/25/18 07:31   168/57 H  100  


 


 08/25/18 04:49   156/47 H  100  








 Weight











Admit Weight                   209 lb 7.026 oz


 


Weight                         182 lb 8.684 oz











 Most Recent Monitor Data











Heart Rate from ECG            85


 


NIBP                           166/60


 


NIBP BP-Mean                   104


 


Respiration from ECG           16


 


SpO2                           100














I&O: 


 











 08/24/18 08/25/18 08/26/18





 06:59 06:59 06:59


 


Intake Total 730 1060 30


 


Balance 730 1060 30











Result Diagrams: 


 08/25/18 07:04





 08/25/18 07:04


Additional Labs: 


 Accuchecks











  08/25/18 08/25/18 08/25/18





  10:28 06:00 00:25


 


POC Glucose  330 H  265 H  327 H














  08/24/18





  16:43


 


POC Glucose  308 H











Radiology Reviewed by me: Yes





Phys Exam





- Physical Examination


HEENT: PERRLA, moist MMs


Neck: no nodes, no JVD


Respiratory: no wheezing, no rales


Cardiovascular: RRR, no significant murmur


Gastrointestinal: soft, non-tender


Musculoskeletal: pulses present, edema present


Neurological: non-focal





Dx/Plan


(1) Acute respiratory failure


Code(s): J96.00 - ACUTE RESPIRATORY FAILURE, UNSP W HYPOXIA OR HYPERCAPNIA   

Status: Acute   


Qualifiers: 


   Respiratory failure complication: hypoxia   Qualified Code(s): J96.01 - 

Acute respiratory failure with hypoxia   


Comment: Trach in place.  Maturing well.   





(2) Candidal UTI (urinary tract infection)


Code(s): B37.49 - OTHER UROGENITAL CANDIDIASIS   Status: Acute   Comment: On 

Diflucan   





(3) Fever


Code(s): R50.9 - FEVER, UNSPECIFIED   Status: Acute   Comment: CXR was negative 

yesterday.  Will get blood cultures if it occurs again.  Check UA and DC Bennett.

   





(4) Thrombocytopenia


Code(s): D69.6 - THROMBOCYTOPENIA, UNSPECIFIED   Status: Acute   Comment: down 

to 24K.  no signs of bleeding   





(5) CKD (chronic kidney disease) stage 5, GFR less than 15 ml/min


Code(s): N18.5 - CHRONIC KIDNEY DISEASE, STAGE 5   Status: Chronic   Comment: 

on HD at present   





(6) Hypokalemia


Code(s): E87.6 - HYPOKALEMIA   Status: Resolved   





(7) PEG tube malfunction


Code(s): K94.23 - GASTROSTOMY MALFUNCTION   Status: Resolved   





(8) NORA (acute kidney injury)


Code(s): N17.9 - ACUTE KIDNEY FAILURE, UNSPECIFIED   Status: Acute   Comment: 

stable   





(9) Hyperglycemia due to type 2 diabetes mellitus


Code(s): E11.65 - TYPE 2 DIABETES MELLITUS WITH HYPERGLYCEMIA   Status: Acute   

Comment: Incmrease lantus to 30 untis sc daily.   





- Plan


plan discussed w/ family, continue antibiotics, , respiratory 

therapy, DVT proph w/lovenox





* .








Review of Systems





- Review of Systems


Other: 





Unable to obtain as pt is trached.





- Medications/Allergies


Allergies/Adverse Reactions: 


 Allergies











Allergy/AdvReac Type Severity Reaction Status Date / Time


 


lisinopril [From Prinivil] Allergy   Verified 07/26/18 01:52











Medications: 


 Current Medications





Acetaminophen (Tylenol)  650 mg PO Q4H PRN


   PRN Reason: Headache/Fever or Pain


   Last Admin: 08/21/18 20:40 Dose:  650 mg


Albuterol Sulfate (Ventolin)  2.5 mg NEB [id number] [facility]


   Last Admin: 08/25/18 07:39 Dose:  2.5 mg


Amlodipine Besylate (Norvasc)  5 mg PO BID [facility]


   Last Admin: 08/25/18 09:29 Dose:  5 mg


Lipase/Protease/Amylase ([place] [id number])  1 cap FS .PER PROTOCOL PRN


   PRN Reason: TUBE OCCLUSION PROTOCOL


   Last Admin: 08/22/18 11:17 Dose:  1 cap


Carvedilol (Coreg)  25 mg PO BID-WM [facility]


   Last Admin: 08/25/18 09:28 Dose:  25 mg


Clonidine (Catapres)  0.1 mg PER TUBE Q4H PRN


   PRN Reason: Systolic BP > 170


   Last Admin: 08/20/18 06:09 Dose:  0.1 mg


Clonidine (Catapres-Tts-3)  0.3 mg TD Q7D [facility]


   Last Admin: 08/18/18 20:30 Dose:  0.3 mg


Dextrose/Water (Dextrose 50%)  25 gm SLOW IVP PRN PRN


   PRN Reason: Hypoglycemia


Epoetin Conner (Procrit)  10,000 units SC Q7D [facility]


   Last Admin: 08/21/18 12:19 Dose:  10,000 units


Glucagon (Glucagon)  1 mg IM PRN PRN


   PRN Reason: Hypoglycemia


Hydralazine HCl (Apresoline)  25 mg PO TID [facility]


   Last Admin: 08/25/18 09:30 Dose:  25 mg


Hydrocortisone/Aloe (Hydrocortisone 1% Cream)  0 gm TOP PRN PRN


   PRN Reason: TO AFFECTED AREA


Dextrose/Water (D5w)  1,000 mls @ 0 mls/hr IV .Q0M PRN


   PRN Reason: Hypoglycemia


Insulin Glargine 30 units/ (Miscellaneous Medication)  0.3 mls @ 0 mls/hr SC [facility]


Insulin Human Lispro (Humalog)  0 units SC .MODERATE SLIDING SC PRN


   PRN Reason: Moderate Correctional Scale


   Last Admin: 08/25/18 11:49 Dose:  8 unit


Insulin Human Lispro (Humalog)  0 units SC .BEDTIME SLIDING SC PRN


   PRN Reason: Bedtime Correctional Scale


   Last Admin: 08/20/18 00:43 Dose:  3 unit


Labetalol HCl (Normodyne)  20 mg SLOW IVP Q4H PRN


   PRN Reason: Systolic BP > 170


   Last Admin: 08/20/18 08:45 Dose:  20 mg


Metoclopramide HCl (Reglan)  10 mg IVP Q6H [facility]


   Last Admin: 08/25/18 09:27 Dose:  Not Given


Ondansetron HCl (Zofran)  4 mg IVP Q6H PRN


   PRN Reason: Nausea/Vomiting


   Last Admin: 08/10/18 16:08 Dose:  4 mg


Pantoprazole Sodium (Protonix)  40 mg IVP DAILY [facility]


   Last Admin: 08/25/18 09:31 Dose:  40 mg


Sodium Bicarbonate (Bicarbonate, Sodium)  650 mg PER TUBE .PER PROTOCOL PRN


   PRN Reason: ENTERAL TUBE OCCLUSION


Sodium Chloride (Flush - Normal Saline)  10 ml IVF Q12HR [facility]


   Last Admin: 08/25/18 09:31 Dose:  10 ml


Sodium Chloride (Flush - Normal Saline)  10 ml IVF PRN PRN


   PRN Reason: Saline Flush


   Last Admin: 08/08/18 09:47 Dose:  10 ml

## 2018-08-25 NOTE — PDOC.PN
- Subjective


Encounter Start Date: 08/24/18


Encounter Start Time: 15:30





Patient seen today, lethargic, No concenr noted. 





- Objective


Resuscitation Status: 


 











Resuscitation Status           DNR:Do Not Resuscitate














MAR Reviewed: Yes


Vital Signs & Weight: 


 Vital Signs (12 hours)











  Temp Pulse Pulse Pulse Resp BP BP


 


 08/25/18 11:04  97.8 F  85    19  


 


 08/25/18 09:30   89     161/57 H 


 


 08/25/18 09:29   89     161/57 H 


 


 08/25/18 09:00  98.3 F  89    18  


 


 08/25/18 08:48    89  86    161/57 H


 


 08/25/18 07:41       


 


 08/25/18 07:39   89    20  


 


 08/25/18 07:31  98.3 F  83    18  


 


 08/25/18 04:49  97.8 F  81    21 H  














  BP BP Pulse Ox Pulse Ox Pulse Ox


 


 08/25/18 11:04   164/53 H  100  


 


 08/25/18 09:30     


 


 08/25/18 09:29     


 


 08/25/18 09:00    99  


 


 08/25/18 08:48  161/55 H    100  100


 


 08/25/18 07:41    98  


 


 08/25/18 07:39    98  


 


 08/25/18 07:31   168/57 H  100  


 


 08/25/18 04:49   156/47 H  100  








 Weight











Admit Weight                   209 lb 7.026 oz


 


Weight                         182 lb 8.684 oz











 Most Recent Monitor Data











Heart Rate from ECG            85


 


NIBP                           166/60


 


NIBP BP-Mean                   104


 


Respiration from ECG           16


 


SpO2                           100














I&O: 


 











 08/24/18 08/25/18 08/26/18





 06:59 06:59 06:59


 


Intake Total 730 1060 30


 


Balance 730 1060 30











Result Diagrams: 


 08/25/18 07:04





 08/25/18 07:04


Additional Labs: 


 Accuchecks











  08/25/18 08/25/18 08/25/18





  10:28 06:00 00:25


 


POC Glucose  330 H  265 H  327 H














  08/24/18





  16:43


 


POC Glucose  308 H











Radiology Reviewed by me: Yes





Dx/Plan


(1) Acute respiratory failure


Code(s): J96.00 - ACUTE RESPIRATORY FAILURE, UNSP W HYPOXIA OR HYPERCAPNIA   

Status: Acute   


Qualifiers: 


   Respiratory failure complication: hypoxia   Qualified Code(s): J96.01 - 

Acute respiratory failure with hypoxia   


Comment: Trach in place.  Maturing well.   





(2) Candidal UTI (urinary tract infection)


Code(s): B37.49 - OTHER UROGENITAL CANDIDIASIS   Status: Acute   Comment: On 

Diflucan   





(3) Fever


Code(s): R50.9 - FEVER, UNSPECIFIED   Status: Acute   Comment: CXR was negative 

yesterday.  Will get blood cultures if it occurs again.  Check UA and DC Bennett.

   





(4) Thrombocytopenia


Code(s): D69.6 - THROMBOCYTOPENIA, UNSPECIFIED   Status: Acute   Comment: down 

to 24K.  no signs of bleeding   





(5) CKD (chronic kidney disease) stage 5, GFR less than 15 ml/min


Code(s): N18.5 - CHRONIC KIDNEY DISEASE, STAGE 5   Status: Chronic   Comment: 

on HD at present   





(6) Hypokalemia


Code(s): E87.6 - HYPOKALEMIA   Status: Resolved   





(7) PEG tube malfunction


Code(s): K94.23 - GASTROSTOMY MALFUNCTION   Status: Resolved   





(8) NORA (acute kidney injury)


Code(s): N17.9 - ACUTE KIDNEY FAILURE, UNSPECIFIED   Status: Acute   Comment: 

stable   





(9) Hyperglycemia due to type 2 diabetes mellitus


Code(s): E11.65 - TYPE 2 DIABETES MELLITUS WITH HYPERGLYCEMIA   Status: Acute   

Comment: pt on Lantus 20 untis daily, will monitor closley.   





- Plan


cont current plan of care, continue antibiotics, , respiratory 

therapy, DVT proph w/lovenox


Plan to Consult CAse mnager for LTAC





* .








Review of Systems





- Review of Systems


Other: 





Unable to obtain ROS





- Medications/Allergies


Allergies/Adverse Reactions: 


 Allergies











Allergy/AdvReac Type Severity Reaction Status Date / Time


 


lisinopril [From Prinivil] Allergy   Verified 07/26/18 01:52











Medications: 


 Current Medications





Acetaminophen (Tylenol)  650 mg PO Q4H PRN


   PRN Reason: Headache/Fever or Pain


   Last Admin: 08/21/18 20:40 Dose:  650 mg


Albuterol Sulfate (Ventolin)  2.5 mg NEB [id number] [facility]


   Last Admin: 08/25/18 07:39 Dose:  2.5 mg


Amlodipine Besylate (Norvasc)  5 mg PO BID [facility]


   Last Admin: 08/25/18 09:29 Dose:  5 mg


Lipase/Protease/Amylase ([place] [id number])  1 cap FS .PER PROTOCOL PRN


   PRN Reason: TUBE OCCLUSION PROTOCOL


   Last Admin: 08/22/18 11:17 Dose:  1 cap


Carvedilol (Coreg)  25 mg PO BID-WM [facility]


   Last Admin: 08/25/18 09:28 Dose:  25 mg


Clonidine (Catapres)  0.1 mg PER TUBE Q4H PRN


   PRN Reason: Systolic BP > 170


   Last Admin: 08/20/18 06:09 Dose:  0.1 mg


Clonidine (Catapres-Tts-3)  0.3 mg TD Q7D [facility]


   Last Admin: 08/18/18 20:30 Dose:  0.3 mg


Dextrose/Water (Dextrose 50%)  25 gm SLOW IVP PRN PRN


   PRN Reason: Hypoglycemia


Epoetin Conner (Procrit)  10,000 units SC Q7D [facility]


   Last Admin: 08/21/18 12:19 Dose:  10,000 units


Glucagon (Glucagon)  1 mg IM PRN PRN


   PRN Reason: Hypoglycemia


Hydralazine HCl (Apresoline)  25 mg PO TID [facility]


   Last Admin: 08/25/18 09:30 Dose:  25 mg


Hydrocortisone/Aloe (Hydrocortisone 1% Cream)  0 gm TOP PRN PRN


   PRN Reason: TO AFFECTED AREA


Dextrose/Water (D5w)  1,000 mls @ 0 mls/hr IV .Q0M PRN


   PRN Reason: Hypoglycemia


Insulin Glargine 30 units/ (Miscellaneous Medication)  0.3 mls @ 0 mls/hr SC [facility]


Insulin Human Lispro (Humalog)  0 units SC .MODERATE SLIDING SC PRN


   PRN Reason: Moderate Correctional Scale


   Last Admin: 08/25/18 11:49 Dose:  8 unit


Insulin Human Lispro (Humalog)  0 units SC .BEDTIME SLIDING SC PRN


   PRN Reason: Bedtime Correctional Scale


   Last Admin: 08/20/18 00:43 Dose:  3 unit


Labetalol HCl (Normodyne)  20 mg SLOW IVP Q4H PRN


   PRN Reason: Systolic BP > 170


   Last Admin: 08/20/18 08:45 Dose:  20 mg


Metoclopramide HCl (Reglan)  10 mg IVP Q6H [facility]


   Last Admin: 08/25/18 09:27 Dose:  Not Given


Ondansetron HCl (Zofran)  4 mg IVP Q6H PRN


   PRN Reason: Nausea/Vomiting


   Last Admin: 08/10/18 16:08 Dose:  4 mg


Pantoprazole Sodium (Protonix)  40 mg IVP DAILY [facility]


   Last Admin: 08/25/18 09:31 Dose:  40 mg


Sodium Bicarbonate (Bicarbonate, Sodium)  650 mg PER TUBE .PER PROTOCOL PRN


   PRN Reason: ENTERAL TUBE OCCLUSION


Sodium Chloride (Flush - Normal Saline)  10 ml IVF Q12HR [facility]


   Last Admin: 08/25/18 09:31 Dose:  10 ml


Sodium Chloride (Flush - Normal Saline)  10 ml IVF PRN PRN


   PRN Reason: Saline Flush


   Last Admin: 08/08/18 09:47 Dose:  10 ml

## 2018-08-25 NOTE — PRG
DATE OF SERVICE:  08/25/2018

 

SUBJECTIVE:  Ms. Purdy is a 73-year-old black female who was admitted initially for CVA with left 
hemiplegia, and we are now following her up for acute kidney injury on top of her chronic renal failu
re.  She has been initiated on dialysis due to the volume overload and progressive azotemia.  This mo
rning, she is noted to be sleepy, but arousable and not in distress.  No acute events noted.

 

OBJECTIVE:

VITAL SIGNS:  Blood pressure 161/57, heart rate 89, respiratory rate 20, temperature 98.3, pulse ox 9
8%.

GENERAL:  The patient is sleepy, but arousable and comfortable, can follow simple commands.  Positive
 for tracheostomy.

LUNGS:  Clear breath sounds.  No wheezing, no crackles.

HEART:  Normal sinus rhythm.  No murmur, no gallops or rubs.

ABDOMEN:  Globular, soft, nontender, no masses.

EXTREMITIES:  No edema, no deformities.  Positive for PEG tube.

 

MEDICATIONS:  Medications of 08/25/2018 was reviewed.

 

LABORATORY DATA:  Laboratories of 08/25/2018, white count 11.7, hemoglobin 8.2.  Sodium 133, potassiu
m 3.7, chloride 94, carbon dioxide 26, BUN 56, creatinine 4.32, glucose 274, calcium 9.2.

 

ASSESSMENT AND PLAN:

1.  Chronic renal failure/acute kidney injury - I have scheduled this patient for her maintenance hem
odialysis.  Again, fluid removal only as tolerated by the patient.

2.  Anemia - continuing current weekly Epogen.  Please note that the anemia is slightly improving wit
h the most recent hemoglobin at 8.2.  P.r.n. blood transfusion.

3.  Status post cerebrovascular accident - left hemiplegia and dysphagia - currently, continue suppor
tive care, currently on PEG tube feeding.  Awaiting long-term acute care placement.

## 2018-08-26 RX ADMIN — INSULIN LISPRO PRN UNIT: 100 INJECTION, SOLUTION INTRAVENOUS; SUBCUTANEOUS at 01:35

## 2018-08-26 RX ADMIN — ALBUTEROL SULFATE SCH MG: 2.5 SOLUTION RESPIRATORY (INHALATION) at 07:28

## 2018-08-26 RX ADMIN — INSULIN LISPRO PRN UNIT: 100 INJECTION, SOLUTION INTRAVENOUS; SUBCUTANEOUS at 12:23

## 2018-08-26 RX ADMIN — Medication SCH ML: at 20:04

## 2018-08-26 RX ADMIN — ALBUTEROL SULFATE SCH MG: 2.5 SOLUTION RESPIRATORY (INHALATION) at 01:11

## 2018-08-26 RX ADMIN — Medication SCH ML: at 13:42

## 2018-08-26 RX ADMIN — ALBUTEROL SULFATE SCH MG: 2.5 SOLUTION RESPIRATORY (INHALATION) at 18:35

## 2018-08-26 RX ADMIN — ALBUTEROL SULFATE SCH MG: 2.5 SOLUTION RESPIRATORY (INHALATION) at 14:10

## 2018-08-26 RX ADMIN — INSULIN LISPRO PRN UNIT: 100 INJECTION, SOLUTION INTRAVENOUS; SUBCUTANEOUS at 17:59

## 2018-08-26 RX ADMIN — INSULIN LISPRO PRN UNIT: 100 INJECTION, SOLUTION INTRAVENOUS; SUBCUTANEOUS at 06:11

## 2018-08-26 NOTE — RAD
PORTABLE CHEST:

 

HISTORY: 

Assess for source of infection.  PEG tube placement.

 

COMPARISON: 

8/18/18.

 

Large-caliber central line overlies the SVC.  Tracheostomy device is again noted.  Lungs show no evid
ence of infiltrate.  

 

IMPRESSION: 

No definite infiltrate seen on portable chest.

 

POS: [facility]

## 2018-08-26 NOTE — PDOC.PN
- Subjective


Encounter Start Date: 08/26/18


Encounter Start Time: 11:00


PAtient is seen today drowsy and lethargic, had her HD. pt ius noted to have 

low grade fever last night.





- Objective


Resuscitation Status: 


 











Resuscitation Status           DNR:Do Not Resuscitate














MAR Reviewed: Yes


Vital Signs & Weight: 


 Vital Signs (12 hours)











  Temp Pulse Resp BP BP Pulse Ox


 


 08/26/18 11:00  99.6 F  81  18   147/48 H  100


 


 08/26/18 08:39   82   143/52 H  


 


 08/26/18 08:38   82   143/52 H  


 


 08/26/18 08:00  100.2 F H  88  22 H    100


 


 08/26/18 07:44       100


 


 08/26/18 07:28   89  20    100


 


 08/26/18 07:00  100.2 F H  88  22 H   166/51 H  100


 


 08/26/18 03:56  98.2 F  88  24 H   159/49 H  100








 Weight











Admit Weight                   209 lb 7.026 oz


 


Weight                         182 lb 8.684 oz











 Most Recent Monitor Data











Heart Rate from ECG            85


 


NIBP                           166/60


 


NIBP BP-Mean                   104


 


Respiration from ECG           16


 


SpO2                           100














I&O: 


 











 08/25/18 08/26/18 08/27/18





 06:59 06:59 06:59


 


Intake Total 1060 2040 60


 


Output Total  1900 0


 


Balance 1060 140 60











Result Diagrams: 


 08/25/18 07:04





 08/25/18 07:04


Additional Labs: 


 Accuchecks











  08/26/18 08/26/18 08/26/18





  12:03 05:29 01:32


 


POC Glucose  281 H  257 H  335 H














  08/25/18 08/25/18





  20:09 16:56


 


POC Glucose  286 H  185 H











Radiology Reviewed by me: Yes


EKG Reviewed by me: Yes





Phys Exam





- Physical Examination


Constitutional: NAD


HEENT: PERRLA, moist MMs


Neck: no nodes, no JVD


Respiratory: no wheezing, no rales


Cardiovascular: RRR, no significant murmur


Gastrointestinal: soft, non-tender


Musculoskeletal: no edema, pulses present


Neurological: non-focal, normal sensation


Lymphatic: no nodes





Dx/Plan


(1) Acute respiratory failure


Code(s): J96.00 - ACUTE RESPIRATORY FAILURE, UNSP W HYPOXIA OR HYPERCAPNIA   

Status: Acute   


Qualifiers: 


   Respiratory failure complication: hypoxia   Qualified Code(s): J96.01 - 

Acute respiratory failure with hypoxia   


Comment: Trach in place.  Maturing well.   





(2) Candidal UTI (urinary tract infection)


Code(s): B37.49 - OTHER UROGENITAL CANDIDIASIS   Status: Acute   Comment: On 

Diflucan   





(3) Fever


Code(s): R50.9 - FEVER, UNSPECIFIED   Status: Acute   Comment: will repeat 

chest xray today, and KUB.  will get Blood Cultures.    





(4) Thrombocytopenia


Code(s): D69.6 - THROMBOCYTOPENIA, UNSPECIFIED   Status: Acute   Comment: down 

to 24K.  no signs of bleeding   





(5) CKD (chronic kidney disease) stage 5, GFR less than 15 ml/min


Code(s): N18.5 - CHRONIC KIDNEY DISEASE, STAGE 5   Status: Chronic   Comment: 

on HD at present   





(6) Hypokalemia


Code(s): E87.6 - HYPOKALEMIA   Status: Resolved   





(7) PEG tube malfunction


Code(s): K94.23 - GASTROSTOMY MALFUNCTION   Status: Resolved   





(8) NORA (acute kidney injury)


Code(s): N17.9 - ACUTE KIDNEY FAILURE, UNSPECIFIED   Status: Acute   Comment: 

stable   





(9) Hyperglycemia due to type 2 diabetes mellitus


Code(s): E11.65 - TYPE 2 DIABETES MELLITUS WITH HYPERGLYCEMIA   Status: Acute   

Comment: increase  Lantus to 45 untis daily, will monitor closley.   





- Plan


cont current plan of care, continue antibiotics, PT/OT, , 

respiratory therapy, incentive spirometry, DVT proph w/lovenox





* .

## 2018-08-26 NOTE — RAD
SUPINE ABDOMEN:

 

HISTORY: 

PEG tube placement.  Assess for source of infection.

 

FINDINGS: 

PEG tube overlies the abdomen just to the right of midline.  Bowel gas pattern is unremarkable with s
cattered stool and gas in the colon.  A large rounded amorphous calcification overlying the mid pelvi
s measures up to 6 cm and would be consistent with a large calcified fibroid.  There are other benign
-appearing calcifications overlying the pelvis.

 

IMPRESSION: 

Unremarkable bowel gas pattern.

 

POS: [facility]

## 2018-08-27 VITALS — SYSTOLIC BLOOD PRESSURE: 149 MMHG | TEMPERATURE: 97.3 F | DIASTOLIC BLOOD PRESSURE: 51 MMHG

## 2018-08-27 RX ADMIN — ALBUTEROL SULFATE SCH MG: 2.5 SOLUTION RESPIRATORY (INHALATION) at 00:03

## 2018-08-27 RX ADMIN — INSULIN LISPRO PRN UNIT: 100 INJECTION, SOLUTION INTRAVENOUS; SUBCUTANEOUS at 00:58

## 2018-08-27 RX ADMIN — INSULIN LISPRO PRN UNIT: 100 INJECTION, SOLUTION INTRAVENOUS; SUBCUTANEOUS at 06:18

## 2018-08-27 RX ADMIN — ALBUTEROL SULFATE SCH MG: 2.5 SOLUTION RESPIRATORY (INHALATION) at 07:26

## 2018-08-27 RX ADMIN — Medication SCH ML: at 09:04

## 2018-08-27 RX ADMIN — INSULIN LISPRO PRN UNIT: 100 INJECTION, SOLUTION INTRAVENOUS; SUBCUTANEOUS at 10:57

## 2018-08-27 NOTE — PDOC.PN
- Subjective


Encounter Start Date: 08/27/18


Encounter Start Time: 19:38 (late entry)


Subjective: no new complaints, denies any pain/discomfort





- Objective


Resuscitation Status: 


 











Resuscitation Status           DNR:Do Not Resuscitate














MAR Reviewed: Yes


Vital Signs & Weight: 


 Vital Signs (12 hours)











  Temp Pulse Resp BP BP Pulse Ox


 


 08/27/18 10:58  97.3 F L  81  18   149/51 H  100


 


 08/27/18 09:05  98.3 F  83  17    100


 


 08/27/18 09:04   83    


 


 08/27/18 09:03   83   163/55 H  








 Weight











Admit Weight                   209 lb 7.026 oz


 


Weight                         182 lb 8.684 oz











 Most Recent Monitor Data











Heart Rate from ECG            85


 


NIBP                           166/60


 


NIBP BP-Mean                   104


 


Respiration from ECG           16


 


SpO2                           100














I&O: 


 











 08/26/18 08/27/18 08/28/18





 06:59 06:59 06:59


 


Intake Total 2040 775 60


 


Output Total 1900 0 


 


Balance 140 775 60











Result Diagrams: 


 08/25/18 07:04





 08/25/18 07:04


Additional Labs: 


 Accuchecks











  08/27/18 08/27/18 08/27/18





  10:45 06:18 00:08


 


POC Glucose  266 H  264 H  286 H














Phys Exam





- Physical Examination


Constitutional: NAD


HEENT: PERRLA, moist MMs, sclera anicteric, oral pharynx no lesions


Neck: no nodes, no JVD, supple, full ROM


Respiratory: no wheezing, no rales, no rhonchi, clear to auscultation bilateral


Cardiovascular: RRR, no significant murmur, no rub


Gastrointestinal: soft, non-tender, no distention, positive bowel sounds


Musculoskeletal: no edema, pulses present


Psychiatric: normal affect





Dx/Plan


(1) NORA (acute kidney injury)


Code(s): N17.9 - ACUTE KIDNEY FAILURE, UNSPECIFIED   Status: Acute   Comment: 

stable   





(2) Acute CVA (cerebrovascular accident)


Code(s): I63.9 - CEREBRAL INFARCTION, UNSPECIFIED   Status: Acute   Comment: 

Dense left hemiplegia with large right MCA cva   





(3) Acute respiratory failure


Code(s): J96.00 - ACUTE RESPIRATORY FAILURE, UNSP W HYPOXIA OR HYPERCAPNIA   

Status: Acute   


Qualifiers: 


   Respiratory failure complication: hypoxia   Qualified Code(s): J96.01 - 

Acute respiratory failure with hypoxia   


Comment: Trach in place.  Maturing well.   





(4) Candidal UTI (urinary tract infection)


Code(s): B37.49 - OTHER UROGENITAL CANDIDIASIS   Status: Acute   Comment: On 

Diflucan   





(5) Dysphagia


Code(s): R13.10 - DYSPHAGIA, UNSPECIFIED   Status: Acute   


Qualifiers: 


   Dysphagia type: unspecified   Qualified Code(s): R13.10 - Dysphagia, 

unspecified   


Comment: s/p peg   





(6) Fever


Code(s): R50.9 - FEVER, UNSPECIFIED   Status: Acute   Comment: will repeat 

chest xray today, and KUB.  will get Blood Cultures.    





(7) Hyperglycemia due to type 2 diabetes mellitus


Code(s): E11.65 - TYPE 2 DIABETES MELLITUS WITH HYPERGLYCEMIA   Status: Acute   

Comment: increase  Lantus to 45 untis daily, will monitor closley.   





(8) CKD (chronic kidney disease) stage 4, GFR 15-29 ml/min


Code(s): N18.4 - CHRONIC KIDNEY DISEASE, STAGE 4 (SEVERE)   Status: Chronic   





(9) FTT (failure to thrive) in adult


Status: Chronic   





(10) Hemiplegia affecting left nondominant side


Code(s): G81.94 - HEMIPLEGIA, UNSPECIFIED AFFECTING LEFT NONDOMINANT SIDE   

Status: Chronic   


Qualifiers: 


   Hemiplegia type: flaccid 


Comment: Awaiting placement.     





(11) Hyperlipidemia


Code(s): E78.5 - HYPERLIPIDEMIA, UNSPECIFIED   Status: Chronic   


Qualifiers: 


   Hyperlipidemia type: unspecified   Qualified Code(s): E78.5 - Hyperlipidemia

, unspecified   





(12) Hypertension


Code(s): I10 - ESSENTIAL (PRIMARY) HYPERTENSION   Status: Chronic   


Qualifiers: 


   Hypertension type: essential hypertension   Qualified Code(s): I10 - 

Essential (primary) hypertension   





- Plan


approved at [facility] & cleared by [facility].nephrology


-: cont HD as an OP


-: PEG,Trach care.


-: meds reconciled


-: will DC to [facility] 





* .

## 2018-08-27 NOTE — PRG
DATE OF SERVICE:  08/27/2018

 

SERVICE:  Renal Medicine.

 

SUBJECTIVE:  Ms. Purdy is a 73-year-old black female, who was admitted for cerebrovascular acciden
t with left hemiplegia.  In the interim, she went to acute respiratory failure, and due to the inabil
ity to be weaned off from the ventilator, a tracheostomy has been placed.  We are following her up al
so for her maintenance hemodialysis.  She developed acute renal failure on top of her chronic renal f
ailure without any evidence of renal recovery.  No new complaints today.  I was notified that she has
 already a placement for LTAC.

 

No acute events noted last night.

 

PHYSICAL EXAMINATION:

VITAL SIGNS:  Blood pressure 160/55, heart rate 83, respirations 20, O2 sat 100%.

GENERAL EXAM:  Patient is sleeping, but arousable.  She can follow simple commands.

SKIN:  Adequate turgor.

HEENT:  She has slightly pale conjunctivae, anicteric sclerae.

NECK:  No neck mass, no carotid bruits, no JVD.  Positive for tracheostomy.

LUNGS:  Clear breath sounds.  No wheezing, no crackles.

HEART:  Normal sinus rhythm.  No murmur, no gallops, no rubs.

ABDOMEN:  Globular, soft.  Positive for a PEG.

EXTREMITIES:  No edema, no deformities.

 

Medications of 08/27/2018 were reviewed.

 

LABORATORY DATA:  Laboratories of 08/25/2018, white count 11.7, hemoglobin 8.2.  08/27/2018, glucose 
286.  08/25/2018, BUN 56, creatinine 4.32, potassium 3.7, glucose 274, calcium 9.2.

 

ASSESSMENT AND PLAN:

1.  Acute kidney injury/chronic renal failure, currently on maintenance hemodialysis.  No evidence of
 renal recovery.  Continue supportive care.  Continue 3 times a week hemodialysis.

2.  Anemia, stable and stabilizing.  Continue weekly Epogen of 10,000 units subcutaneously every week
.

3.  Status post cerebrovascular accident with left hemiplegia.  Continue supportive care.  Currently 
on PEG tube feeding as well on a tracheostomy.

 

Overall, agree with current management.

## 2018-08-27 NOTE — PDOC.PULPN
Progress Note: Subj/Obj





- Subjective


Date: 08/27/18


Time: 08:03


Narrative: about the same





- ROS


ROS unobtainable: due to mental status





- Objective


Allergies/Adverse Reactions: 


 Allergies











Allergy/AdvReac Type Severity Reaction Status Date / Time


 


lisinopril [From Prinivil] Allergy   Verified 07/26/18 01:52











MAR Reviewed: Yes


Vital Signs: 


 Vital Signs











Temp  98.3 F   08/27/18 07:25


 


Pulse  83   08/27/18 07:26


 


Resp  20   08/27/18 07:26


 


BP  153/54 H  08/27/18 07:25


 


Pulse Ox  100   08/27/18 07:26








 





Progress Note: Exam





- Physical Exam


Constitutional: NAD


HEENT: PERRLA, sclera anicteric


Deviation from normal: trach in good position.  minimal secretions


Cardiovascular: RRR


Respiratory: clear to auscultation bilaterally


Gastrointestinal: soft, non-tender


Musculoskeletal: edema present


Skin: no rash





Progress Note: Data





- Labs


Result Diagrams: 


 08/25/18 07:04





 08/25/18 07:04





Progress Note: A/P





- Problems


(1) Acute respiratory failure


Current Visit: Yes   Status: Acute   Code(s): J96.00 - ACUTE RESPIRATORY FAILURE

, UNSP W HYPOXIA OR HYPERCAPNIA   


Qualifiers: 


   Respiratory failure complication: hypoxia   Qualified Code(s): J96.01 - 

Acute respiratory failure with hypoxia   





(2) Acute CVA (cerebrovascular accident)


Current Visit: No   Status: Acute   Code(s): I63.9 - CEREBRAL INFARCTION, 

UNSPECIFIED   





(3) FTT (failure to thrive) in adult


Current Visit: No   Status: Chronic   





- Plan


Plan: 





Awaiting placement.  Family needs to make decision regarding long term dialysis 

(to me, the prospect of long term dialysis is somewhat unreasonable because of 

patients poor performance status and unlikelihood of improvement)

## 2023-10-10 NOTE — PDOC.PN
- Subjective


Encounter Start Date: 08/18/18


Encounter Start Time: 08:15





Non-verbal.  Denies any pain.  





- Objective


Resuscitation Status: 


 











Resuscitation Status           DNR:Do Not Resuscitate














Vital Signs & Weight: 


 Vital Signs (12 hours)











  Temp Pulse Resp BP BP Pulse Ox


 


 08/18/18 15:08  98.8 F  95  18   176/59 H  100


 


 08/18/18 12:38   87  16   


 


 08/18/18 10:58  97.8 F  87  20   138/44 L  99


 


 08/18/18 08:33   88   178/57 H  


 


 08/18/18 08:00  99.0 F  88  14    100


 


 08/18/18 07:33   88  14   


 


 08/18/18 07:16  99.0 F  88  18   185/64 H  100


 


 08/18/18 05:19     189/57 H  








 Weight











Admit Weight                   209 lb 7.026 oz


 


Weight                         182 lb 8.684 oz











 Most Recent Monitor Data











Heart Rate from ECG            85


 


NIBP                           166/60


 


NIBP BP-Mean                   104


 


Respiration from ECG           16


 


SpO2                           100














I&O: 


 











 08/17/18 08/18/18 08/19/18





 06:59 06:59 06:59


 


Intake Total 1375 820 


 


Output Total 3834 80 


 


Balance -2459 740 











Result Diagrams: 


 08/17/18 05:00





 08/17/18 05:00


Additional Labs: 


 Accuchecks











  08/18/18 08/18/18 08/17/18





  10:51 05:28 23:59


 


POC Glucose  171 H  149 H  198 H














  08/17/18





  16:31


 


POC Glucose  192 H














Phys Exam





- Physical Examination


Constitutional: NAD


Trach


Respiratory: no wheezing


Has some cough and upper airway rales.  


Cardiovascular: RRR, no significant murmur


Gastrointestinal: soft, non-tender, no distention


PEG


Musculoskeletal: no edema


Psychiatric: normal affect





Dx/Plan


(1) Acute respiratory failure


Code(s): J96.00 - ACUTE RESPIRATORY FAILURE, UNSP W HYPOXIA OR HYPERCAPNIA   

Status: Acute   


Qualifiers: 


   Respiratory failure complication: hypoxia   Qualified Code(s): J96.01 - 

Acute respiratory failure with hypoxia   


Comment: Trach in place.  Maturing well.   





(2) Dysphagia


Code(s): R13.10 - DYSPHAGIA, UNSPECIFIED   Status: Acute   


Qualifiers: 


   Dysphagia type: unspecified   Qualified Code(s): R13.10 - Dysphagia, 

unspecified   


Comment: s/p peg   





(3) Hemiplegia affecting left nondominant side


Code(s): G81.94 - HEMIPLEGIA, UNSPECIFIED AFFECTING LEFT NONDOMINANT SIDE   

Status: Chronic   


Qualifiers: 


   Hemiplegia type: flaccid 


Comment: Awaiting placement.     





(4) Candidal UTI (urinary tract infection)


Code(s): B37.49 - OTHER UROGENITAL CANDIDIASIS   Status: Acute   Comment: On 

Diflucan   





- Plan





* Awaiting placement. [place]